# Patient Record
Sex: FEMALE | Race: WHITE | NOT HISPANIC OR LATINO | Employment: PART TIME | URBAN - METROPOLITAN AREA
[De-identification: names, ages, dates, MRNs, and addresses within clinical notes are randomized per-mention and may not be internally consistent; named-entity substitution may affect disease eponyms.]

---

## 2017-05-02 ENCOUNTER — GENERIC CONVERSION - ENCOUNTER (OUTPATIENT)
Dept: OTHER | Facility: OTHER | Age: 52
End: 2017-05-02

## 2017-05-16 ENCOUNTER — ALLSCRIPTS OFFICE VISIT (OUTPATIENT)
Dept: OTHER | Facility: OTHER | Age: 52
End: 2017-05-16

## 2017-05-16 PROCEDURE — 88305 TISSUE EXAM BY PATHOLOGIST: CPT | Performed by: OBSTETRICS & GYNECOLOGY

## 2017-05-17 ENCOUNTER — LAB REQUISITION (OUTPATIENT)
Dept: LAB | Facility: HOSPITAL | Age: 52
End: 2017-05-17
Payer: COMMERCIAL

## 2017-05-17 DIAGNOSIS — N95.0 POSTMENOPAUSAL BLEEDING: ICD-10-CM

## 2017-07-11 ENCOUNTER — GENERIC CONVERSION - ENCOUNTER (OUTPATIENT)
Dept: OTHER | Facility: OTHER | Age: 52
End: 2017-07-11

## 2017-07-27 ENCOUNTER — TRANSCRIBE ORDERS (OUTPATIENT)
Dept: ADMINISTRATIVE | Facility: HOSPITAL | Age: 52
End: 2017-07-27

## 2017-07-27 DIAGNOSIS — Z12.31 ENCOUNTER FOR SCREENING MAMMOGRAM FOR MALIGNANT NEOPLASM OF BREAST: Primary | ICD-10-CM

## 2017-07-27 DIAGNOSIS — E78.5 HYPERLIPIDEMIA: ICD-10-CM

## 2017-07-27 DIAGNOSIS — Z48.89 OTHER SPECIFIED AFTERCARE FOLLOWING SURGERY: ICD-10-CM

## 2017-07-27 DIAGNOSIS — Z78.0 ASYMPTOMATIC MENOPAUSAL STATE: ICD-10-CM

## 2017-07-27 DIAGNOSIS — Z78.0 POST-MENOPAUSAL: Primary | ICD-10-CM

## 2017-08-03 ENCOUNTER — APPOINTMENT (OUTPATIENT)
Dept: LAB | Facility: CLINIC | Age: 52
End: 2017-08-03
Payer: COMMERCIAL

## 2017-08-03 ENCOUNTER — TRANSCRIBE ORDERS (OUTPATIENT)
Dept: LAB | Facility: CLINIC | Age: 52
End: 2017-08-03

## 2017-08-03 DIAGNOSIS — Z00.8 HEALTH EXAMINATION IN POPULATION SURVEY: Primary | ICD-10-CM

## 2017-08-03 LAB
CHOLEST SERPL-MCNC: 303 MG/DL (ref 50–200)
EST. AVERAGE GLUCOSE BLD GHB EST-MCNC: 120 MG/DL
HBA1C MFR BLD: 5.8 % (ref 4.2–6.3)
HDLC SERPL-MCNC: 50 MG/DL (ref 40–60)
LDLC SERPL CALC-MCNC: 187 MG/DL (ref 0–100)
TRIGL SERPL-MCNC: 328 MG/DL

## 2017-08-03 PROCEDURE — 83036 HEMOGLOBIN GLYCOSYLATED A1C: CPT

## 2017-08-03 PROCEDURE — 36415 COLL VENOUS BLD VENIPUNCTURE: CPT

## 2017-08-03 PROCEDURE — 80061 LIPID PANEL: CPT

## 2017-08-07 ENCOUNTER — GENERIC CONVERSION - ENCOUNTER (OUTPATIENT)
Dept: OTHER | Facility: OTHER | Age: 52
End: 2017-08-07

## 2017-08-09 ENCOUNTER — GENERIC CONVERSION - ENCOUNTER (OUTPATIENT)
Dept: OTHER | Facility: OTHER | Age: 52
End: 2017-08-09

## 2017-08-14 ENCOUNTER — APPOINTMENT (OUTPATIENT)
Dept: LAB | Facility: CLINIC | Age: 52
End: 2017-08-14
Payer: COMMERCIAL

## 2017-08-14 DIAGNOSIS — E78.5 HYPERLIPIDEMIA: ICD-10-CM

## 2017-08-14 LAB
ALBUMIN SERPL BCP-MCNC: 4 G/DL (ref 3.5–5)
ALP SERPL-CCNC: 79 U/L (ref 46–116)
ALT SERPL W P-5'-P-CCNC: 21 U/L (ref 12–78)
ANION GAP SERPL CALCULATED.3IONS-SCNC: 7 MMOL/L (ref 4–13)
AST SERPL W P-5'-P-CCNC: 18 U/L (ref 5–45)
BILIRUB SERPL-MCNC: 0.41 MG/DL (ref 0.2–1)
BUN SERPL-MCNC: 19 MG/DL (ref 5–25)
CALCIUM SERPL-MCNC: 9.1 MG/DL (ref 8.3–10.1)
CHLORIDE SERPL-SCNC: 105 MMOL/L (ref 100–108)
CO2 SERPL-SCNC: 28 MMOL/L (ref 21–32)
CREAT SERPL-MCNC: 0.8 MG/DL (ref 0.6–1.3)
GFR SERPL CREATININE-BSD FRML MDRD: 86 ML/MIN/1.73SQ M
GLUCOSE P FAST SERPL-MCNC: 72 MG/DL (ref 65–99)
POTASSIUM SERPL-SCNC: 3.9 MMOL/L (ref 3.5–5.3)
PROT SERPL-MCNC: 7.5 G/DL (ref 6.4–8.2)
SODIUM SERPL-SCNC: 140 MMOL/L (ref 136–145)

## 2017-08-14 PROCEDURE — 36415 COLL VENOUS BLD VENIPUNCTURE: CPT

## 2017-08-14 PROCEDURE — 80053 COMPREHEN METABOLIC PANEL: CPT

## 2017-08-15 ENCOUNTER — GENERIC CONVERSION - ENCOUNTER (OUTPATIENT)
Dept: OTHER | Facility: OTHER | Age: 52
End: 2017-08-15

## 2017-08-22 ENCOUNTER — ALLSCRIPTS OFFICE VISIT (OUTPATIENT)
Dept: OTHER | Facility: OTHER | Age: 52
End: 2017-08-22

## 2017-08-22 ENCOUNTER — HOSPITAL ENCOUNTER (OUTPATIENT)
Dept: RADIOLOGY | Facility: HOSPITAL | Age: 52
Discharge: HOME | End: 2017-08-22
Attending: ORTHOPAEDIC SURGERY
Payer: COMMERCIAL

## 2017-08-22 DIAGNOSIS — Z48.89 OTHER SPECIFIED AFTERCARE FOLLOWING SURGERY: ICD-10-CM

## 2017-08-22 PROCEDURE — 73502 X-RAY EXAM HIP UNI 2-3 VIEWS: CPT

## 2017-09-08 ENCOUNTER — LAB REQUISITION (OUTPATIENT)
Dept: LAB | Facility: HOSPITAL | Age: 52
End: 2017-09-08
Payer: COMMERCIAL

## 2017-09-08 ENCOUNTER — ALLSCRIPTS OFFICE VISIT (OUTPATIENT)
Dept: OTHER | Facility: OTHER | Age: 52
End: 2017-09-08

## 2017-09-08 DIAGNOSIS — N84.0 POLYP OF CORPUS UTERI: ICD-10-CM

## 2017-09-08 DIAGNOSIS — N95.0 POSTMENOPAUSAL BLEEDING: ICD-10-CM

## 2017-09-08 PROCEDURE — 88305 TISSUE EXAM BY PATHOLOGIST: CPT | Performed by: OBSTETRICS & GYNECOLOGY

## 2017-09-20 ENCOUNTER — GENERIC CONVERSION - ENCOUNTER (OUTPATIENT)
Dept: OTHER | Facility: OTHER | Age: 52
End: 2017-09-20

## 2017-10-06 ENCOUNTER — HOSPITAL ENCOUNTER (OUTPATIENT)
Dept: RADIOLOGY | Age: 52
Discharge: HOME | End: 2017-10-06
Payer: COMMERCIAL

## 2017-10-06 DIAGNOSIS — Z78.0 POST-MENOPAUSAL: ICD-10-CM

## 2017-10-06 DIAGNOSIS — Z12.31 ENCOUNTER FOR SCREENING MAMMOGRAM FOR MALIGNANT NEOPLASM OF BREAST: ICD-10-CM

## 2017-10-06 PROCEDURE — G0202 SCR MAMMO BI INCL CAD: HCPCS

## 2017-10-06 PROCEDURE — 77080 DXA BONE DENSITY AXIAL: CPT

## 2017-10-06 PROCEDURE — 77063 BREAST TOMOSYNTHESIS BI: CPT

## 2017-10-09 ENCOUNTER — GENERIC CONVERSION - ENCOUNTER (OUTPATIENT)
Dept: OTHER | Facility: OTHER | Age: 52
End: 2017-10-09

## 2017-11-15 DIAGNOSIS — E78.5 HYPERLIPIDEMIA: ICD-10-CM

## 2017-12-11 ENCOUNTER — GENERIC CONVERSION - ENCOUNTER (OUTPATIENT)
Dept: OTHER | Facility: OTHER | Age: 52
End: 2017-12-11

## 2017-12-11 ENCOUNTER — TRANSCRIBE ORDERS (OUTPATIENT)
Dept: ADMINISTRATIVE | Facility: HOSPITAL | Age: 52
End: 2017-12-11

## 2017-12-11 DIAGNOSIS — I71.2 THORACIC AORTIC ANEURYSM WITHOUT RUPTURE (HCC): Primary | ICD-10-CM

## 2017-12-12 ENCOUNTER — HOSPITAL ENCOUNTER (OUTPATIENT)
Dept: RADIOLOGY | Facility: HOSPITAL | Age: 52
Discharge: HOME | End: 2017-12-12
Payer: COMMERCIAL

## 2017-12-12 ENCOUNTER — GENERIC CONVERSION - ENCOUNTER (OUTPATIENT)
Dept: OTHER | Facility: OTHER | Age: 52
End: 2017-12-12

## 2017-12-12 DIAGNOSIS — I71.2 THORACIC AORTIC ANEURYSM WITHOUT RUPTURE (HCC): ICD-10-CM

## 2017-12-12 PROCEDURE — 71260 CT THORAX DX C+: CPT

## 2017-12-12 RX ADMIN — IODIXANOL 85 ML: 320 INJECTION, SOLUTION INTRAVASCULAR at 19:28

## 2018-01-10 NOTE — RESULT NOTES
Verified Results  (1) COMPREHENSIVE METABOLIC PANEL [id number] 66:18CX Sherry BOWER Order Number: [id number]     Test Name Result Flag Reference   SODIUM 140 mmol/L  136-145   POTASSIUM 3 9 mmol/L  3 5-5 3   CHLORIDE 105 mmol/L  100-108   CARBON DIOXIDE 28 mmol/L  21-32   ANION GAP (CALC) 7 mmol/L  4-13   BLOOD UREA NITROGEN 19 mg/dL  5-25   CREATININE 0 80 mg/dL  0 60-1 30   Standardized to IDMS reference method   CALCIUM 9 1 mg/dL  8 3-10 1   BILI, TOTAL 0 41 mg/dL  0 20-1 00   ALK PHOSPHATAS 79 U/L     ALT (SGPT) 21 U/L  12-78   Specimen collection should occur prior to Sulfasalazine and/or Sulfapyridine administration due to the potential for falsely depressed results  AST(SGOT) 18 U/L  5-45   Specimen collection should occur prior to Sulfasalazine administration due to the potential for falsely depressed results  ALBUMIN 4 0 g/dL  3 5-5 0   TOTAL PROTEIN 7 5 g/dL  6 4-8 2   eGFR 86 ml/min/1 73sq m     National Kidney Disease Education Program recommendations are as follows:  GFR calculation is accurate only with a steady state creatinine  Chronic Kidney disease less than 60 ml/min/1 73 sq  meters  Kidney failure less than 15 ml/min/1 73 sq  meters  GLUCOSE FASTING 72 mg/dL  65-99   Specimen collection should occur prior to Sulfasalazine administration due to the potential for falsely depressed results  Specimen collection should occur prior to Sulfapyridine administration due to the potential for falsely elevated results

## 2018-01-12 VITALS
SYSTOLIC BLOOD PRESSURE: 124 MMHG | HEIGHT: 65 IN | BODY MASS INDEX: 24.99 KG/M2 | WEIGHT: 150 LBS | DIASTOLIC BLOOD PRESSURE: 82 MMHG

## 2018-01-12 NOTE — MISCELLANEOUS
Message  To whom it May concern:  Patient is at low risk for tuberculosis  Signatures   Electronically signed by :  LATISHA Saab ; Sep 20 2017  4:54PM EST                       (Author)

## 2018-01-13 VITALS
HEART RATE: 50 BPM | BODY MASS INDEX: 24.99 KG/M2 | WEIGHT: 150 LBS | HEIGHT: 65 IN | DIASTOLIC BLOOD PRESSURE: 62 MMHG | SYSTOLIC BLOOD PRESSURE: 118 MMHG

## 2018-01-14 NOTE — MISCELLANEOUS
Message  Pt called she had no period for 1 year then had a couple of hours of spotting then stopped Spoke to Dr Phyllis Keene he wants her to have work up appt scheduled for 5/16 TVS/SIS      Active Problems    1  Acute pharyngitis (462) (J02 9)   2  Aftercare following left hip joint replacement surgery (V54 81,V43 64) (Z47 1,Z96 642)   3  Allergic reaction to dye (995 27,E947 4) (T50 995A)   4  Anxiety (300 00) (F41 9)   5  Benign familial tremor (333 1) (G25 0)   6  Cervical radiculopathy (723 4) (M54 12)   7  Chronic hip pain, left (719 45,338 29) (M25 552,G89 29)   8  Chronic lower back pain (724 2,338 29) (M54 5,G89 29)   9  Chronic otitis media of right ear (382 9) (H66 91)   10  Cough (786 2) (R05)   11  Dense breasts (793 82) (R92 2)   12  Disturbance Of Smell (781 1)   13  DJD (degenerative joint disease), cervical (722 4) (M50 30)   14  Encounter for routine gynecological examination with Papanicolaou smear of cervix    (V72 31,V76 2) (Z01 419)   15  Encounter for screening mammogram for malignant neoplasm of breast (V76 12)    (Z12 31)   16  Endometriosis (617 9) (N80 9)   17  Episodic tension-type headache, not intractable (339 11) (G44 219)   18  Esophageal reflux (530 81) (K21 9)   19  Hemangioma of skin (228 01) (D18 01)   20  Herniated cervical disc (722 0) (M50 20)   21  Herpes simplex infection (054 9) (B00 9)   22  History of headache (V13 89) (Z87 898)   23  Hyperlipidemia (272 4) (E78 5)   24  Lateral epicondylitis of left elbow (726 32) (M77 12)   25  Long term use of drug (V58 69) (Z79 899)   26  Lumbar degenerative disc disease (722 52) (M51 36)   27  Menopause (627 2) (Z78 0)   28  Myofascial pain syndrome (729 1) (M79 1)   29  Need for prophylactic vaccination and inoculation against influenza (V04 81) (Z23)   30  BRENDA (obstructive sleep apnea) (327 23) (G47 33)   31  Pelvic and perineal pain (625 9) (R10 2)   32  Pelvic congestion syndrome (625 5) (N94 89)   33   PPD screening test (V74 1) (Z11 1)   34  Primary localized osteoarthrosis of left hip (715 15) (M16 12)   35  Primary localized osteoarthrosis of the hip, right (715 15) (M16 11)   36  Prophylactic antibiotic (V58 62) (Z79 2)   37  Sleep apnea (780 57) (G47 30)   38  Taste Disturbances (781 1)   39  Tear of right rotator cuff (840 4) (M75 101)    Current Meds   1  ALPRAZolam 0 25 MG Oral Tablet; TAKE 1 TABLET DAILY AS NEEDED; Therapy: 57EYD2603 to (Last Rx:75Xts9819) Ordered   2  Amoxicillin 500 MG Oral Capsule; TAKE 1 CAPSULE 3 TIMES DAILY UNTIL GONE;   Therapy: 85GDO0227 to (Last Rx:16Mar2017)  Requested for: 75XXT0355 Ordered   3  Caltrate 600+D TABS; Take 1 tablet daily; Therapy: (66 411 64 22) to Recorded   4  Clindamycin Phosphate 1 % External Solution; APPLY SPARINGLY TO AFFECTED   AREA(S) TWICE DAILY; Therapy: 56CRJ6663 to (Evaluate:69Bnk7387)  Requested for: 89Qgd8102; Last   Rx:35Qxs8454 Ordered   5  Coenzyme Q10 100 MG Oral Tablet; Therapy: 80FGJ2188 to Recorded   6  [employer] Vitamin E 400 UNIT Oral Capsule; Take as directed Recorded   7  Daily Value Multivitamin Oral Tablet; Take 1 tablet daily Recorded   8  Diclofenac Sodium 75 MG Oral Tablet Delayed Release; TAKE 1 TABLET Every twelve   hours; Therapy: 03VSQ4280 to (Evaluate:17Nov2014)  Requested for: 09OWV3979; Last   Rx:41Ubw2087 Ordered   9  Dicyclomine HCl - 20 MG Oral Tablet; TAKE 1 TABLET EVERY 6 HOURS AS NEEDED; Therapy: 87VWD8169 to (Evaluate:14Nov2015)  Requested for: 79OLH2787; Last   Rx:40Lij7418 Ordered   10  DULoxetine HCl - 30 MG Oral Capsule Delayed Release Particles (Cymbalta); TAKE 1    CAPSULE Daily; Therapy: 87Dgl1836 to (Evaluate:22Mar2018)  Requested for: 45YPF1600; Last    Rx:27Mar2017 Ordered   11  Gabapentin 100 MG Oral Capsule; take 1 capsule by mouth every 8 hours; Therapy: 26CAL0403 to (Evaluate:89Gnj3892)  Requested for: 61MZQ0874; Last    Rx:20Fvr0242 Ordered   12  Methocarbamol 750 MG Oral Tablet;  Take 1 tablet twice daily as needed; Therapy: 84QQU1954 to (Evaluate:39Cgy7599)  Requested for: 89FDY3083; Last    Rx:21May2014 Ordered   13  Omeprazole 20 MG Oral Capsule Delayed Release; Take 2 tablets QD  Requested for:    22QUA3290; Last Rx:31Jan2017 Ordered   14  Orphenadrine Citrate  MG Oral Tablet Extended Release 12 Hour; Take one    tablet daily at bedtime when necessary; Therapy: 55HZD5800 to (Evaluate:17Gzg7669)  Requested for: 30GED5380; Last    Rx:02Mar2017 Ordered   15  Red Yeast Rice 600 MG Oral Capsule; Therapy: 21LBY8840 to Recorded   16  TraMADol HCl - 50 MG Oral Tablet; TAKE 1 TABLET EVERY 6 HOURS AS NEEDED FOR    PAIN;    Therapy: 20RPC6321 to (Evaluate:05Mar2017); Last MI:53PXF7582 Ordered   17  ValACYclovir HCl - 1 GM Oral Tablet; TAKE 2 TABLETS TWICE DAILY FOR 1 DAY AT    FIRST SIGN OF ONSET; Therapy: 14JVF6691 to (Evaluate:21Apr2016)  Requested for: 12Apr2016; Last    Rx:12Apr2016 Ordered   18  Zinc TABS; Therapy: (Recorded:39Swx0663) to Recorded    Allergies    1  Gentamicin in Saline SOLN   2  Morphine Sulfate in Dextrose SOLN   3  Vicodin TABS    4   IVP Dye    Signatures   Electronically signed by : Angel Gage, ; May  2 2017  3:34PM EST                       (Author)

## 2018-01-15 NOTE — RESULT NOTES
Verified Results  * DXA BONE DENSITY SPINE HIP AND PELVIS 78HUH4095 10:22AM Jazzy Began     Test Name Result Flag Reference   DXA BONE DENSITY SPINE HIP AND PELVIS (Report)     CENTRAL DXA SCAN     CLINICAL HISTORY:  49year old post-menopausal  female with history of bilateral hip replacement  The patient walks and takes calcium and vitamin D supplements  TECHNIQUE: Bone densitometry was performed using a Hologic Horizon A bone densitometer  Regions of interest appear properly placed  There are no obvious fractures or other confounding variables which could limit the study  COMPARISON: February 7, 2014 and before     RESULTS:    LUMBAR SPINE: L1-L4:   BMD 0 974 gm/cm2   T-score -0 7   Z-score 0 1     LEFT FOREARM-ONE 3RD REGION:   BMD 0 781 gm/cm2   T-score 1 7   Z-score 2 5             IMPRESSION:   1  Based on the [facility] classification, this study is normal and the patient is considered at low risk for fracture  2  Since the prior study, there has been a significant decrease in BMD in the lumbar spine of 0 039 Gm/cm2 or 3 8%  This change exceeds our own least significant change and, therefore, is statistically significant within 95% confidence level  3  A daily intake of calcium of at least 1200 mg and vitamin D, 800-1000 IU, as well as weight bearing and muscle strengthening exercise, fall prevention and avoidance of tobacco and excessive alcohol intake  4  Repeat DXA in 18 - 24 months, on the same machine, as clinically indicated         WHO CLASSIFICATION:   Normal (a T-score of -1 0 or higher)   Low bone mineral density (a T-score of less than -1 0 but higher than -2 5)   Osteoporosis (a T-score of -2 5 or less)   Severe osteoporosis (a T-score of -2 5 or less with a fragility fracture)             Workstation performed: [id number]     Signed by:   Kusum Traylor MD   10/6/17

## 2018-01-15 NOTE — MISCELLANEOUS
Message  Pharmacy called brisdelle not covered but will give the generic Paxil [facility] CALLED      Active Problems    1  Acute pharyngitis (462) (J02 9)   2  Aftercare following left hip joint replacement surgery (V54 81,V43 64) (Z47 1,Z96 642)   3  Allergic reaction to dye (995 27,E947 4) (T50 995A)   4  Anxiety (300 00) (F41 9)   5  Benign familial tremor (333 1) (G25 0)   6  Cervical radiculopathy (723 4) (M54 12)   7  Chronic lower back pain (724 2,338 29) (M54 5,G89 29)   8  Chronic otitis media of right ear (382 9) (H66 91)   9  Dense breasts (793 82) (R92 2)   10  Disturbance Of Smell (781 1)   11  DJD (degenerative joint disease), cervical (722 4) (M50 30)   12  Encounter for routine gynecological examination with Papanicolaou smear of cervix    (V72 31,V76 2) (Z01 419)   13  Encounter for screening mammogram for malignant neoplasm of breast (V76 12)    (Z12 31)   14  Endometriosis (617 9) (N80 9)   15  Esophageal reflux (530 81) (K21 9)   16  Hemangioma of skin (228 01) (D18 01)   17  Herniated cervical disc (722 0) (M50 20)   18  Herpes simplex infection (054 9) (B00 9)   19  History of headache (V13 89) (Z87 898)   20  Hyperlipidemia (272 4) (E78 5)   21  Lateral epicondylitis of left elbow (726 32) (M77 12)   22  Long term use of drug (V58 69) (Z79 899)   23  Lumbar degenerative disc disease (722 52) (M51 36)   24  Menopause (627 2) (Z78 0)   25  Myofascial pain syndrome (729 1) (M79 1)   26  Need for prophylactic vaccination and inoculation against influenza (V04 81) (Z23)   27  Pelvic and perineal pain (625 9) (R10 2)   28  Pelvic congestion syndrome (625 5) (N94 89)   29  PPD screening test (V74 1) (Z11 1)   30  Primary localized osteoarthrosis of left hip (715 15) (M16 12)   31  Primary localized osteoarthrosis of the hip, right (715 15) (M16 11)   32  Prophylactic antibiotic (V58 62) (Z79 2)   33  Sleep apnea (780 57) (G47 30)   34  Taste Disturbances (781 1)   35   Tear of rotator cuff, right    Current Meds   1  ALPRAZolam 0 25 MG Oral Tablet; TAKE 1 TABLET DAILY AS NEEDED; Therapy: 33JZY6551 to (Last Rx:48Qzd6855) Ordered   2  Amoxicillin 500 MG Oral Capsule; TAKE 1 CAPSULE 3 TIMES DAILY UNTIL GONE;   Therapy: 03JKG7434 to (Last Rx:22Jun2016)  Requested for: 06BTK6408 Ordered   3  BL Vitamin C 500 MG TABS; Take 1 tablet daily Recorded   4  Caltrate 600+D TABS; Take 1 tablet daily; Therapy: (Wannetta Severs) to Recorded   5  Clindamycin Phosphate 1 % External Solution; APPLY SPARINGLY TO AFFECTED   AREA(S) TWICE DAILY; Therapy: 07KVV0042 to (Evaluate:17Mut8789)  Requested for: 37Oss6309; Last   Rx:69Izk6368 Ordered   6  [employer]S Vitamin E 400 UNIT Oral Capsule; Take as directed Recorded   7  Daily Value Multivitamin Oral Tablet; Take 1 tablet daily Recorded   8  Diclofenac Sodium 75 MG Oral Tablet Delayed Release; TAKE 1 TABLET Every twelve   hours; Therapy: 17EGW9855 to (Evaluate:17Nov2014)  Requested for: 84QSL2630; Last   Rx:21May2014 Ordered   9  Dicyclomine HCl - 20 MG Oral Tablet; TAKE 1 TABLET EVERY 6 HOURS AS NEEDED; Therapy: 34MDH9644 to (Evaluate:14Nov2015)  Requested for: 33ZSR9001; Last   Rx:87Fcy3483 Ordered   10  Gabapentin 100 MG Oral Capsule; take 1 capsule by mouth every 8 hours; Therapy: 25NDM5077 to (Evaluate:91Hfk7030)  Requested for: 61GHE2433; Last    Rx:88Dif7209 Ordered   11  Glucosamine Chondroitin Complx TABS; Take 1 tablet daily as directed Recorded   12  Methocarbamol 750 MG Oral Tablet; Take 1 tablet twice daily as needed; Therapy: 89VXQ7982 to (Evaluate:16Gmq0528)  Requested for: 18RJZ7142; Last    Rx:51Zso9995 Ordered   13  Omeprazole 20 MG Oral Capsule Delayed Release; Take 2 tablets QD  Requested for:    30Apr2016; Last Rx:19Apr2016 Ordered   14  Orphenadrine Citrate  MG Oral Tablet Extended Release 12 Hour; Take one    tablet daily at bedtime when necessary; Therapy: 10DXU6401 to (Last Rx:22Jan2014)  Requested for: 40CTN3251 Ordered   15  TiZANidine HCl - 4 MG Oral Tablet; TAKE 1 TABLET AT BEDTIME; Therapy: 47OGF9989 to (Evaluate:38Dtr4451)  Requested for: 03MAK7120; Last    Rx:21May2014 Ordered   16  ValACYclovir HCl - 1 GM Oral Tablet; TAKE 2 TABLETS TWICE DAILY FOR 1 DAY AT    FIRST SIGN OF ONSET; Therapy: 60VIG8802 to (Evaluate:21Apr2016)  Requested for: 12Apr2016; Last    Rx:12Apr2016 Ordered   17  Zinc TABS; Therapy: (Recorded:22Rsa7166) to Recorded    Allergies    1  Gentamicin in Saline SOLN   2  Morphine Sulfate in Dextrose SOLN   3  Vicodin TABS    4  IVP Dye    Plan  Menopause    · Brisdelle 7 5 MG Oral Capsule;  Take one caplet every evening before bed    Signatures   Electronically signed by : Jeffery Hale, ; Jul 18 2016 11:47AM EST                       (Author)

## 2018-01-17 NOTE — MISCELLANEOUS
Message  Patient called the office stating that she has a significant amount of headaches  She thought that they may be migraine headaches  She has had them in the past  At this point, she is not experiencing any of the aura  The headache is slow to start, and bothers her quite a bit after a while  It appears to be all over the head, not unilateral   This headache sounds more like it is tension headaches  Imitrex would not work on this  Based on that, I would recommend that she continue on the Voltaren that she currently has (diclofenac)  She is trying to avoid caffeine at [facility], so she is not using Excedrin at bedtime  I would also agree, especially as this will also increase her reflux symptoms a bit potentially  I did caution her with regard to the diclofenac and increasing reflux symptoms  Of note, with the diclofenac, she was not using it every day  Signatures   Electronically signed by :  LATISHA Sevilla ; Jul 11 2017  4:35PM EST                       (Author)

## 2018-01-17 NOTE — MISCELLANEOUS
Message  Pt called she is experiencing severe hot flashes has tried black cohash and Primrose oil with no relief Spoke to Dr Imani Sorenson he suggested Diaz Nickerson will try #20 pills and see if it works for her Rx called      Active Problems    1  Acute pharyngitis (462) (J02 9)   2  Aftercare following left hip joint replacement surgery (V54 81,V43 64) (Z47 1,Z96 642)   3  Allergic reaction to dye (995 27,E947 4) (T50 995A)   4  Anxiety (300 00) (F41 9)   5  Benign familial tremor (333 1) (G25 0)   6  Cervical radiculopathy (723 4) (M54 12)   7  Chronic lower back pain (724 2,338 29) (M54 5,G89 29)   8  Chronic otitis media of right ear (382 9) (H66 91)   9  Dense breasts (793 82) (R92 2)   10  Disturbance Of Smell (781 1)   11  DJD (degenerative joint disease), cervical (722 4) (M50 30)   12  Encounter for routine gynecological examination with Papanicolaou smear of cervix    (V72 31,V76 2) (Z01 419)   13  Encounter for screening mammogram for malignant neoplasm of breast (V76 12)    (Z12 31)   14  Endometriosis (617 9) (N80 9)   15  Esophageal reflux (530 81) (K21 9)   16  Hemangioma of skin (228 01) (D18 01)   17  Herniated cervical disc (722 0) (M50 20)   18  Herpes simplex infection (054 9) (B00 9)   19  History of headache (V13 89) (Z87 898)   20  Hyperlipidemia (272 4) (E78 5)   21  Lateral epicondylitis of left elbow (726 32) (M77 12)   22  Long term use of drug (V58 69) (Z79 899)   23  Lumbar degenerative disc disease (722 52) (M51 36)   24  Menopause (627 2) (Z78 0)   25  Myofascial pain syndrome (729 1) (M79 1)   26  Need for prophylactic vaccination and inoculation against influenza (V04 81) (Z23)   27  Pelvic and perineal pain (625 9) (R10 2)   28  Pelvic congestion syndrome (625 5) (N94 89)   29  PPD screening test (V74 1) (Z11 1)   30  Primary localized osteoarthrosis of left hip (715 15) (M16 12)   31  Primary localized osteoarthrosis of the hip, right (715 15) (M16 11)   32   Prophylactic antibiotic (V58 62) (Z79 2)   33  Sleep apnea (780 57) (G47 30)   34  Taste Disturbances (781 1)   35  Tear of rotator cuff, right    Current Meds   1  ALPRAZolam 0 25 MG Oral Tablet; TAKE 1 TABLET DAILY AS NEEDED; Therapy: 45NIM8540 to (Last Rx:74Plr7969) Ordered   2  Amoxicillin 500 MG Oral Capsule; TAKE 1 CAPSULE 3 TIMES DAILY UNTIL GONE;   Therapy: 87JAK4854 to (Last Rx:22Jun2016)  Requested for: 63FGR8840 Ordered   3  BL Vitamin C 500 MG TABS; Take 1 tablet daily Recorded   4  Caltrate 600+D TABS; Take 1 tablet daily; Therapy: (66 411 64 22) to Recorded   5  Clindamycin Phosphate 1 % External Solution; APPLY SPARINGLY TO AFFECTED   AREA(S) TWICE DAILY; Therapy: 44YVX4590 to (Evaluate:53Xir4616)  Requested for: 74Xbu6375; Last   Rx:13Azs0923 Ordered   6  [employer]S Vitamin E 400 UNIT Oral Capsule; Take as directed Recorded   7  Daily Value Multivitamin Oral Tablet; Take 1 tablet daily Recorded   8  Diclofenac Sodium 75 MG Oral Tablet Delayed Release; TAKE 1 TABLET Every twelve   hours; Therapy: 17HXJ3267 to (Evaluate:17Nov2014)  Requested for: 30BEU4113; Last   Rx:27Phc4692 Ordered   9  Dicyclomine HCl - 20 MG Oral Tablet; TAKE 1 TABLET EVERY 6 HOURS AS NEEDED; Therapy: 35VXK9599 to (Evaluate:14Nov2015)  Requested for: 22UDR9469; Last   Rx:49Jvp4039 Ordered   10  Gabapentin 100 MG Oral Capsule; take 1 capsule by mouth every 8 hours; Therapy: 93QDZ8159 to (Evaluate:26Jen9409)  Requested for: 27SNK2620; Last    Rx:53Mpl6602 Ordered   11  Glucosamine Chondroitin Complx TABS; Take 1 tablet daily as directed Recorded   12  Methocarbamol 750 MG Oral Tablet; Take 1 tablet twice daily as needed; Therapy: 24FVQ9885 to (Evaluate:36Dpw0922)  Requested for: 68MSV5242; Last    Rx:35Kjz6862 Ordered   13  Omeprazole 20 MG Oral Capsule Delayed Release; Take 2 tablets QD  Requested for:    19Jck4490; Last Rx:26Ojv2959 Ordered   14  Orphenadrine Citrate  MG Oral Tablet Extended Release 12 Hour;  Take one    tablet daily at bedtime when necessary; Therapy: 63ZYE1975 to (Last Rx:22Jan2014)  Requested for: 44EKV0169 Ordered   15  TiZANidine HCl - 4 MG Oral Tablet; TAKE 1 TABLET AT BEDTIME; Therapy: 69YAE4140 to (Evaluate:28Flz1472)  Requested for: 55KSE3977; Last    Rx:21May2014 Ordered   16  ValACYclovir HCl - 1 GM Oral Tablet; TAKE 2 TABLETS TWICE DAILY FOR 1 DAY AT    FIRST SIGN OF ONSET; Therapy: 36PNR9172 to (Evaluate:21Apr2016)  Requested for: 12Apr2016; Last    Rx:12Apr2016 Ordered   17  Zinc TABS; Therapy: (Recorded:29Xtr4600) to Recorded    Allergies    1  Gentamicin in Saline SOLN   2  Morphine Sulfate in Dextrose SOLN   3  Vicodin TABS    4  IVP Dye    Plan  Menopause    · Brisdelle 7 5 MG Oral Capsule;  Take one caplet every evening before bed    Signatures   Electronically signed by : Alley Roblero, ; Jul 14 2016 10:02AM EST                       (Author)

## 2018-01-23 NOTE — MISCELLANEOUS
Message  Patient has been having some shortness of breath, chest pain, pain radiating through the chest into the back  Has been going on for last several weeks  Seems to be somewhat worse overall  At this point, I am concerned that this might be a thoracic aneurysm, and a CT scan should be done  Plan  Aneurysm of thoracic aorta    · CT CHEST W CONTRAST; Status:Need Information - Financial Authorization; Requested  for:01Zdd1820;     Signatures   Electronically signed by :  LATISHA Mari ; Dec 11 2017  1:00PM EST                       (Author)

## 2018-02-28 DIAGNOSIS — M15.9 PRIMARY OSTEOARTHRITIS INVOLVING MULTIPLE JOINTS: Primary | ICD-10-CM

## 2018-02-28 RX ORDER — ORPHENADRINE CITRATE 100 MG/1
100 TABLET, EXTENDED RELEASE ORAL DAILY PRN
Qty: 30 TABLET | Refills: 5 | Status: SHIPPED | OUTPATIENT
Start: 2018-02-28 | End: 2018-08-29 | Stop reason: SDUPTHER

## 2018-03-05 ENCOUNTER — OFFICE VISIT (OUTPATIENT)
Dept: FAMILY MEDICINE CLINIC | Facility: CLINIC | Age: 53
End: 2018-03-05
Payer: COMMERCIAL

## 2018-03-05 VITALS
BODY MASS INDEX: 26.16 KG/M2 | TEMPERATURE: 98 F | SYSTOLIC BLOOD PRESSURE: 110 MMHG | DIASTOLIC BLOOD PRESSURE: 74 MMHG | WEIGHT: 157 LBS | HEIGHT: 65 IN | HEART RATE: 70 BPM

## 2018-03-05 DIAGNOSIS — J01.90 ACUTE NON-RECURRENT SINUSITIS, UNSPECIFIED LOCATION: Primary | ICD-10-CM

## 2018-03-05 PROBLEM — I71.2 ANEURYSM OF THORACIC AORTA (HCC): Status: ACTIVE | Noted: 2017-12-11

## 2018-03-05 PROBLEM — I71.20 ANEURYSM OF THORACIC AORTA: Status: ACTIVE | Noted: 2017-12-11

## 2018-03-05 PROBLEM — N95.0 POSTMENOPAUSAL BLEEDING: Status: ACTIVE | Noted: 2017-05-16

## 2018-03-05 PROBLEM — N84.0 ENDOMETRIAL POLYP: Status: ACTIVE | Noted: 2017-05-16

## 2018-03-05 PROCEDURE — 99213 OFFICE O/P EST LOW 20 MIN: CPT | Performed by: FAMILY MEDICINE

## 2018-03-05 RX ORDER — PRAVASTATIN SODIUM 20 MG
1 TABLET ORAL DAILY
COMMUNITY
Start: 2017-08-15 | End: 2018-08-09 | Stop reason: SDUPTHER

## 2018-03-05 RX ORDER — AZITHROMYCIN 500 MG/1
500 TABLET, FILM COATED ORAL DAILY
Qty: 3 TABLET | Refills: 0 | Status: SHIPPED | OUTPATIENT
Start: 2018-03-05 | End: 2018-03-08

## 2018-03-05 RX ORDER — METHOCARBAMOL 750 MG/1
1 TABLET, FILM COATED ORAL 2 TIMES DAILY PRN
COMMUNITY
Start: 2014-05-21 | End: 2019-12-03 | Stop reason: SDUPTHER

## 2018-03-05 NOTE — PROGRESS NOTES
Assessment/Plan:    Acute non-recurrent sinusitis  Patient was placed on Zithromax 500 mg 1 daily #3  Monia Le She is to continue Mucinex D 1 twice a day and Delsym 2 tsp twice a day for her cough  She was given Arnuity 100 mcg 1 puff daily  Diagnoses and all orders for this visit:    Acute non-recurrent sinusitis, unspecified location    Other orders  -     methocarbamol (ROBAXIN) 750 mg tablet; Take 1 tablet by mouth 2 (two) times a day as needed  -     pravastatin (PRAVACHOL) 20 mg tablet; Take 1 tablet by mouth daily          Subjective:      Patient ID: Adiel Gonzalez is a 47 y o  female  CC:  Congestion, sore throat, fatigue that started Thursday  Productive cough  Thick green nasal discharge  Currently using Benadryl, Robitussin, Mucinex D, Afrin nasal spray  - [facility]    HPI:    This is a 67-year-old female who comes in with symptoms of head congestion and now congestion in her chest   Symptoms began approximately 5 days ago and have gotten worse  She initially had a sore throat but the throat is improved now  She has fatigue and a productive cough which keeps her awake at night  She has postnasal drip but no nausea, vomiting or diarrhea  She has been using Benadryl, Robitussin, and Mucinex D with no relief  The following portions of the patient's history were reviewed and updated as appropriate: allergies, current medications, past family history, past medical history, past social history, past surgical history and problem list     Review of Systems   Constitutional: Positive for fatigue  Negative for chills and fever  HENT: Positive for congestion, postnasal drip, rhinorrhea, sinus pressure and sore throat  Eyes: Negative  Respiratory: Positive for cough  Negative for shortness of breath and wheezing  Cardiovascular: Negative  Gastrointestinal: Negative  Endocrine: Negative  Genitourinary: Negative  Musculoskeletal: Negative  Skin: Negative      Allergic/Immunologic: Negative  Neurological: Negative  Hematological: Negative  Psychiatric/Behavioral: Negative  Vitals:    03/05/18 1428   BP: 110/74   BP Location: Left arm   Patient Position: Sitting   Cuff Size: Standard   Pulse: 70   Temp: 98 °F (36 7 °C)   TempSrc: Oral   Weight: 71 2 kg (157 lb)   Height: 5' 4 5" (1 638 m)   Objective:      /74 (BP Location: Left arm, Patient Position: Sitting, Cuff Size: Standard)   Pulse 70   Temp 98 °F (36 7 °C) (Oral)   Ht 5' 4 5" (1 638 m)   Wt 71 2 kg (157 lb)   BMI 26 53 kg/m²          Physical Exam   Constitutional: She is oriented to person, place, and time  She appears well-developed and well-nourished  HENT:   Head: Normocephalic  Right Ear: External ear normal    Mouth/Throat: No oropharyngeal exudate  Left tympanic membrane dull with no injection or erythema  Positive postnasal drip, +1 erythema of posterior pharynx without exudates   Eyes: Conjunctivae and EOM are normal  Pupils are equal, round, and reactive to light  Neck: Normal range of motion  Neck supple  Cardiovascular: Normal rate, regular rhythm and normal heart sounds  Pulmonary/Chest: Effort normal and breath sounds normal  She has no wheezes  She has no rales  Abdominal: Soft  Bowel sounds are normal    Musculoskeletal: Normal range of motion  Lymphadenopathy:     She has cervical adenopathy  Neurological: She is alert and oriented to person, place, and time  Skin: Skin is warm  Psychiatric: She has a normal mood and affect  Her behavior is normal  Judgment and thought content normal    Nursing note and vitals reviewed

## 2018-03-05 NOTE — ASSESSMENT & PLAN NOTE
Patient was placed on Zithromax 500 mg 1 daily #3  [place] She is to continue Mucinex D 1 twice a day and Delsym 2 tsp twice a day for her cough  She was given Arnuity 100 mcg 1 puff daily

## 2018-03-26 DIAGNOSIS — K58.9 IRRITABLE BOWEL SYNDROME, UNSPECIFIED TYPE: Primary | ICD-10-CM

## 2018-03-26 RX ORDER — DICYCLOMINE HCL 20 MG
20 TABLET ORAL EVERY 6 HOURS
Qty: 60 TABLET | Refills: 2 | Status: SHIPPED | OUTPATIENT
Start: 2018-03-26 | End: 2019-04-17 | Stop reason: SDUPTHER

## 2018-05-10 DIAGNOSIS — G56.03 BILATERAL CARPAL TUNNEL SYNDROME: Primary | ICD-10-CM

## 2018-05-10 NOTE — PROGRESS NOTES
Problem List Items Addressed This Visit     Bilateral carpal tunnel syndrome - Primary     Tinelle, Phalen positive bilateral, but Finkelstein negative  Likely some CTS  Cock up splints at HS             Relevant Orders    Cock Up Wrist Splint

## 2018-05-10 NOTE — ASSESSMENT & PLAN NOTE
Tinelle, Phalen positive bilateral, but Finkelstein negative  Likely some CTS  Cock up splints at HS

## 2018-07-11 DIAGNOSIS — K21.9 GASTROESOPHAGEAL REFLUX DISEASE WITHOUT ESOPHAGITIS: Primary | Chronic | ICD-10-CM

## 2018-07-11 DIAGNOSIS — R53.82 CHRONIC FATIGUE: ICD-10-CM

## 2018-07-11 DIAGNOSIS — N80.9 ENDOMETRIOSIS: Primary | ICD-10-CM

## 2018-07-11 DIAGNOSIS — F41.9 ANXIETY: Primary | Chronic | ICD-10-CM

## 2018-07-11 DIAGNOSIS — G44.219 EPISODIC TENSION-TYPE HEADACHE, NOT INTRACTABLE: ICD-10-CM

## 2018-07-11 DIAGNOSIS — G47.30 SLEEP APNEA, UNSPECIFIED TYPE: ICD-10-CM

## 2018-07-11 DIAGNOSIS — Z78.0 MENOPAUSE: ICD-10-CM

## 2018-07-11 PROBLEM — I71.20 ANEURYSM OF THORACIC AORTA: Status: RESOLVED | Noted: 2017-12-11 | Resolved: 2018-07-11

## 2018-07-11 PROBLEM — I71.2 ANEURYSM OF THORACIC AORTA: Status: RESOLVED | Noted: 2017-12-11 | Resolved: 2018-07-11

## 2018-07-11 RX ORDER — ALPRAZOLAM 0.25 MG/1
0.25 TABLET ORAL
Qty: 30 TABLET | Refills: 0 | Status: SHIPPED | OUTPATIENT
Start: 2018-07-11 | End: 2019-07-23 | Stop reason: SDUPTHER

## 2018-07-11 RX ORDER — PANTOPRAZOLE SODIUM 40 MG/1
40 TABLET, DELAYED RELEASE ORAL DAILY
Qty: 30 TABLET | Refills: 11 | Status: SHIPPED | OUTPATIENT
Start: 2018-07-11 | End: 2020-01-22 | Stop reason: ALTCHOICE

## 2018-07-11 NOTE — ASSESSMENT & PLAN NOTE
Patient has increased GERD symptoms  This is despite treatment with Omeprazole 40mg  She will be seeing GI  I rec change to Aciphex in the meantime

## 2018-08-08 ENCOUNTER — LAB (OUTPATIENT)
Dept: LAB | Facility: CLINIC | Age: 53
End: 2018-08-08
Payer: COMMERCIAL

## 2018-08-08 ENCOUNTER — TRANSCRIBE ORDERS (OUTPATIENT)
Dept: LAB | Facility: CLINIC | Age: 53
End: 2018-08-08

## 2018-08-08 ENCOUNTER — CLINICAL SUPPORT (OUTPATIENT)
Dept: FAMILY MEDICINE CLINIC | Facility: CLINIC | Age: 53
End: 2018-08-08

## 2018-08-08 VITALS
DIASTOLIC BLOOD PRESSURE: 70 MMHG | WEIGHT: 159 LBS | SYSTOLIC BLOOD PRESSURE: 126 MMHG | BODY MASS INDEX: 26.49 KG/M2 | HEIGHT: 65 IN

## 2018-08-08 DIAGNOSIS — G44.219 EPISODIC TENSION-TYPE HEADACHE, NOT INTRACTABLE: ICD-10-CM

## 2018-08-08 DIAGNOSIS — Z78.0 MENOPAUSE: ICD-10-CM

## 2018-08-08 DIAGNOSIS — Z00.8 HEALTH EXAMINATION IN POPULATION SURVEY: Primary | ICD-10-CM

## 2018-08-08 DIAGNOSIS — G47.30 SLEEP APNEA, UNSPECIFIED TYPE: ICD-10-CM

## 2018-08-08 DIAGNOSIS — N80.9 ENDOMETRIOSIS: ICD-10-CM

## 2018-08-08 DIAGNOSIS — Z00.00 HEALTHCARE MAINTENANCE: Primary | ICD-10-CM

## 2018-08-08 LAB
ALBUMIN SERPL BCP-MCNC: 4 G/DL (ref 3.5–5)
ALP SERPL-CCNC: 90 U/L (ref 46–116)
ALT SERPL W P-5'-P-CCNC: 28 U/L (ref 12–78)
ANION GAP SERPL CALCULATED.3IONS-SCNC: 6 MMOL/L (ref 4–13)
AST SERPL W P-5'-P-CCNC: 24 U/L (ref 5–45)
BASOPHILS # BLD AUTO: 0.06 THOUSANDS/ΜL (ref 0–0.1)
BASOPHILS NFR BLD AUTO: 1 % (ref 0–1)
BILIRUB SERPL-MCNC: 0.65 MG/DL (ref 0.2–1)
BUN SERPL-MCNC: 12 MG/DL (ref 5–25)
CALCIUM SERPL-MCNC: 9.1 MG/DL (ref 8.3–10.1)
CHLORIDE SERPL-SCNC: 102 MMOL/L (ref 100–108)
CHOLEST SERPL-MCNC: 233 MG/DL (ref 50–200)
CO2 SERPL-SCNC: 30 MMOL/L (ref 21–32)
CREAT SERPL-MCNC: 0.82 MG/DL (ref 0.6–1.3)
EOSINOPHIL # BLD AUTO: 0.14 THOUSAND/ΜL (ref 0–0.61)
EOSINOPHIL NFR BLD AUTO: 3 % (ref 0–6)
ERYTHROCYTE [DISTWIDTH] IN BLOOD BY AUTOMATED COUNT: 12.3 % (ref 11.6–15.1)
EST. AVERAGE GLUCOSE BLD GHB EST-MCNC: 111 MG/DL
FERRITIN SERPL-MCNC: 141 NG/ML (ref 8–388)
FSH SERPL-ACNC: 152.1 MIU/ML
GFR SERPL CREATININE-BSD FRML MDRD: 83 ML/MIN/1.73SQ M
GLUCOSE P FAST SERPL-MCNC: 86 MG/DL (ref 65–99)
HBA1C MFR BLD: 5.5 % (ref 4.2–6.3)
HCT VFR BLD AUTO: 40 % (ref 34.8–46.1)
HDLC SERPL-MCNC: 51 MG/DL (ref 40–60)
HGB BLD-MCNC: 12.9 G/DL (ref 11.5–15.4)
IMM GRANULOCYTES # BLD AUTO: 0.02 THOUSAND/UL (ref 0–0.2)
IMM GRANULOCYTES NFR BLD AUTO: 0 % (ref 0–2)
IRON SERPL-MCNC: 72 UG/DL (ref 50–170)
LDLC SERPL CALC-MCNC: 139 MG/DL (ref 0–100)
LH SERPL-ACNC: 37 MIU/ML
LYMPHOCYTES # BLD AUTO: 0.78 THOUSANDS/ΜL (ref 0.6–4.47)
LYMPHOCYTES NFR BLD AUTO: 16 % (ref 14–44)
MCH RBC QN AUTO: 28.6 PG (ref 26.8–34.3)
MCHC RBC AUTO-ENTMCNC: 32.3 G/DL (ref 31.4–37.4)
MCV RBC AUTO: 89 FL (ref 82–98)
MONOCYTES # BLD AUTO: 0.49 THOUSAND/ΜL (ref 0.17–1.22)
MONOCYTES NFR BLD AUTO: 10 % (ref 4–12)
NEUTROPHILS # BLD AUTO: 3.39 THOUSANDS/ΜL (ref 1.85–7.62)
NEUTS SEG NFR BLD AUTO: 70 % (ref 43–75)
NONHDLC SERPL-MCNC: 182 MG/DL
NRBC BLD AUTO-RTO: 0 /100 WBCS
PLATELET # BLD AUTO: 233 THOUSANDS/UL (ref 149–390)
PMV BLD AUTO: 11 FL (ref 8.9–12.7)
POTASSIUM SERPL-SCNC: 4.1 MMOL/L (ref 3.5–5.3)
PROGEST SERPL-MCNC: <0.2 NG/ML
PROT SERPL-MCNC: 7.8 G/DL (ref 6.4–8.2)
RBC # BLD AUTO: 4.51 MILLION/UL (ref 3.81–5.12)
SODIUM SERPL-SCNC: 138 MMOL/L (ref 136–145)
T4 FREE SERPL-MCNC: 0.96 NG/DL (ref 0.76–1.46)
TESTOST SERPL-MCNC: 8 NG/DL
TIBC SERPL-MCNC: 319 UG/DL (ref 250–450)
TRIGL SERPL-MCNC: 214 MG/DL
TSH SERPL DL<=0.05 MIU/L-ACNC: 2.13 UIU/ML (ref 0.36–3.74)
WBC # BLD AUTO: 4.88 THOUSAND/UL (ref 4.31–10.16)

## 2018-08-08 PROCEDURE — 84144 ASSAY OF PROGESTERONE: CPT

## 2018-08-08 PROCEDURE — 84439 ASSAY OF FREE THYROXINE: CPT

## 2018-08-08 PROCEDURE — 82672 ASSAY OF ESTROGEN: CPT

## 2018-08-08 PROCEDURE — 83036 HEMOGLOBIN GLYCOSYLATED A1C: CPT

## 2018-08-08 PROCEDURE — 36415 COLL VENOUS BLD VENIPUNCTURE: CPT

## 2018-08-08 PROCEDURE — 80053 COMPREHEN METABOLIC PANEL: CPT

## 2018-08-08 PROCEDURE — 84443 ASSAY THYROID STIM HORMONE: CPT

## 2018-08-08 PROCEDURE — 83550 IRON BINDING TEST: CPT

## 2018-08-08 PROCEDURE — 83002 ASSAY OF GONADOTROPIN (LH): CPT

## 2018-08-08 PROCEDURE — 83001 ASSAY OF GONADOTROPIN (FSH): CPT

## 2018-08-08 PROCEDURE — 82728 ASSAY OF FERRITIN: CPT

## 2018-08-08 PROCEDURE — 85025 COMPLETE CBC W/AUTO DIFF WBC: CPT

## 2018-08-08 PROCEDURE — 84403 ASSAY OF TOTAL TESTOSTERONE: CPT

## 2018-08-08 PROCEDURE — 83540 ASSAY OF IRON: CPT

## 2018-08-08 PROCEDURE — 80061 LIPID PANEL: CPT

## 2018-08-09 DIAGNOSIS — E78.5 HYPERLIPIDEMIA, UNSPECIFIED HYPERLIPIDEMIA TYPE: Primary | ICD-10-CM

## 2018-08-09 RX ORDER — PRAVASTATIN SODIUM 20 MG
20 TABLET ORAL DAILY
Qty: 90 TABLET | Refills: 3 | Status: SHIPPED | OUTPATIENT
Start: 2018-08-09 | End: 2020-01-22 | Stop reason: ALTCHOICE

## 2018-08-10 LAB — ESTROGEN SERPL-MCNC: 41 PG/ML

## 2018-08-23 ENCOUNTER — DOCUMENTATION (OUTPATIENT)
Dept: FAMILY MEDICINE CLINIC | Facility: CLINIC | Age: 53
End: 2018-08-23

## 2018-08-23 DIAGNOSIS — K58.0 IRRITABLE BOWEL SYNDROME WITH DIARRHEA: Primary | ICD-10-CM

## 2018-08-23 RX ORDER — COLESEVELAM 180 1/1
1875 TABLET ORAL 2 TIMES DAILY WITH MEALS
Qty: 180 TABLET | Refills: 2 | Status: SHIPPED | OUTPATIENT
Start: 2018-08-23 | End: 2018-11-12 | Stop reason: ALTCHOICE

## 2018-08-29 DIAGNOSIS — M15.9 PRIMARY OSTEOARTHRITIS INVOLVING MULTIPLE JOINTS: ICD-10-CM

## 2018-08-29 RX ORDER — ORPHENADRINE CITRATE 100 MG/1
100 TABLET, EXTENDED RELEASE ORAL DAILY PRN
Qty: 30 TABLET | Refills: 5 | Status: SHIPPED | OUTPATIENT
Start: 2018-08-29 | End: 2019-07-24 | Stop reason: SDUPTHER

## 2018-09-07 ENCOUNTER — OFFICE VISIT (OUTPATIENT)
Dept: GASTROENTEROLOGY | Facility: CLINIC | Age: 53
End: 2018-09-07
Payer: COMMERCIAL

## 2018-09-07 VITALS
WEIGHT: 153.8 LBS | TEMPERATURE: 97.5 F | HEART RATE: 57 BPM | SYSTOLIC BLOOD PRESSURE: 135 MMHG | DIASTOLIC BLOOD PRESSURE: 84 MMHG | HEIGHT: 65 IN | BODY MASS INDEX: 25.62 KG/M2

## 2018-09-07 DIAGNOSIS — Z12.11 COLON CANCER SCREENING: ICD-10-CM

## 2018-09-07 DIAGNOSIS — K21.9 GASTROESOPHAGEAL REFLUX DISEASE WITHOUT ESOPHAGITIS: Primary | Chronic | ICD-10-CM

## 2018-09-07 PROCEDURE — 99203 OFFICE O/P NEW LOW 30 MIN: CPT | Performed by: INTERNAL MEDICINE

## 2018-09-07 NOTE — PATIENT INSTRUCTIONS
EGD scheduled with Dr Shraddha Walters 10/18/18 at [facility] given in office

## 2018-09-07 NOTE — PROGRESS NOTES
[facility] - Outpatient Consultation  Maria Alejandra Hdez 47 y o  female MRN: [id number]  Encounter: [id number]          ASSESSMENT AND PLAN:    47year old female referred by Dr Antony Pineda  She was seen by me 3 years ago and had EGD and colonoscopy by my partner  She had a fair colon prep so we will repeat her colonoscopy now that she is over 49 for screening  Pt can follow up as needed  ______________________________________________________________________    HPI:  47year old female referred by PCP  She had seen my partner 3 years ago and had an EGD which showed a stomach ulcer and an irregular Z line  Colonoscopy was also done  She was negative for H pylori or intestinal metaplasia in the esophagus  Had a fundic gland polyp biospied during her EGD  Colon biopsied for microscopic colitis were negative  She comes now as she has known IBS  The pt reports in August of this year a bout of diarrhea with abdominal pain  Started a [employer] for about a week and started a digestive enzyme  She denies any recent travel or sick contacts  In July, she reports starting to have a feeling in her throat with a spasm in her throat  It happens occasionally  Denies any dysphagia or odynophagia  She also has GERD and takes omeprazole daily  REVIEW OF SYSTEMS:    CONSTITUTIONAL: Denies any fever, chills, rigors, and weight loss  HEENT: No earache or tinnitus  Denies hearing loss or visual disturbances  CARDIOVASCULAR: No chest pain or palpitations  RESPIRATORY: Denies any cough, hemoptysis, shortness of breath or dyspnea on exertion  GASTROINTESTINAL: As noted in the History of Present Illness  GENITOURINARY: No problems with urination  Denies any hematuria or dysuria  NEUROLOGIC: No dizziness or vertigo, denies headaches  MUSCULOSKELETAL: Denies any muscle or joint pain  SKIN: Denies skin rashes or itching     ENDOCRINE: Denies excessive thirst  Denies intolerance to heat or cold  PSYCHOSOCIAL: Denies depression or anxiety  Denies any recent memory loss         Historical Information   Past Medical History:   Diagnosis Date    Acid reflux     Allergic reaction to dye     Resolved - [id number]    Anxiety     Arthritis     Atrophic vaginitis     Benign familial tremor     Last assessed - 36Niy5886    Cervical back pain with evidence of disc disease     Chronic otitis media of right ear     Last assessed - 06Ogd5080    Contact dermatitis     Last assessed - 95FSW4941    Dermatitis     Disturbance of smell     Last assessed - 46Nkm3375    DJD (degenerative joint disease)     Endometriosis     Last assessed - 77HEE7059    Esophageal reflux     Last assessed - 20LIM3795    Folliculitis     Last assessed - 75Njl2296    Headache     Herpes simplex     High cholesterol     Hyperlipidemia     Last assessed - 79PMZ1189    Lateral epicondylitis of left elbow     Last assessed - 22OTU6280    Lumbar back pain     Myofascial pain syndrome     Neoplasm of skin     Last assessed - 57Omb9349    Osteoarthritis     PONV (postoperative nausea and vomiting)     severe    Postmenopausal atrophic vaginitis     Last assessed - 77ZVC6777    Prophylactic antibiotic     Last assessed - 41YHX2608    Rotator cuff disorder     Sleep apnea     mild no cpap    Taste impairment     taste disturbances - Last assessed - 80Wet1011    Tremor, hereditary, benign     Wound, open, finger     Last assessed - 58NYX7117     Past Surgical History:   Procedure Laterality Date    COLONOSCOPY      DIAGNOSTIC LAPAROSCOPY      LAPAROSCOPIC ENDOMETRIOSIS FULGURATION      Laparoscop excis endometriotic tissue uterosacral ligaments    IA TOTAL HIP ARTHROPLASTY Left 5/16/2016    Procedure: ANTERIOR TOTAL HIP ARTHROPLASTY ;  Surgeon: Patricia Szymanski MD;  [facility];  Service: Orthopedics    REVISION TOTAL HIP ARTHROPLASTY  06/15/2011    TOTAL HIP ARTHROPLASTY Right     TOTAL HIP ARTHROPLASTY  10/20/2010    UPPER GASTROINTESTINAL ENDOSCOPY       Social History   History   Alcohol Use    Yes     Comment: social     History   Drug Use No     Comment: Denied history of drug use     History   Smoking Status    Never Smoker   Smokeless Tobacco    Never Used     Family History   Problem Relation Age of Onset    Hypertension Mother     Stroke Mother     Atrial fibrillation Mother     Diverticulitis Mother         of colon    Esophageal cancer Father     Arthritis Father     Stroke Father         Stroke syndrome    Arthritis Brother     Other Brother         Esophageal reflux    Other Family         Back pain    Breast cancer Family     Colon cancer Family     Diabetes Family        Meds/Allergies       Current Outpatient Prescriptions:     acetaminophen (TYLENOL) 325 mg tablet    ALPRAZolam (XANAX) 0 25 mg tablet    ascorbic acid (VITAMIN C) 500 mg tablet    Calcium-Vitamin D (CALTRATE 600 PLUS-VIT D PO)    diclofenac (VOLTAREN) 75 mg EC tablet    dicyclomine (BENTYL) 20 mg tablet    glucosamine-chondroitin 500-400 MG tablet    methocarbamol (ROBAXIN) 750 mg tablet    Multiple Vitamins-Minerals (MULTIVITAMIN WITH MINERALS) tablet    orphenadrine (NORFLEX) 100 mg tablet    pantoprazole (PROTONIX) 40 mg tablet    pravastatin (PRAVACHOL) 20 mg tablet    valACYclovir (VALTREX) 1,000 mg tablet    zinc gluconate 50 mg tablet    colesevelam (WELCHOL) 625 mg tablet    oxyCODONE (ROXICODONE) 5 mg immediate release tablet    TiZANidine (ZANAFLEX) 4 MG capsule    Allergies   Allergen Reactions    Iodinated Diagnostic Agents Swelling and Eye Swelling     IVP dye specifically; swollen eye lid shut    Morphine Vomiting     Reaction Date: [id number];     Morphine And Related      DENIES ALLERGY RELATES N/V TO ANESTHESIA    Vicodin [Hydrocodone-Acetaminophen]      No allergy to tylenol   Does have N/V to vicoden    Gentamicin Rash     Reaction Date: [id number]; Objective     Blood pressure 135/84, pulse 57, temperature 97 5 °F (36 4 °C), temperature source Tympanic, height 5' 5" (1 651 m), weight 69 8 kg (153 lb 12 8 oz)  Body mass index is 25 59 kg/m²  PHYSICAL EXAM:      General Appearance:   Alert, cooperative, no distress   HEENT:   Normocephalic, atraumatic, anicteric      Neck:  Supple, symmetrical, trachea midline   Lungs:   Clear to auscultation bilaterally; no rales, rhonchi or wheezing; respirations unlabored    Heart[de-identified]   Regular rate and rhythm; no murmur, rub, or gallop  Abdomen:   Soft, non-tender, non-distended; normal bowel sounds; no masses, no organomegaly    Genitalia:   Deferred    Rectal:   Deferred    Extremities:  No cyanosis, clubbing or edema    Pulses:  2+ and symmetric    Skin:  No jaundice, rashes, or lesions    Lymph nodes:  No palpable cervical lymphadenopathy        Lab Results:   No visits with results within 1 Day(s) from this visit     Latest known visit with results is:   Lab on 08/08/2018   Component Date Value    WBC 08/08/2018 4 88     RBC 08/08/2018 4 51     Hemoglobin 08/08/2018 12 9     Hematocrit 08/08/2018 40 0     MCV 08/08/2018 89     MCH 08/08/2018 28 6     MCHC 08/08/2018 32 3     RDW 08/08/2018 12 3     MPV 08/08/2018 11 0     Platelets 91/36/6852 233     nRBC 08/08/2018 0     Neutrophils Relative 08/08/2018 70     Immat GRANS % 08/08/2018 0     Lymphocytes Relative 08/08/2018 16     Monocytes Relative 08/08/2018 10     Eosinophils Relative 08/08/2018 3     Basophils Relative 08/08/2018 1     Neutrophils Absolute 08/08/2018 3 39     Immature Grans Absolute 08/08/2018 0 02     Lymphocytes Absolute 08/08/2018 0 78     Monocytes Absolute 08/08/2018 0 49     Eosinophils Absolute 08/08/2018 0 14     Basophils Absolute 08/08/2018 0 06     Sodium 08/08/2018 138     Potassium 08/08/2018 4 1     Chloride 08/08/2018 102     CO2 08/08/2018 30     ANION GAP 08/08/2018 6     BUN 08/08/2018 12     Creatinine 08/08/2018 0 82     Glucose, Fasting 08/08/2018 86     Calcium 08/08/2018 9 1     AST 08/08/2018 24     ALT 08/08/2018 28     Alkaline Phosphatase 08/08/2018 90     Total Protein 08/08/2018 7 8     Albumin 08/08/2018 4 0     Total Bilirubin 08/08/2018 0 65     eGFR 08/08/2018 83     TSH 3RD GENERATON 08/08/2018 2 130     Free T4 08/08/2018 0 96     FSH 08/08/2018 152 1     LH 08/08/2018 37 0     Estrogen 08/08/2018 41     Progesterone 08/08/2018 <0 20     Testosterone 08/08/2018 8 0     Iron 08/08/2018 72     Ferritin 08/08/2018 141     TIBC 08/08/2018 319          Radiology Results:   No results found

## 2018-09-07 NOTE — LETTER
September 7, 2018     Samanta Teixeira MD   [facility] [address]  [place] [address] [address]    Patient: Radha Carmichael   YOB: 1965   Date of Visit: 9/7/2018       Dear Dr Jonathan Lei: Thank you for referring Landon Pinon to me for evaluation  Below are my notes for this consultation  If you have questions, please do not hesitate to call me  I look forward to following your patient along with you  Sincerely,        [employer], DO        CC: No Recipients  [employer], DO  9/7/2018 11:00 AM  Sign at close encounter  [facility] - Outpatient Consultation  Maria Alejandra Eloy Ashley 47 y o  female MRN: [id number]  Encounter: [id number]          ASSESSMENT AND PLAN:    47year old female referred by Dr Jonathan Lei  She was seen by me 3 years ago and had EGD and colonoscopy by my partner  ______________________________________________________________________    HPI:  47year old female referred by PCP  She had seen my partner 3 years ago and had an EGD which showed a stomach ulcer and an irregular Z line  Colonoscopy was also done  She was negative for H pylori or intestinal metaplasia in the esophagus  Had a fundic gland polyp biospied during her EGD  Colon biopsied for microscopic colitis were negative  She comes now as she has known IBS  The pt reports in August of this year a bout of diarrhea with abdominal pain  Started a [employer] for about a week and started a digestive enzyme  She denies any recent travel or sick contacts  In July, she reports starting to have a feeling in her throat with a spasm in her throat  It happens occasionally  Denies any dysphagia or odynophagia  She also has GERD and takes omeprazole daily  REVIEW OF SYSTEMS:    CONSTITUTIONAL: Denies any fever, chills, rigors, and weight loss  HEENT: No earache or tinnitus  Denies hearing loss or visual disturbances  CARDIOVASCULAR: No chest pain or palpitations     RESPIRATORY: Denies any cough, hemoptysis, shortness of breath or dyspnea on exertion  GASTROINTESTINAL: As noted in the History of Present Illness  GENITOURINARY: No problems with urination  Denies any hematuria or dysuria  NEUROLOGIC: No dizziness or vertigo, denies headaches  MUSCULOSKELETAL: Denies any muscle or joint pain  SKIN: Denies skin rashes or itching  ENDOCRINE: Denies excessive thirst  Denies intolerance to heat or cold  PSYCHOSOCIAL: Denies depression or anxiety  Denies any recent memory loss         Historical Information   Past Medical History:   Diagnosis Date    Acid reflux     Allergic reaction to dye     Resolved - [id number]    Anxiety     Arthritis     Atrophic vaginitis     Benign familial tremor     Last assessed - 72Ysn2136    Cervical back pain with evidence of disc disease     Chronic otitis media of right ear     Last assessed - 59Ryz1073    Contact dermatitis     Last assessed - 80SXF8226    Dermatitis     Disturbance of smell     Last assessed - 29Apr2013    DJD (degenerative joint disease)     Endometriosis     Last assessed - 53YIV5619    Esophageal reflux     Last assessed - 71KTB3902    Folliculitis     Last assessed - 52Bng1202    Headache     Herpes simplex     High cholesterol     Hyperlipidemia     Last assessed - 39UOH3338    Lateral epicondylitis of left elbow     Last assessed - 31YAH3376    Lumbar back pain     Myofascial pain syndrome     Neoplasm of skin     Last assessed - 82Qqb6913    Osteoarthritis     PONV (postoperative nausea and vomiting)     severe    Postmenopausal atrophic vaginitis     Last assessed - 07NXR9062    Prophylactic antibiotic     Last assessed - 22FIP8667    Rotator cuff disorder     Sleep apnea     mild no cpap    Taste impairment     taste disturbances - Last assessed - 29Apr2013    Tremor, hereditary, benign     Wound, open, finger     Last assessed - 93JEA2104     Past Surgical History:   Procedure Laterality Date    COLONOSCOPY      DIAGNOSTIC LAPAROSCOPY      LAPAROSCOPIC ENDOMETRIOSIS FULGURATION      Laparoscop excis endometriotic tissue uterosacral ligaments    CT TOTAL HIP ARTHROPLASTY Left 5/16/2016    Procedure: ANTERIOR TOTAL HIP ARTHROPLASTY ;  Surgeon: Madison Everett MD;  [facility];  Service: Orthopedics    REVISION TOTAL HIP ARTHROPLASTY  06/15/2011    TOTAL HIP ARTHROPLASTY Right     TOTAL HIP ARTHROPLASTY  10/20/2010    UPPER GASTROINTESTINAL ENDOSCOPY       Social History   History   Alcohol Use    Yes     Comment: social     History   Drug Use No     Comment: Denied history of drug use     History   Smoking Status    Never Smoker   Smokeless Tobacco    Never Used     Family History   Problem Relation Age of Onset    Hypertension Mother     Stroke Mother     Atrial fibrillation Mother     Diverticulitis Mother         of colon    Esophageal cancer Father     Arthritis Father     Stroke Father         Stroke syndrome    Arthritis Brother     Other Brother         Esophageal reflux    Other Family         Back pain    Breast cancer Family     Colon cancer Family     Diabetes Family        Meds/Allergies       Current Outpatient Prescriptions:     acetaminophen (TYLENOL) 325 mg tablet    ALPRAZolam (XANAX) 0 25 mg tablet    ascorbic acid (VITAMIN C) 500 mg tablet    Calcium-Vitamin D (CALTRATE 600 PLUS-VIT D PO)    diclofenac (VOLTAREN) 75 mg EC tablet    dicyclomine (BENTYL) 20 mg tablet    glucosamine-chondroitin 500-400 MG tablet    methocarbamol (ROBAXIN) 750 mg tablet    Multiple Vitamins-Minerals (MULTIVITAMIN WITH MINERALS) tablet    orphenadrine (NORFLEX) 100 mg tablet    pantoprazole (PROTONIX) 40 mg tablet    pravastatin (PRAVACHOL) 20 mg tablet    valACYclovir (VALTREX) 1,000 mg tablet    zinc gluconate 50 mg tablet    colesevelam (WELCHOL) 625 mg tablet    oxyCODONE (ROXICODONE) 5 mg immediate release tablet    TiZANidine (ZANAFLEX) 4 MG capsule    Allergies   Allergen Reactions    Iodinated Diagnostic Agents Swelling and Eye Swelling     IVP dye specifically; swollen eye lid shut    Morphine Vomiting     Reaction Date: [id number];     Morphine And Related      DENIES ALLERGY RELATES N/V TO ANESTHESIA    Vicodin [Hydrocodone-Acetaminophen]      No allergy to tylenol   Does have N/V to vicoden    Gentamicin Rash     Reaction Date: [id number];        Objective     Blood pressure 135/84, pulse 57, temperature 97 5 °F (36 4 °C), temperature source Tympanic, height 5' 5" (1 651 m), weight 69 8 kg (153 lb 12 8 oz)  Body mass index is 25 59 kg/m²  PHYSICAL EXAM:      General Appearance:   Alert, cooperative, no distress   HEENT:   Normocephalic, atraumatic, anicteric      Neck:  Supple, symmetrical, trachea midline   Lungs:   Clear to auscultation bilaterally; no rales, rhonchi or wheezing; respirations unlabored    Heart[de-identified]   Regular rate and rhythm; no murmur, rub, or gallop  Abdomen:   Soft, non-tender, non-distended; normal bowel sounds; no masses, no organomegaly    Genitalia:   Deferred    Rectal:   Deferred    Extremities:  No cyanosis, clubbing or edema    Pulses:  2+ and symmetric    Skin:  No jaundice, rashes, or lesions    Lymph nodes:  No palpable cervical lymphadenopathy        Lab Results:   No visits with results within 1 Day(s) from this visit     Latest known visit with results is:   Lab on 08/08/2018   Component Date Value    WBC 08/08/2018 4 88     RBC 08/08/2018 4 51     Hemoglobin 08/08/2018 12 9     Hematocrit 08/08/2018 40 0     MCV 08/08/2018 89     MCH 08/08/2018 28 6     MCHC 08/08/2018 32 3     RDW 08/08/2018 12 3     MPV 08/08/2018 11 0     Platelets 94/39/6866 233     nRBC 08/08/2018 0     Neutrophils Relative 08/08/2018 70     Immat GRANS % 08/08/2018 0     Lymphocytes Relative 08/08/2018 16     Monocytes Relative 08/08/2018 10     Eosinophils Relative 08/08/2018 3     Basophils Relative 08/08/2018 1  Neutrophils Absolute 08/08/2018 3 39     Immature Grans Absolute 08/08/2018 0 02     Lymphocytes Absolute 08/08/2018 0 78     Monocytes Absolute 08/08/2018 0 49     Eosinophils Absolute 08/08/2018 0 14     Basophils Absolute 08/08/2018 0 06     Sodium 08/08/2018 138     Potassium 08/08/2018 4 1     Chloride 08/08/2018 102     CO2 08/08/2018 30     ANION GAP 08/08/2018 6     BUN 08/08/2018 12     Creatinine 08/08/2018 0 82     Glucose, Fasting 08/08/2018 86     Calcium 08/08/2018 9 1     AST 08/08/2018 24     ALT 08/08/2018 28     Alkaline Phosphatase 08/08/2018 90     Total Protein 08/08/2018 7 8     Albumin 08/08/2018 4 0     Total Bilirubin 08/08/2018 0 65     eGFR 08/08/2018 83     TSH 3RD GENERATON 08/08/2018 2 130     Free T4 08/08/2018 0 96     FSH 08/08/2018 152 1     LH 08/08/2018 37 0     Estrogen 08/08/2018 41     Progesterone 08/08/2018 <0 20     Testosterone 08/08/2018 8 0     Iron 08/08/2018 72     Ferritin 08/08/2018 141     TIBC 08/08/2018 319          Radiology Results:   No results found

## 2018-09-20 ENCOUNTER — TRANSCRIBE ORDERS (OUTPATIENT)
Dept: ADMINISTRATIVE | Facility: HOSPITAL | Age: 53
End: 2018-09-20

## 2018-09-20 DIAGNOSIS — Z12.39 SCREENING BREAST EXAMINATION: Primary | ICD-10-CM

## 2018-10-15 ENCOUNTER — TELEPHONE (OUTPATIENT)
Dept: GASTROENTEROLOGY | Facility: AMBULARY SURGERY CENTER | Age: 53
End: 2018-10-15

## 2018-10-15 NOTE — TELEPHONE ENCOUNTER
Pt rescheduled procedure due to transportation issues  Procedure rescheduled with Dr Motta at [facility] on 12/6/2018

## 2018-10-15 NOTE — TELEPHONE ENCOUNTER
Dr Motta's pt called requesting to resched her procedure that is sched for 10/18/18  Please call pt to resched

## 2018-11-12 ENCOUNTER — HOSPITAL ENCOUNTER (OUTPATIENT)
Dept: CT IMAGING | Facility: HOSPITAL | Age: 53
Discharge: HOME | End: 2018-11-12
Payer: COMMERCIAL

## 2018-11-12 ENCOUNTER — HOSPITAL ENCOUNTER (OUTPATIENT)
Dept: RADIOLOGY | Facility: HOSPITAL | Age: 53
Discharge: HOME | End: 2018-11-12
Payer: COMMERCIAL

## 2018-11-12 ENCOUNTER — OFFICE VISIT (OUTPATIENT)
Dept: FAMILY MEDICINE CLINIC | Facility: CLINIC | Age: 53
End: 2018-11-12
Payer: COMMERCIAL

## 2018-11-12 VITALS
HEIGHT: 65 IN | HEART RATE: 60 BPM | WEIGHT: 158 LBS | BODY MASS INDEX: 26.33 KG/M2 | SYSTOLIC BLOOD PRESSURE: 132 MMHG | DIASTOLIC BLOOD PRESSURE: 62 MMHG

## 2018-11-12 DIAGNOSIS — S13.9XXA NECK SPRAIN, INITIAL ENCOUNTER: ICD-10-CM

## 2018-11-12 DIAGNOSIS — S40.012A CONTUSION OF LEFT SHOULDER, INITIAL ENCOUNTER: ICD-10-CM

## 2018-11-12 DIAGNOSIS — S90.02XA CONTUSION OF LEFT ANKLE, INITIAL ENCOUNTER: ICD-10-CM

## 2018-11-12 DIAGNOSIS — S09.90XA TRAUMATIC INJURY OF HEAD, INITIAL ENCOUNTER: ICD-10-CM

## 2018-11-12 DIAGNOSIS — S00.01XA ABRASION OF SCALP, INITIAL ENCOUNTER: ICD-10-CM
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
## 2018-11-12 PROCEDURE — 72040 X-RAY EXAM NECK SPINE 2-3 VW: CPT

## 2018-11-12 PROCEDURE — 3008F BODY MASS INDEX DOCD: CPT | Performed by: FAMILY MEDICINE

## 2018-11-12 PROCEDURE — 70450 CT HEAD/BRAIN W/O DYE: CPT

## 2018-11-12 PROCEDURE — 99214 OFFICE O/P EST MOD 30 MIN: CPT | Performed by: FAMILY MEDICINE

## 2018-11-12 PROCEDURE — 73030 X-RAY EXAM OF SHOULDER: CPT

## 2018-11-12 PROCEDURE — 73610 X-RAY EXAM OF ANKLE: CPT

## 2018-11-12 NOTE — PATIENT INSTRUCTIONS
Complete CT scan of head, complete x-rays of cervical spine, left shoulder, and left ankle today  Patient use ice 15 minutes 3-4 times daily for 15 minutes  Use pain medication    Return in 24-48 hours if still symptoms

## 2018-11-12 NOTE — PROGRESS NOTES
Assessment/Plan:    1  Fall 2  Head trauma 3  Cervical spine contusion/strain 4  Shoulder contusion 5  Left ankle contusion  Stat CT head, cervical spine, shoulder, ankle x-rays ordered patient use ice 15 minutes 3-4 times daily  Patient has pain medication at home to use if needed  If not improving in 24-48 hours patient will call office  Tetanus immunizations up-to-date     Diagnoses and all orders for this visit:    Fall, initial encounter  -     CT head wo contrast; Future  -     XR spine cervical 2 or 3 vw injury; Future  -     XR shoulder 2+ vw left; Future  -     XR ankle 3+ vw left; Future    Traumatic injury of head, initial encounter  -     CT head wo contrast; Future  -     XR spine cervical 2 or 3 vw injury; Future  -     XR shoulder 2+ vw left; Future  -     XR ankle 3+ vw left; Future    Abrasion of scalp, initial encounter  -     CT head wo contrast; Future  -     XR spine cervical 2 or 3 vw injury; Future  -     XR shoulder 2+ vw left; Future  -     XR ankle 3+ vw left; Future    Contusion of left shoulder, initial encounter  -     CT head wo contrast; Future  -     XR spine cervical 2 or 3 vw injury; Future  -     XR shoulder 2+ vw left; Future  -     XR ankle 3+ vw left; Future    Neck sprain, initial encounter  -     CT head wo contrast; Future  -     XR spine cervical 2 or 3 vw injury; Future  -     XR shoulder 2+ vw left; Future  -     XR ankle 3+ vw left; Future    Contusion of left ankle, initial encounter  -     CT head wo contrast; Future  -     XR spine cervical 2 or 3 vw injury; Future  -     XR shoulder 2+ vw left; Future  -     XR ankle 3+ vw left; Future          Subjective: Pt c/o head laceration on head and cut on neck and ear  Also neck, head, shoulder and ankle pain from fall down the stairs last night  No LOC  She states everything on the left side hurts due to the fall yesterday  kw     Patient ID: Toby Steel is a 47 y o  female  Patient fell last night on steps  Hitting her head with a scalp laceration minor abrasion of shoulder  Twisted left ankle  Patient still has some headache neck pain shoulder and ankle pain  Patient is up-to-date with tetanus  Patient has no loss of conscious no vision change no nausea vomiting  The following portions of the patient's history were reviewed and updated as appropriate: allergies, current medications, past family history, past medical history, past social history, past surgical history and problem list     Review of Systems   Constitutional: Negative  HENT:        Left scalp abrasion and left external ear contusion   Eyes: Negative  Respiratory: Negative  Cardiovascular: Negative  Gastrointestinal:        HPI   Endocrine: Negative  Genitourinary: Negative  Musculoskeletal:        HPI   Skin:        HPI   Allergic/Immunologic: Negative  Neurological:        HPI   Hematological: Negative  Objective:      /62 (BP Location: Left arm)   Pulse 60   Ht 5' 5" (1 651 m)   Wt 71 7 kg (158 lb)   BMI 26 29 kg/m²          Physical Exam   Constitutional: She is oriented to person, place, and time  She appears well-developed and well-nourished  HENT:   Head: Normocephalic  Right Ear: External ear normal    Nose: Nose normal    Mouth/Throat: Oropharynx is clear and moist  No oropharyngeal exudate  Minor scalp laceration left parietal, left external ear tenderness negative gross deformity negative abrasion   Eyes: Pupils are equal, round, and reactive to light  Conjunctivae and EOM are normal    Neck: No thyromegaly present  Mild paravertebral muscle contraction cervical spine negative point tenderness negative gross deformity   Cardiovascular: Normal rate, regular rhythm and normal heart sounds  Pulmonary/Chest: Effort normal and breath sounds normal    Abdominal: Soft  Bowel sounds are normal  There is no tenderness     Musculoskeletal:   Left shoulder negative gross deformity mild pain greater than 90° abduction  Left ankle mild tenderness lateral malleolar negative gross deformity   Lymphadenopathy:     She has no cervical adenopathy  Neurological: She is alert and oriented to person, place, and time  She has normal reflexes  She displays normal reflexes  No cranial nerve deficit  She exhibits normal muscle tone  Coordination normal    Skin:   Minor abrasion left ankle positive ecchymosis left calf   Psychiatric: She has a normal mood and affect

## 2018-11-30 ENCOUNTER — TRANSCRIBE ORDERS (OUTPATIENT)
Dept: ADMINISTRATIVE | Facility: HOSPITAL | Age: 53
End: 2018-11-30

## 2018-11-30 ENCOUNTER — HOSPITAL ENCOUNTER (OUTPATIENT)
Dept: MAMMOGRAPHY | Facility: HOSPITAL | Age: 53
Discharge: HOME | End: 2018-11-30
Payer: COMMERCIAL

## 2018-11-30 VITALS — WEIGHT: 158 LBS | HEIGHT: 65 IN | BODY MASS INDEX: 26.33 KG/M2

## 2018-11-30 DIAGNOSIS — Z12.39 SCREENING BREAST EXAMINATION: ICD-10-CM

## 2018-11-30 PROCEDURE — 77063 BREAST TOMOSYNTHESIS BI: CPT

## 2018-11-30 PROCEDURE — 77067 SCR MAMMO BI INCL CAD: CPT

## 2018-12-04 DIAGNOSIS — B00.1 HERPES LABIALIS: Primary | ICD-10-CM

## 2018-12-04 RX ORDER — VALACYCLOVIR HYDROCHLORIDE 1 G/1
1000 TABLET, FILM COATED ORAL DAILY PRN
Qty: 4 TABLET | Refills: 5 | Status: SHIPPED | OUTPATIENT
Start: 2018-12-04 | End: 2019-12-12 | Stop reason: SDUPTHER

## 2018-12-06 ENCOUNTER — ANESTHESIA EVENT (OUTPATIENT)
Dept: GASTROENTEROLOGY | Facility: MEDICAL CENTER | Age: 53
End: 2018-12-06
Payer: COMMERCIAL

## 2018-12-06 ENCOUNTER — ANESTHESIA (OUTPATIENT)
Dept: GASTROENTEROLOGY | Facility: MEDICAL CENTER | Age: 53
End: 2018-12-06
Payer: COMMERCIAL

## 2018-12-06 ENCOUNTER — HOSPITAL ENCOUNTER (OUTPATIENT)
Facility: MEDICAL CENTER | Age: 53
Setting detail: OUTPATIENT SURGERY
Discharge: HOME | End: 2018-12-06
Attending: INTERNAL MEDICINE | Admitting: INTERNAL MEDICINE
Payer: COMMERCIAL

## 2018-12-06 VITALS
SYSTOLIC BLOOD PRESSURE: 129 MMHG | HEART RATE: 55 BPM | DIASTOLIC BLOOD PRESSURE: 74 MMHG | TEMPERATURE: 97.8 F | OXYGEN SATURATION: 96 % | RESPIRATION RATE: 16 BRPM

## 2018-12-06 DIAGNOSIS — Z12.11 COLON CANCER SCREENING: ICD-10-CM

## 2018-12-06 PROCEDURE — 45380 COLONOSCOPY AND BIOPSY: CPT | Performed by: INTERNAL MEDICINE

## 2018-12-06 PROCEDURE — 43239 EGD BIOPSY SINGLE/MULTIPLE: CPT | Performed by: INTERNAL MEDICINE

## 2018-12-06 PROCEDURE — 88305 TISSUE EXAM BY PATHOLOGIST: CPT | Performed by: PATHOLOGY

## 2018-12-06 RX ORDER — SODIUM CHLORIDE 9 MG/ML
125 INJECTION, SOLUTION INTRAVENOUS CONTINUOUS
Status: DISCONTINUED | OUTPATIENT
Start: 2018-12-06 | End: 2018-12-06 | Stop reason: HOSPADM

## 2018-12-06 RX ORDER — PROPOFOL 10 MG/ML
INJECTION, EMULSION INTRAVENOUS AS NEEDED
Status: DISCONTINUED | OUTPATIENT
Start: 2018-12-06 | End: 2018-12-06 | Stop reason: SURG

## 2018-12-06 RX ADMIN — PROPOFOL 50 MG: 10 INJECTION, EMULSION INTRAVENOUS at 14:42

## 2018-12-06 RX ADMIN — PROPOFOL 50 MG: 10 INJECTION, EMULSION INTRAVENOUS at 14:35

## 2018-12-06 RX ADMIN — PROPOFOL 50 MG: 10 INJECTION, EMULSION INTRAVENOUS at 14:49

## 2018-12-06 RX ADMIN — PROPOFOL 50 MG: 10 INJECTION, EMULSION INTRAVENOUS at 14:18

## 2018-12-06 RX ADMIN — PROPOFOL 50 MG: 10 INJECTION, EMULSION INTRAVENOUS at 14:22

## 2018-12-06 RX ADMIN — PROPOFOL 100 MG: 10 INJECTION, EMULSION INTRAVENOUS at 14:17

## 2018-12-06 RX ADMIN — PROPOFOL 50 MG: 10 INJECTION, EMULSION INTRAVENOUS at 14:27

## 2018-12-06 RX ADMIN — PROPOFOL 50 MG: 10 INJECTION, EMULSION INTRAVENOUS at 14:19

## 2018-12-06 RX ADMIN — PROPOFOL 50 MG: 10 INJECTION, EMULSION INTRAVENOUS at 14:55

## 2018-12-06 RX ADMIN — SODIUM CHLORIDE 125 ML/HR: 0.9 INJECTION, SOLUTION INTRAVENOUS at 14:02

## 2018-12-06 RX ADMIN — PROPOFOL 50 MG: 10 INJECTION, EMULSION INTRAVENOUS at 14:31

## 2018-12-06 NOTE — H&P
History and Physical -  [facility]  Maria Alejandra Cool Semen 47 y o  female MRN: [id number]      HPI: Talita Vieira is a 47y o  year old female who presents for EGD and colonoscopy  REVIEW OF SYSTEMS: Per the HPI, and otherwise unremarkable      Historical Information   Past Medical History:   Diagnosis Date    Acid reflux     Allergic reaction to dye     Resolved - [id number]    Anxiety     Arthritis     Atrophic vaginitis     Benign familial tremor     Last assessed - [id number]    Cervical back pain with evidence of disc disease     Chronic otitis media of right ear     Last assessed - 29Jan2015    Contact dermatitis     Last assessed - [id number]    Dermatitis     Disturbance of smell     Last assessed - 29Apr2013    DJD (degenerative joint disease)     Endometriosis     Last assessed - [id number]    Esophageal reflux     Last assessed - 14VEO2650    Folliculitis     Last assessed - 24Apr2013    Headache     Herpes simplex     High cholesterol     Hyperlipidemia     Last assessed - 50YDN6645    Lateral epicondylitis of left elbow     Last assessed - 31GCV2044    Lumbar back pain     Myofascial pain syndrome     Neoplasm of skin     Last assessed - 74Umy6451    Osteoarthritis     PONV (postoperative nausea and vomiting)     severe    Postmenopausal atrophic vaginitis     Last assessed - 17FDQ2786    Prophylactic antibiotic     Last assessed - 64TKW0600    Rotator cuff disorder     Sleep apnea     mild no cpap    Taste impairment     taste disturbances - Last assessed - 29Apr2013    Tremor, hereditary, benign     Wound, open, finger     Last assessed - 71EIS7016     Past Surgical History:   Procedure Laterality Date    COLONOSCOPY      DIAGNOSTIC LAPAROSCOPY      LAPAROSCOPIC ENDOMETRIOSIS FULGURATION      Laparoscop excis endometriotic tissue uterosacral ligaments    MO TOTAL HIP ARTHROPLASTY Left 5/16/2016    Procedure: ANTERIOR TOTAL HIP ARTHROPLASTY ;  Surgeon: Taft Primrose India Malhotra MD;  Location: [facility];  Service: Orthopedics    REVISION TOTAL HIP ARTHROPLASTY  06/15/2011    TOTAL HIP ARTHROPLASTY Right     TOTAL HIP ARTHROPLASTY  10/20/2010    UPPER GASTROINTESTINAL ENDOSCOPY       Social History   History   Alcohol Use    Yes     Comment: social     History   Drug Use No     Comment: Denied history of drug use     History   Smoking Status    Never Smoker   Smokeless Tobacco    Never Used     Family History   Problem Relation Age of Onset    Hypertension Mother     Stroke Mother     Atrial fibrillation Mother     Diverticulitis Mother         of colon    Esophageal cancer Father     Arthritis Father     Stroke Father         Stroke syndrome    Arthritis Brother     Other Brother         Esophageal reflux    Other Family         Back pain    Breast cancer Family     Colon cancer Family     Diabetes Family        Meds/Allergies     Prescriptions Prior to Admission   Medication    acetaminophen (TYLENOL) 325 mg tablet    ALPRAZolam (XANAX) 0 25 mg tablet    ascorbic acid (VITAMIN C) 500 mg tablet    Calcium-Vitamin D (CALTRATE 600 PLUS-VIT D PO)    diclofenac (VOLTAREN) 75 mg EC tablet    dicyclomine (BENTYL) 20 mg tablet    glucosamine-chondroitin 500-400 MG tablet    methocarbamol (ROBAXIN) 750 mg tablet    Multiple Vitamins-Minerals (MULTIVITAMIN WITH MINERALS) tablet    Na Sulfate-K Sulfate-Mg Sulf (SUPREP BOWEL PREP KIT) 17 5-3 13-1 6 GM/180ML SOLN    orphenadrine (NORFLEX) 100 mg tablet    pantoprazole (PROTONIX) 40 mg tablet    pravastatin (PRAVACHOL) 20 mg tablet    TiZANidine (ZANAFLEX) 4 MG capsule    valACYclovir (VALTREX) 1,000 mg tablet    zinc gluconate 50 mg tablet       Allergies   Allergen Reactions    Iodinated Diagnostic Agents Swelling and Eye Swelling     IVP dye specifically; swollen eye lid shut    Morphine Vomiting     Reaction Date: [id number];     Morphine And Related      DENIES ALLERGY RELATES N/V TO ANESTHESIA    Vicodin [Hydrocodone-Acetaminophen]      No allergy to tylenol   Does have N/V to vicoden    Gentamicin Rash     Reaction Date: [id number];        Objective     There were no vitals taken for this visit  PHYSICAL EXAM    Gen: NAD  CV: RRR  CHEST: Clear  ABD: soft, NT/ND  EXT: no edema      ASSESSMENT/PLAN:  This is a 47y o  year old female here for EGD and colonoscopy, and she is stable and optimized for her procedure

## 2018-12-06 NOTE — ANESTHESIA POSTPROCEDURE EVALUATION
Post-Op Assessment Note      CV Status:  Stable    Mental Status:  Alert and awake    Hydration Status:  Euvolemic    PONV Controlled:  Controlled    Airway Patency:  Patent    Post Op Vitals Reviewed: Yes          Staff: Anesthesiologist           /74 (12/06/18 1522)    Temp      Pulse 55 (12/06/18 1522)   Resp 16 (12/06/18 1522)    SpO2 96 % (12/06/18 1522)

## 2018-12-06 NOTE — DISCHARGE INSTRUCTIONS

## 2018-12-06 NOTE — DISCHARGE INSTR - AVS FIRST PAGE
OPERATIVE REPORT  PATIENT NAME: Sherlyn Mcneil    :  1965  MRN: [id number]  Pt Location: [facility] GI ROOM 01    SURGERY DATE: 2018    Surgeon(s) and Role:     * Matthew Bhatti DO - Primary    Preop Diagnosis:  Colon cancer screening [Z12 11]    Post-Op Diagnosis Codes:     * Colon cancer screening [Z12 11]    Procedure(s) (LRB):  ESOPHAGOGASTRODUODENOSCOPY (EGD) (N/A)  COLONOSCOPY (N/A)    Specimen(s):  ID Type Source Tests Collected by Time Destination   1 : normal duodenum Tissue Duodenum TISSUE EXAM Yovany DO Davy 2018  2:22 PM    2 : sigmoid mild colitis Tissue Colon TISSUE EXAM Matthew Bhatti DO 2018  2:58 PM        Estimated Blood Loss:   Minimal    Drains:       Anesthesia Type:   Choice    Operative Indications:  Colon cancer screening [Z12 11]  ESOPHAGOGASTRODUODENOSCOPY    PROCEDURE: EGD    SEDATION: Monitored anesthesia care, check anesthesia records    ASA Class: 2    INDICATIONS:  GERD    CONSENT:  Informed consent was obtained for the procedure, including sedation after explaining the risks and benefits of the procedure  Risks including but not limited to bleeding, perforation, infection, and missed lesion  PREPARATION:   Telemetry, pulse oximetry, blood pressure were monitored throughout the procedure  Patient was identified by myself both verbally and by visual inspection of ID band  DESCRIPTION:   Patient was placed in the left lateral decubitus position and was sedated with the above medication  The gastroscope was introduced in to the oropharynx and the esophagus was intubated under direct visualization  Scope was passed down the esophagus up to 2nd part of the duodenum  A careful inspection was made as the gastroscope was withdrawn, including a retroflexed view of the stomach; findings and interventions are described below  FINDINGS:    #1  Esophagus- normal esophagus    #2  Stomach- normal stomach on direct and retroflexed views    #3   Duodenum- normal 1st and 2nd part of the duodenum, biopsied with cold biopsy forceps         IMPRESSIONS:      Normal EGD  Biopsied from the duodenum  RECOMMENDATIONS:     Await biopsy results  COMPLICATIONS:  None; patient tolerated the procedure well  DISPOSITION: PACU           CONDITION: Stable    Colonoscopy Procedure Note    Procedure: Colonoscopy    Sedation: Monitored anesthesia care, check anesthesia records      ASA Class: 2    INDICATIONS:  Colon cancer screening    POST-OP DIAGNOSIS: See the impression below    Procedure Details     Prior colonoscopy: No prior colonoscopy  Informed consent was obtained for the procedure, including sedation  Risks of perforation, hemorrhage, adverse drug reaction and aspiration were discussed  The patient was placed in the left lateral decubitus position  Based on the pre-procedure assessment, including review of the patient's medical history, medications, allergies, and review of systems, she had been deemed to be an appropriate candidate for conscious sedation; she was therefore sedated with the medications listed below  The patient was monitored continuously with telemetry, pulse oximetry, blood pressure monitoring, and direct observations  A rectal examination was performed  The colonoscope was inserted into the rectum and advanced under direct vision to the cecum, which was identified by the ileocecal valve and appendiceal orifice  The quality of the colonic preparation was good  A careful inspection was made as the colonoscope was withdrawn, including a retroflexed view of the rectum; findings and interventions are described below  Findings: The colon was normal except for extremely tortuous  Multiple patient positions change in order to obtain views of the cecum  Complications: None; patient tolerated the procedure well  Impression:    The colon was normal except for extremely tortuous and mild non specific colitis in the sigmoid colon  Multiple patient positions change in order to obtain views of the cecum  Colon biopsies obtained from the nonspecific colitis  Recommendations:  Await biopsy results  Repeat colonoscopy in 10 years for screening purposes        Carmela Osborn DO  DATE: December 6, 2018  TIME: 3:08 PM

## 2018-12-06 NOTE — ANESTHESIA PREPROCEDURE EVALUATION
Review of Systems/Medical History  Patient summary reviewed    History of anesthetic complications PONV    Cardiovascular  Negative cardio ROS Hyperlipidemia,    Pulmonary  Negative pulmonary ROS Sleep apnea CPAP,        GI/Hepatic  Negative GI/hepatic ROS   GERD well controlled,        Negative  ROS        Endo/Other  Negative endo/other ROS      GYN  Negative gynecology ROS          Hematology  Negative hematology ROS      Musculoskeletal  Negative musculoskeletal ROS   Arthritis     Neurology  Negative neurology ROS   Headaches,    Psychology   Negative psychology ROS Anxiety,              Physical Exam    Airway    Mallampati score: II  TM Distance: >3 FB  Neck ROM: full     Dental   No notable dental hx     Cardiovascular  Comment: Negative ROS, Rhythm: regular, Rate: normal, Cardiovascular exam normal    Pulmonary  Pulmonary exam normal Breath sounds clear to auscultation,     Other Findings        Anesthesia Plan  ASA Score- 2     Anesthesia Type- IV sedation with anesthesia with ASA Monitors  Additional Monitors:   Airway Plan:         Plan Factors-    Induction- intravenous  Postoperative Plan-     Informed Consent- Anesthetic plan and risks discussed with patient

## 2018-12-06 NOTE — OP NOTE
OPERATIVE REPORT  PATIENT NAME: Breanne Christopher    :  1965  MRN: [id number]  Pt Location: [facility] GI ROOM 01    SURGERY DATE: 2018    Surgeon(s) and Role:     * Markos Olsen DO - Primary    Preop Diagnosis:  Colon cancer screening [Z12 11]    Post-Op Diagnosis Codes:     * Colon cancer screening [Z12 11]    Procedure(s) (LRB):  ESOPHAGOGASTRODUODENOSCOPY (EGD) (N/A)  COLONOSCOPY (N/A)    Specimen(s):  ID Type Source Tests Collected by Time Destination   1 : normal duodenum Tissue Duodenum TISSUE EXAM Markos Olsen DO 2018  2:22 PM    2 : sigmoid mild colitis Tissue Colon TISSUE EXAM Markos Olsen DO 2018  2:58 PM        Estimated Blood Loss:   Minimal    Drains:       Anesthesia Type:   Choice    Operative Indications:  Colon cancer screening [Z12 11]  ESOPHAGOGASTRODUODENOSCOPY    PROCEDURE: EGD    SEDATION: Monitored anesthesia care, check anesthesia records    ASA Class: 2    INDICATIONS:  GERD    CONSENT:  Informed consent was obtained for the procedure, including sedation after explaining the risks and benefits of the procedure  Risks including but not limited to bleeding, perforation, infection, and missed lesion  PREPARATION:   Telemetry, pulse oximetry, blood pressure were monitored throughout the procedure  Patient was identified by myself both verbally and by visual inspection of ID band  DESCRIPTION:   Patient was placed in the left lateral decubitus position and was sedated with the above medication  The gastroscope was introduced in to the oropharynx and the esophagus was intubated under direct visualization  Scope was passed down the esophagus up to 2nd part of the duodenum  A careful inspection was made as the gastroscope was withdrawn, including a retroflexed view of the stomach; findings and interventions are described below  FINDINGS:    #1  Esophagus- normal esophagus    #2  Stomach- normal stomach on direct and retroflexed views    #3   Duodenum- normal 1st and 2nd part of the duodenum, biopsied with cold biopsy forceps         IMPRESSIONS:      Normal EGD  Biopsied from the duodenum  RECOMMENDATIONS:     Await biopsy results  COMPLICATIONS:  None; patient tolerated the procedure well  DISPOSITION: PACU           CONDITION: Stable    Colonoscopy Procedure Note    Procedure: Colonoscopy    Sedation: Monitored anesthesia care, check anesthesia records      ASA Class: 2    INDICATIONS:  Colon cancer screening    POST-OP DIAGNOSIS: See the impression below    Procedure Details     Prior colonoscopy: No prior colonoscopy  Informed consent was obtained for the procedure, including sedation  Risks of perforation, hemorrhage, adverse drug reaction and aspiration were discussed  The patient was placed in the left lateral decubitus position  Based on the pre-procedure assessment, including review of the patient's medical history, medications, allergies, and review of systems, she had been deemed to be an appropriate candidate for conscious sedation; she was therefore sedated with the medications listed below  The patient was monitored continuously with telemetry, pulse oximetry, blood pressure monitoring, and direct observations  A rectal examination was performed  The colonoscope was inserted into the rectum and advanced under direct vision to the cecum, which was identified by the ileocecal valve and appendiceal orifice  The quality of the colonic preparation was good  A careful inspection was made as the colonoscope was withdrawn, including a retroflexed view of the rectum; findings and interventions are described below  Findings: The colon was normal except for extremely tortuous  Multiple patient positions change in order to obtain views of the cecum  Complications: None; patient tolerated the procedure well  Impression:    The colon was normal except for extremely tortuous and mild non specific colitis in the sigmoid colon  Multiple patient positions change in order to obtain views of the cecum  Colon biopsies obtained from the nonspecific colitis  Recommendations:  Await biopsy results  Repeat colonoscopy in 10 years for screening purposes        Ilan Schreiber DO  DATE: December 6, 2018  TIME: 3:08 PM

## 2018-12-12 ENCOUNTER — TELEPHONE (OUTPATIENT)
Dept: GASTROENTEROLOGY | Facility: CLINIC | Age: 53
End: 2018-12-12

## 2018-12-24 DIAGNOSIS — M54.6 CHRONIC MIDLINE THORACIC BACK PAIN: Primary | ICD-10-CM

## 2018-12-24 DIAGNOSIS — G89.29 CHRONIC MIDLINE THORACIC BACK PAIN: Primary | ICD-10-CM

## 2018-12-26 ENCOUNTER — HOSPITAL ENCOUNTER (OUTPATIENT)
Dept: RADIOLOGY | Facility: HOSPITAL | Age: 53
Discharge: HOME | End: 2018-12-26
Payer: COMMERCIAL

## 2018-12-26 DIAGNOSIS — M54.6 CHRONIC MIDLINE THORACIC BACK PAIN: ICD-10-CM

## 2018-12-26 DIAGNOSIS — G89.29 CHRONIC MIDLINE THORACIC BACK PAIN: ICD-10-CM

## 2018-12-26 PROCEDURE — 72072 X-RAY EXAM THORAC SPINE 3VWS: CPT

## 2019-01-16 DIAGNOSIS — J01.00 ACUTE NON-RECURRENT MAXILLARY SINUSITIS: Primary | ICD-10-CM

## 2019-01-16 RX ORDER — SULFAMETHOXAZOLE AND TRIMETHOPRIM 800; 160 MG/1; MG/1
1 TABLET ORAL EVERY 12 HOURS SCHEDULED
Qty: 20 TABLET | Refills: 0 | Status: SHIPPED | OUTPATIENT
Start: 2019-01-16 | End: 2019-01-26

## 2019-01-17 NOTE — PROGRESS NOTES
Problem List Items Addressed This Visit        Respiratory    Acute non-recurrent sinusitis - Primary    Relevant Medications    sulfamethoxazole-trimethoprim (BACTRIM DS) 800-160 mg per tablet

## 2019-03-08 ENCOUNTER — ANNUAL EXAM (OUTPATIENT)
Dept: GYNECOLOGY | Facility: CLINIC | Age: 54
End: 2019-03-08
Payer: COMMERCIAL

## 2019-03-08 VITALS
SYSTOLIC BLOOD PRESSURE: 128 MMHG | HEIGHT: 65 IN | DIASTOLIC BLOOD PRESSURE: 76 MMHG | BODY MASS INDEX: 25.99 KG/M2 | WEIGHT: 156 LBS

## 2019-03-08 DIAGNOSIS — M25.562 CHRONIC PAIN OF LEFT KNEE: Primary | ICD-10-CM

## 2019-03-08 DIAGNOSIS — N95.2 ATROPHIC VAGINITIS: Primary | ICD-10-CM

## 2019-03-08 DIAGNOSIS — R10.2 PELVIC PAIN: ICD-10-CM

## 2019-03-08 DIAGNOSIS — Z01.419 ENCOUNTER FOR GYNECOLOGICAL EXAMINATION WITH PAPANICOLAOU SMEAR OF CERVIX: ICD-10-CM

## 2019-03-08 DIAGNOSIS — G89.29 CHRONIC PAIN OF LEFT KNEE: Primary | ICD-10-CM

## 2019-03-08 DIAGNOSIS — Z13.820 SCREENING FOR OSTEOPOROSIS: ICD-10-CM

## 2019-03-08 PROBLEM — M25.569 KNEE PAIN, CHRONIC: Status: ACTIVE | Noted: 2019-03-08

## 2019-03-08 PROCEDURE — G0145 SCR C/V CYTO,THINLAYER,RESCR: HCPCS | Performed by: OBSTETRICS & GYNECOLOGY

## 2019-03-08 PROCEDURE — 99396 PREV VISIT EST AGE 40-64: CPT | Performed by: OBSTETRICS & GYNECOLOGY

## 2019-03-08 PROCEDURE — 76977 US BONE DENSITY MEASURE: CPT | Performed by: OBSTETRICS & GYNECOLOGY

## 2019-03-08 RX ORDER — ESTRADIOL 10 UG/1
INSERT VAGINAL
Qty: 24 TABLET | Refills: 3 | Status: SHIPPED | OUTPATIENT
Start: 2019-03-08 | End: 2019-06-27 | Stop reason: SDUPTHER

## 2019-03-08 NOTE — ASSESSMENT & PLAN NOTE
Patient does have some chronic knee pain on the left  Has been quite significant recently  Has been bothering her with regard to sleep, as well as other movement  Would recommend x-ray of the knee, continue on Voltaren  Patient should stop using over-the-counter Aleve or other nonsteroidals

## 2019-03-08 NOTE — PROGRESS NOTES
Assessment/Plan:         Diagnoses and all orders for this visit:    Encounter for gynecological examination with Papanicolaou smear of cervix  -     Liquid-based pap, screening    Atrophic vaginitis;  start vagifem    Pelvic pain; RTO FOR TVS  Suspect myofascial pain    Screening for osteoporosis: -0 8    Other orders  -     TURMERIC PO; Take by mouth        Subjective:      Patient ID: Robert Selby is a 47 y o  female  HPI  Annual exam  C/O vaginal dryness and dyspareunia  Also is experiencing some sharp stabbing pain in the bilateral lower quadrants  This happens approximately 1-2 times per month  It lasts for only a few seconds  Nothing in particular seems to cause this to her  It can occur while she is sitting there driving  She has had bilateral hip replacements  She has no associated nausea, vomiting, diarrhea, constipation, fever, dysuria, hematuria, increased frequency of urination or urgency  She did have a colonoscopy in December of 2018 which according to patient was normal   She has not had any further bleeding since she had a polypectomy done in September of 2017    The following portions of the patient's history were reviewed and updated as appropriate: allergies, current medications, past family history, past medical history, past social history, past surgical history and problem list     Review of Systems   Constitutional: Negative  Gastrointestinal: Negative  Genitourinary: Negative  Objective:      /76 (BP Location: Right arm)   Ht 5' 4 5" (1 638 m)   Wt 70 8 kg (156 lb)   BMI 26 36 kg/m²          Physical Exam   Constitutional: She appears well-developed and well-nourished  Neck: Normal range of motion  Neck supple  No thyromegaly present  Cardiovascular: Normal rate, regular rhythm and normal heart sounds  Pulmonary/Chest: Effort normal and breath sounds normal  No respiratory distress   Right breast exhibits no inverted nipple, no mass, no nipple discharge, no skin change and no tenderness  Left breast exhibits no inverted nipple, no mass, no nipple discharge, no skin change and no tenderness  Abdominal: Soft  Bowel sounds are normal  She exhibits no distension and no mass  There is no tenderness  There is no rebound and no guarding  No hernia  Hernia confirmed negative in the right inguinal area and confirmed negative in the left inguinal area  Genitourinary: There is no rash or lesion on the right labia  There is no rash or lesion on the left labia  Uterus is not deviated, not enlarged, not fixed and not tender  Cervix exhibits no motion tenderness, no discharge and no friability  Right adnexum displays no mass, no tenderness and no fullness  Left adnexum displays no mass, no tenderness and no fullness  No bleeding in the vagina  No vaginal discharge found  Lymphadenopathy: No inguinal adenopathy noted on the right or left side

## 2019-03-12 RX ORDER — DICLOFENAC SODIUM 75 MG/1
75 TABLET, DELAYED RELEASE ORAL 2 TIMES DAILY PRN
Qty: 60 TABLET | Refills: 2 | Status: SHIPPED | OUTPATIENT
Start: 2019-03-12 | End: 2019-11-26 | Stop reason: ALTCHOICE

## 2019-03-13 ENCOUNTER — HOSPITAL ENCOUNTER (OUTPATIENT)
Dept: RADIOLOGY | Facility: HOSPITAL | Age: 54
Discharge: HOME | End: 2019-03-13
Payer: COMMERCIAL

## 2019-03-13 DIAGNOSIS — M25.562 CHRONIC PAIN OF LEFT KNEE: ICD-10-CM

## 2019-03-13 DIAGNOSIS — G89.29 CHRONIC PAIN OF LEFT KNEE: ICD-10-CM

## 2019-03-13 LAB
LAB AP GYN PRIMARY INTERPRETATION: NORMAL
Lab: NORMAL

## 2019-03-13 PROCEDURE — 73562 X-RAY EXAM OF KNEE 3: CPT

## 2019-03-20 ENCOUNTER — OFFICE VISIT (OUTPATIENT)
Dept: GYNECOLOGY | Facility: CLINIC | Age: 54
End: 2019-03-20
Payer: COMMERCIAL

## 2019-03-20 ENCOUNTER — ULTRASOUND (OUTPATIENT)
Dept: GYNECOLOGY | Facility: CLINIC | Age: 54
End: 2019-03-20
Payer: COMMERCIAL

## 2019-03-20 DIAGNOSIS — R10.2 PELVIC PAIN: Primary | ICD-10-CM

## 2019-03-20 PROCEDURE — 76830 TRANSVAGINAL US NON-OB: CPT | Performed by: OBSTETRICS & GYNECOLOGY

## 2019-03-20 PROCEDURE — 99212 OFFICE O/P EST SF 10 MIN: CPT | Performed by: OBSTETRICS & GYNECOLOGY

## 2019-03-20 NOTE — PROGRESS NOTES
See previous note  Patient presents to the office for transvaginal scan secondary to pelvic pain  AMB US Pelvic Non OB  Date/Time: 3/20/2019 3:26 PM  Performed by: Paresh Wallace  Authorized by: Tiny Chauhan DO      Procedure details:     Indications: non-obstetric abdominal pain      Technique:  Transvaginal US, Non-OB    Position: lithotomy exam    Uterine findings:     Diameter (mm):  27    Length (mm):  45    Width (mm):  35    Endometrial stripe: identified      Endometrium thickness (mm):  1 8  Left ovary findings:     Left ovary:  Visualized    Diameter (mm):  10 8    Length (mm):  17    Width (mm):  11 1  Right ovary findings:     Right ovary:  Visualized    Diameter (mm):  8 6    Length (mm):  21 4    Width (mm):  15 1  Other findings:     Free pelvic fluid: not identified      Free peritoneal fluid: not identified    Post-Procedure Details:     Impression:  Retroverted uterus and bilateral ovaries appear within normal limits  No free fluid  Tolerance: Tolerated well, no immediate complications    Complications: no complications      Results reviewed with patient  I suspect this is all myofascial pain related to her bilateral hip replacement    She will follow up with her orthopedic surgeon

## 2019-03-20 NOTE — PROGRESS NOTES
AMB US Pelvic Non OB  Date/Time: 3/20/2019 3:26 PM  Performed by: Alena Lai  Authorized by: Edison Fitzpatrick DO     Procedure details:     Indications: non-obstetric abdominal pain      Technique:  Transvaginal US, Non-OB    Position: lithotomy exam    Uterine findings:     Diameter (mm):  27    Length (mm):  45    Width (mm):  35    Endometrial stripe: identified      Endometrium thickness (mm):  1 8  Left ovary findings:     Left ovary:  Visualized    Diameter (mm):  10 8    Length (mm):  17    Width (mm):  11 1  Right ovary findings:     Right ovary:  Visualized    Diameter (mm):  8 6    Length (mm):  21 4    Width (mm):  15 1  Other findings:     Free pelvic fluid: not identified      Free peritoneal fluid: not identified    Post-Procedure Details:     Impression:  Retroverted uterus and bilateral ovaries appear within normal limits  No free fluid  Tolerance: Tolerated well, no immediate complications    Complications: no complications    Additional Procedure Comments:       GE Logiq P5 transvaginal transducer E8C with Serial Number [id number] was used during procedure and subsequently cleaned with high level disinfection utilizing the [email or web address]

## 2019-04-17 DIAGNOSIS — K58.9 IRRITABLE BOWEL SYNDROME, UNSPECIFIED TYPE: ICD-10-CM

## 2019-04-17 RX ORDER — DICYCLOMINE HCL 20 MG
20 TABLET ORAL EVERY 6 HOURS
Qty: 60 TABLET | Refills: 2 | Status: SHIPPED | OUTPATIENT
Start: 2019-04-17 | End: 2021-02-12

## 2019-05-13 DIAGNOSIS — M54.6 CHRONIC MIDLINE THORACIC BACK PAIN: Primary | ICD-10-CM

## 2019-05-13 DIAGNOSIS — G89.29 CHRONIC MIDLINE THORACIC BACK PAIN: Primary | ICD-10-CM

## 2019-05-14 RX ORDER — TRAMADOL HYDROCHLORIDE 50 MG/1
50 TABLET ORAL EVERY 6 HOURS PRN
Qty: 30 TABLET | Refills: 0 | OUTPATIENT
Start: 2019-05-14

## 2019-05-14 RX ORDER — TRAMADOL HYDROCHLORIDE 50 MG/1
TABLET ORAL
Qty: 60 TABLET | Refills: 1 | Status: SHIPPED | OUTPATIENT
Start: 2019-05-14 | End: 2019-12-10 | Stop reason: SDUPTHER

## 2019-05-23 ENCOUNTER — OFFICE VISIT (OUTPATIENT)
Dept: FAMILY MEDICINE CLINIC | Facility: CLINIC | Age: 54
End: 2019-05-23
Payer: COMMERCIAL

## 2019-05-23 VITALS
DIASTOLIC BLOOD PRESSURE: 64 MMHG | SYSTOLIC BLOOD PRESSURE: 102 MMHG | WEIGHT: 144 LBS | BODY MASS INDEX: 23.99 KG/M2 | HEART RATE: 56 BPM | HEIGHT: 65 IN

## 2019-05-23 DIAGNOSIS — F51.01 PRIMARY INSOMNIA: Primary | ICD-10-CM

## 2019-05-23 DIAGNOSIS — M76.32 ILIOTIBIAL BAND SYNDROME OF LEFT SIDE: ICD-10-CM

## 2019-05-23 DIAGNOSIS — M75.22 BICEPS TENDINITIS OF LEFT UPPER EXTREMITY: ICD-10-CM

## 2019-05-23 DIAGNOSIS — M19.012 ARTHRITIS OF LEFT ACROMIOCLAVICULAR JOINT: ICD-10-CM

## 2019-05-23 PROBLEM — M76.30 IT BAND SYNDROME: Status: ACTIVE | Noted: 2019-05-23

## 2019-05-23 PROBLEM — G47.00 INSOMNIA: Status: ACTIVE | Noted: 2019-05-23

## 2019-05-23 PROBLEM — M75.20 BICEPS TENDINITIS: Status: ACTIVE | Noted: 2019-05-23

## 2019-05-23 PROBLEM — M19.019 ACROMIOCLAVICULAR JOINT ARTHRITIS: Status: ACTIVE | Noted: 2019-05-23

## 2019-05-23 PROCEDURE — 3008F BODY MASS INDEX DOCD: CPT | Performed by: FAMILY MEDICINE

## 2019-05-23 PROCEDURE — 99213 OFFICE O/P EST LOW 20 MIN: CPT | Performed by: FAMILY MEDICINE

## 2019-05-24 ENCOUNTER — TELEPHONE (OUTPATIENT)
Dept: FAMILY MEDICINE CLINIC | Facility: CLINIC | Age: 54
End: 2019-05-24

## 2019-05-29 DIAGNOSIS — F51.01 PRIMARY INSOMNIA: ICD-10-CM

## 2019-06-07 ENCOUNTER — EVALUATION (OUTPATIENT)
Dept: PHYSICAL THERAPY | Facility: CLINIC | Age: 54
End: 2019-06-07
Payer: COMMERCIAL

## 2019-06-07 DIAGNOSIS — G89.29 CHRONIC PAIN OF LEFT KNEE: ICD-10-CM

## 2019-06-07 DIAGNOSIS — M75.22 BICEPS TENDINITIS OF LEFT UPPER EXTREMITY: Primary | ICD-10-CM

## 2019-06-07 DIAGNOSIS — M19.012 ARTHRITIS OF LEFT ACROMIOCLAVICULAR JOINT: ICD-10-CM

## 2019-06-07 DIAGNOSIS — M25.562 CHRONIC PAIN OF LEFT KNEE: ICD-10-CM

## 2019-06-07 DIAGNOSIS — M76.32 ILIOTIBIAL BAND SYNDROME OF LEFT SIDE: ICD-10-CM

## 2019-06-07 PROCEDURE — 97162 PT EVAL MOD COMPLEX 30 MIN: CPT | Performed by: PHYSICAL THERAPIST

## 2019-06-07 PROCEDURE — 97110 THERAPEUTIC EXERCISES: CPT | Performed by: PHYSICAL THERAPIST

## 2019-06-11 ENCOUNTER — OFFICE VISIT (OUTPATIENT)
Dept: PHYSICAL THERAPY | Facility: CLINIC | Age: 54
End: 2019-06-11
Payer: COMMERCIAL

## 2019-06-11 DIAGNOSIS — M75.22 BICEPS TENDINITIS OF LEFT UPPER EXTREMITY: Primary | ICD-10-CM

## 2019-06-11 DIAGNOSIS — G89.29 CHRONIC PAIN OF LEFT KNEE: ICD-10-CM

## 2019-06-11 DIAGNOSIS — M76.32 ILIOTIBIAL BAND SYNDROME OF LEFT SIDE: ICD-10-CM

## 2019-06-11 DIAGNOSIS — M19.012 ARTHRITIS OF LEFT ACROMIOCLAVICULAR JOINT: ICD-10-CM

## 2019-06-11 DIAGNOSIS — M25.562 CHRONIC PAIN OF LEFT KNEE: ICD-10-CM

## 2019-06-11 PROCEDURE — 97110 THERAPEUTIC EXERCISES: CPT | Performed by: PHYSICAL THERAPIST

## 2019-06-11 PROCEDURE — 97140 MANUAL THERAPY 1/> REGIONS: CPT | Performed by: PHYSICAL THERAPIST

## 2019-06-14 ENCOUNTER — OFFICE VISIT (OUTPATIENT)
Dept: PHYSICAL THERAPY | Facility: CLINIC | Age: 54
End: 2019-06-14
Payer: COMMERCIAL

## 2019-06-14 DIAGNOSIS — M25.562 CHRONIC PAIN OF LEFT KNEE: ICD-10-CM

## 2019-06-14 DIAGNOSIS — G89.29 CHRONIC PAIN OF LEFT KNEE: ICD-10-CM

## 2019-06-14 DIAGNOSIS — M19.012 ARTHRITIS OF LEFT ACROMIOCLAVICULAR JOINT: ICD-10-CM

## 2019-06-14 DIAGNOSIS — M76.32 ILIOTIBIAL BAND SYNDROME OF LEFT SIDE: ICD-10-CM

## 2019-06-14 DIAGNOSIS — M75.22 BICEPS TENDINITIS OF LEFT UPPER EXTREMITY: Primary | ICD-10-CM

## 2019-06-14 PROCEDURE — 97112 NEUROMUSCULAR REEDUCATION: CPT

## 2019-06-14 PROCEDURE — 97110 THERAPEUTIC EXERCISES: CPT

## 2019-06-14 PROCEDURE — 97140 MANUAL THERAPY 1/> REGIONS: CPT

## 2019-06-18 ENCOUNTER — APPOINTMENT (OUTPATIENT)
Dept: PHYSICAL THERAPY | Facility: CLINIC | Age: 54
End: 2019-06-18
Payer: COMMERCIAL

## 2019-06-19 ENCOUNTER — OFFICE VISIT (OUTPATIENT)
Dept: PHYSICAL THERAPY | Facility: CLINIC | Age: 54
End: 2019-06-19
Payer: COMMERCIAL

## 2019-06-19 DIAGNOSIS — G89.29 CHRONIC PAIN OF LEFT KNEE: ICD-10-CM

## 2019-06-19 DIAGNOSIS — M25.562 CHRONIC PAIN OF LEFT KNEE: ICD-10-CM

## 2019-06-19 DIAGNOSIS — M19.012 ARTHRITIS OF LEFT ACROMIOCLAVICULAR JOINT: ICD-10-CM

## 2019-06-19 DIAGNOSIS — M75.22 BICEPS TENDINITIS OF LEFT UPPER EXTREMITY: Primary | ICD-10-CM

## 2019-06-19 DIAGNOSIS — M76.32 ILIOTIBIAL BAND SYNDROME OF LEFT SIDE: ICD-10-CM

## 2019-06-19 PROCEDURE — 97112 NEUROMUSCULAR REEDUCATION: CPT

## 2019-06-19 PROCEDURE — 97110 THERAPEUTIC EXERCISES: CPT

## 2019-06-19 PROCEDURE — 97140 MANUAL THERAPY 1/> REGIONS: CPT

## 2019-06-20 ENCOUNTER — TELEPHONE (OUTPATIENT)
Dept: FAMILY MEDICINE CLINIC | Facility: CLINIC | Age: 54
End: 2019-06-20

## 2019-06-20 DIAGNOSIS — F51.01 PRIMARY INSOMNIA: Primary | ICD-10-CM

## 2019-06-21 ENCOUNTER — APPOINTMENT (OUTPATIENT)
Dept: PHYSICAL THERAPY | Facility: CLINIC | Age: 54
End: 2019-06-21
Payer: COMMERCIAL

## 2019-06-25 ENCOUNTER — OFFICE VISIT (OUTPATIENT)
Dept: PHYSICAL THERAPY | Facility: CLINIC | Age: 54
End: 2019-06-25
Payer: COMMERCIAL

## 2019-06-25 DIAGNOSIS — G89.29 CHRONIC PAIN OF LEFT KNEE: ICD-10-CM

## 2019-06-25 DIAGNOSIS — M19.012 ARTHRITIS OF LEFT ACROMIOCLAVICULAR JOINT: ICD-10-CM

## 2019-06-25 DIAGNOSIS — M75.22 BICEPS TENDINITIS OF LEFT UPPER EXTREMITY: Primary | ICD-10-CM

## 2019-06-25 DIAGNOSIS — M76.32 ILIOTIBIAL BAND SYNDROME OF LEFT SIDE: ICD-10-CM

## 2019-06-25 DIAGNOSIS — M25.562 CHRONIC PAIN OF LEFT KNEE: ICD-10-CM

## 2019-06-25 PROCEDURE — 97110 THERAPEUTIC EXERCISES: CPT | Performed by: PHYSICAL THERAPIST

## 2019-06-25 PROCEDURE — 97140 MANUAL THERAPY 1/> REGIONS: CPT | Performed by: PHYSICAL THERAPIST

## 2019-06-27 DIAGNOSIS — N95.2 ATROPHIC VAGINITIS: ICD-10-CM

## 2019-06-27 RX ORDER — ESTRADIOL 10 UG/1
INSERT VAGINAL
Qty: 40 TABLET | Refills: 3 | Status: SHIPPED | OUTPATIENT
Start: 2019-06-27 | End: 2021-04-27 | Stop reason: SDUPTHER

## 2019-06-28 ENCOUNTER — APPOINTMENT (OUTPATIENT)
Dept: PHYSICAL THERAPY | Facility: CLINIC | Age: 54
End: 2019-06-28
Payer: COMMERCIAL

## 2019-07-02 ENCOUNTER — APPOINTMENT (OUTPATIENT)
Dept: PHYSICAL THERAPY | Facility: CLINIC | Age: 54
End: 2019-07-02
Payer: COMMERCIAL

## 2019-07-03 ENCOUNTER — OFFICE VISIT (OUTPATIENT)
Dept: PHYSICAL THERAPY | Facility: CLINIC | Age: 54
End: 2019-07-03
Payer: COMMERCIAL

## 2019-07-03 DIAGNOSIS — M75.22 BICEPS TENDINITIS OF LEFT UPPER EXTREMITY: Primary | ICD-10-CM

## 2019-07-03 DIAGNOSIS — M25.562 CHRONIC PAIN OF LEFT KNEE: ICD-10-CM

## 2019-07-03 DIAGNOSIS — G89.29 CHRONIC PAIN OF LEFT KNEE: ICD-10-CM

## 2019-07-03 DIAGNOSIS — M19.012 ARTHRITIS OF LEFT ACROMIOCLAVICULAR JOINT: ICD-10-CM

## 2019-07-03 DIAGNOSIS — M76.32 ILIOTIBIAL BAND SYNDROME OF LEFT SIDE: ICD-10-CM

## 2019-07-03 PROCEDURE — 97112 NEUROMUSCULAR REEDUCATION: CPT | Performed by: PHYSICAL THERAPIST

## 2019-07-03 PROCEDURE — 97140 MANUAL THERAPY 1/> REGIONS: CPT | Performed by: PHYSICAL THERAPIST

## 2019-07-03 PROCEDURE — 97110 THERAPEUTIC EXERCISES: CPT | Performed by: PHYSICAL THERAPIST

## 2019-07-03 NOTE — PROGRESS NOTES
Daily Note     Today's date: 7/3/2019  Patient name: Radha Carmichael  : 1965  MRN: [id number]  Referring provider: Esa Lou MD  Dx:   Encounter Diagnosis     ICD-10-CM    1  Biceps tendinitis of left upper extremity M75 22    2  Chronic pain of left knee M25 562     G89 29    3  Iliotibial band syndrome of left side M76 32    4  Arthritis of left acromioclavicular joint M19 012                   Subjective:  Reports knee is feeling well, continuing to improve as a whole, feels even better with tape, shoulder is mediocre  Objective: See treatment diary below      Assessment: Reviewed taping technique, minor cues, RE n v      Plan: Continue per PT POC, consider teaching pt how to tape her L knee         Precautions: Hx of CS and LS pain, L  GEOVANY, and  R GEOVANY, falls hx  Daily Treatment Diary       Manual 6/7 6/11 6/14 6/19 6/25 7/3       Inferior AP glide grade 2-4  10 TS TS 8 10 min       Kenoneermias Taping medial trial   nv performed LK LK performed performed                                              Exercise Diary             Bike  6 min 6'  6' 6 min 6 min       Scaption 2 x 10 2 x 10 2x10  2x10 1# 2 x 10 2# 2 x 10       Isometric Flex / Abd 6" x 10 6" x 10   HEP        TA draw  10" x10 10" x 10 10"x10 10"x10 HEP        TA draw Bridges 2 x 10 2 x 10 2x10  2x10 3 x 10 3 x 10       Tband row + ext  RTB nv RTB  2x10 RTB  2x10 RTB 3 x 10 RTB 3 x10       Tband ER + IR  RTB nv RTB  2x10 RTB  2x10 RTB 3 x 10 RTB 3 x 10       SLS B  20" x 4 B foam 10" x4  foam 10"x4 foam 10" x 4  foams 10" x 4       3 way Hip B  2 x 10 3x10 YTB  2x10 RTB 2 x 10 RTB 2 x 10       Tband Side step     nv RTB 4 laps       Squat review*  10, 10s/p tape 10x 2x10 3 x 10 3 x 10                                                                                                                                         Modalities             CP to Shoulder/Knee prn

## 2019-07-05 ENCOUNTER — APPOINTMENT (OUTPATIENT)
Dept: PHYSICAL THERAPY | Facility: CLINIC | Age: 54
End: 2019-07-05
Payer: COMMERCIAL

## 2019-07-10 ENCOUNTER — EVALUATION (OUTPATIENT)
Dept: PHYSICAL THERAPY | Facility: CLINIC | Age: 54
End: 2019-07-10
Payer: COMMERCIAL

## 2019-07-10 DIAGNOSIS — M19.012 ARTHRITIS OF LEFT ACROMIOCLAVICULAR JOINT: ICD-10-CM

## 2019-07-10 DIAGNOSIS — G89.29 CHRONIC PAIN OF LEFT KNEE: ICD-10-CM

## 2019-07-10 DIAGNOSIS — M75.22 BICEPS TENDINITIS OF LEFT UPPER EXTREMITY: Primary | ICD-10-CM

## 2019-07-10 DIAGNOSIS — M76.32 ILIOTIBIAL BAND SYNDROME OF LEFT SIDE: ICD-10-CM

## 2019-07-10 DIAGNOSIS — M25.562 CHRONIC PAIN OF LEFT KNEE: ICD-10-CM

## 2019-07-10 PROCEDURE — 97110 THERAPEUTIC EXERCISES: CPT | Performed by: PHYSICAL THERAPIST

## 2019-07-10 PROCEDURE — 97140 MANUAL THERAPY 1/> REGIONS: CPT | Performed by: PHYSICAL THERAPIST

## 2019-07-10 NOTE — PROGRESS NOTES
PT Evaluation     Today's date: 7/10/2019  Patient name: Freya Brown  : 1965  MRN: [id number]  Referring provider: João Cordoba MD  Dx:   Encounter Diagnosis     ICD-10-CM    1  Biceps tendinitis of left upper extremity M75 22    2  Chronic pain of left knee M25 562     G89 29    3  Iliotibial band syndrome of left side M76 32    4  Arthritis of left acromioclavicular joint M19 012                   Assessment  Assessment details: Pt is progressing quite well at this time  They have demonstrated improvement improvement in range strength range and flexibility  Pain levels are still at current however overall ability to perform tasks symptom have improved  They have achieved all STGs sought out for them as well as by them  They will benefit continued Skilled PT intervention in order to achieve all LTGs sought out for them as well as by them in order to perform all desired activities with minimal to nil symptom exacerbation  However given her current schedule she would like to place PT on hold and primary therapist is in accord  Thank you very much for this kind and motivated referral         Impairments: abnormal or restricted ROM, activity intolerance, impaired physical strength, pain with function and poor posture   Understanding of Dx/Px/POC: good   Prognosis: good    Goals  STG 4 Weeks:  Decrease pain at worst to 5 -not met  Improve range to by 5 from IE shoulder -met  Improve strength to gross 4- hip and shoulder -met  Independent with HEP -met  LTG 8 Weeks:  Decrease pain at worst to 3 -not met  Improve range to equal that of contra -met  Improve strength to 4/5 or greater -partial  Able to perform all desired activities with minimal to nil symptom exacerbation      Plan  Plan details: Plan is for DC to HEP, if there is a decline welcome to return    Patient would benefit from: skilled physical therapy  Planned modality interventions: cryotherapy, TENS and thermotherapy: hydrocollator packs  Planned therapy interventions: manual therapy, joint mobilization, patient education, postural training, strengthening, stretching, therapeutic activities, therapeutic exercise, therapeutic training, home exercise program, graded motor, graded exercise, graded activity, gait training, functional ROM exercises, flexibility and abdominal trunk stabilization  Duration in weeks: 6  Treatment plan discussed with: patient        Subjective Evaluation    History of Present Illness  Date of onset: 2019  Mechanism of injury: Pt presents today stating that the knee has been better with tape application  Pt reports knee pain at worst has been a 6/10  Pt reports that sometimes she feels the knee is improving and then some times it is not  Pt reports that she has considered going to orthopedic in regards of this  Shoulder she reports that at worst has been pretty tough during yard work and sleeping when she rises, but is managing  She is also considering going to ortho for this as well  Pt reports that she would like to be DC to HEP as there is a lot going on as of now in regards of work and family life and attending PT is challenging      Quality of life: good    Pain  Current pain ratin  At best pain ratin  At worst pain ratin  Quality: dull ache and sharp  Relieving factors: ice and medications  Aggravating factors: overhead activity and lifting (Knee change of position)  Progression: worsening      Diagnostic Tests  No diagnostic tests performed  Treatments  Previous treatment: medication  Patient Goals  Patient goals for therapy: decreased pain, increased motion, increased strength and return to sport/leisure activities          Objective     Active Range of Motion   Left Shoulder   Flexion: 160 (PROM 155) degrees with pain  Abduction: 155 (PROM 150) degrees with pain  External rotation BTH: C7 (PROM 80) with pain  Internal rotation BTB: T12 (PROM 70)     Right Shoulder   Flexion: 160 degrees Abduction: 155 degrees   External rotation BTH: C7   Internal rotation BTB: T8   Left Knee   Hyperextension  Flexion: 140 degrees   Extension: 5 degrees     Right Knee   Hyperextension   Flexion: 140 degrees   Extension: 5 degrees     Additional Active Range of Motion Details  Forward head, rounded shoulders  B/L Sensation intact to light touch C3,4,5,6,7,8,T1,T2  UE Screen R 5/5 strong shoulder all planes and painless L screen strong and painless, shoulder 4/5  Postural correction shoulder better   L 50# R 60#  Repeated motion   Flex neck pain no shoulder (sustained),   Retraction pain in shoulder, pain in shoulder   Ext single hold L shoulder pain   SB R neck (sustained)  SB L neck and shoulder (sustained)  Palpation pain along L AC as well as LHBT  STs Distraction + just in neck, Sharp Chad (-), Alar Lig intact,  L shoulder + full, (-) empty, + Hornblower ab Abd, not at add, (-) Anterior slide, (-) Crank, + ZOI, (-) Bicep Load, (+) Speeds  DNF  20"    Sensation intact to light touch L3,4,5,S1,S2  Mild Genu Valgum   Hip Strength  L Flex 4- Ext 5 Abd 4 Add 4  R Flex 4- Ext 5 Abd 4 Add 4  LE screen strong and painless   Palpation: pain along lateral aspect of patella  Joint Mobility L knee 3 gross, pat 4, R knee 4, pat 5  STs: (-) Stephanie, (-) Meghan, Ant/Post Draw (-), Luz Maria (-), Scour (-), + Patellar Scour      TA draw 20"           Precautions: Hx of CS and LS pain, L 2016 GEOVANY, and 2011 R GEOVANY, falls hx  Daily Treatment Diary       Manual 6/7 6/11 6/14 6/19 6/25 7/3 7/10      Inferior AP glide grade 2-4  10 TS TS 8 10 min       [facility]   nv performed LK LK performed performed review             15                                Exercise Diary             Bike  6 min 6'  6' 6 min 6 min       Scaption 2 x 10 2 x 10 2x10  2x10 1# 2 x 10 2# 2 x 10       Isometric Flex / Abd 6" x 10 6" x 10   HEP        TA draw  10" x10 10" x 10 10"x10 10"x10 HEP        TA draw Bridges 2 x 10 2 x 10 2x10  2x10 3 x 10 3 x 10       Tband row + ext  RTB nv RTB  2x10 RTB  2x10 RTB 3 x 10 RTB 3 x10 review      Tband ER + IR  RTB nv RTB  2x10 RTB  2x10 RTB 3 x 10 RTB 3 x 10 review      SLS B  20" x 4 B foam 10" x4  foam 10"x4 foam 10" x 4  foams 10" x 4       3 way Hip B  2 x 10 3x10 YTB  2x10 RTB 2 x 10 RTB 2 x 10       Tband Side step     nv RTB 4 laps       Squat review*  10, 10s/p tape 10x 2x10 3 x 10 3 x 10                                                                                                                                         Modalities             CP to Shoulder/Knee prn

## 2019-07-15 DIAGNOSIS — E78.2 MIXED HYPERLIPIDEMIA: Primary | ICD-10-CM

## 2019-07-15 DIAGNOSIS — N95.0 POSTMENOPAUSAL BLEEDING: ICD-10-CM

## 2019-07-15 DIAGNOSIS — Z78.0 MENOPAUSE: ICD-10-CM

## 2019-07-16 ENCOUNTER — OFFICE VISIT (OUTPATIENT)
Dept: FAMILY MEDICINE CLINIC | Facility: CLINIC | Age: 54
End: 2019-07-16
Payer: COMMERCIAL

## 2019-07-16 ENCOUNTER — APPOINTMENT (OUTPATIENT)
Dept: PHYSICAL THERAPY | Facility: CLINIC | Age: 54
End: 2019-07-16
Payer: COMMERCIAL

## 2019-07-16 VITALS
WEIGHT: 140 LBS | HEIGHT: 65 IN | BODY MASS INDEX: 23.32 KG/M2 | SYSTOLIC BLOOD PRESSURE: 104 MMHG | DIASTOLIC BLOOD PRESSURE: 56 MMHG | HEART RATE: 50 BPM

## 2019-07-16 DIAGNOSIS — T63.441A BEE STING REACTION, ACCIDENTAL OR UNINTENTIONAL, INITIAL ENCOUNTER: Primary | ICD-10-CM

## 2019-07-16 DIAGNOSIS — L03.113 CELLULITIS OF RIGHT UPPER EXTREMITY: ICD-10-CM

## 2019-07-16 PROBLEM — L03.90 CELLULITIS: Status: ACTIVE | Noted: 2019-07-16

## 2019-07-16 PROCEDURE — 99213 OFFICE O/P EST LOW 20 MIN: CPT | Performed by: FAMILY MEDICINE

## 2019-07-16 RX ORDER — CEFADROXIL 1000 MG/1
1 TABLET ORAL DAILY
Qty: 10 TABLET | Refills: 0 | Status: SHIPPED | OUTPATIENT
Start: 2019-07-16 | End: 2019-07-16 | Stop reason: SDUPTHER

## 2019-07-16 RX ORDER — PREDNISONE 10 MG/1
TABLET ORAL
Qty: 50 TABLET | Refills: 0 | Status: SHIPPED | OUTPATIENT
Start: 2019-07-16 | End: 2019-07-25

## 2019-07-16 RX ORDER — CEFADROXIL 1000 MG/1
1 TABLET ORAL DAILY
Qty: 10 TABLET | Refills: 0 | Status: SHIPPED | OUTPATIENT
Start: 2019-07-16 | End: 2019-07-25

## 2019-07-16 RX ORDER — PREDNISONE 10 MG/1
TABLET ORAL
Qty: 50 TABLET | Refills: 0 | Status: SHIPPED | OUTPATIENT
Start: 2019-07-16 | End: 2019-07-16 | Stop reason: SDUPTHER

## 2019-07-16 NOTE — PROGRESS NOTES
Assessment and Plan:    Problem List Items Addressed This Visit     Bee sting reaction - Primary     Significant increase in swelling and irritation  Given that she is having now swelling into her fingers, will start prednisone taper  Unfortunately, she has been using over-the-counter hydrocortisone, it is not really made a difference along with antihistamines  Because of this, steroids today  We did review problems with steroids, but there is really no other option for her  Relevant Medications    predniSONE 10 mg tablet    Cellulitis     Patient appears to have some increased redness and swelling along with warmth of the right hand  This could just be the allergic reaction, but it does appear to be getting worse  There is some increased swelling in the wrist as well as up into the forearm  Given this, recommend Duricef antibiotic  She will get that today from pharmacy  Relevant Medications    cefadroxil (DURICEF) 1 g tablet                 Diagnoses and all orders for this visit:    Bee sting reaction, accidental or unintentional, initial encounter  -     Discontinue: predniSONE 10 mg tablet; Take 3 BID for 3 days then 2 BID for 3 days then 1 BID for 3 days then 1 QD for 3 days then 1/2 QD for 3 days then stop  -     predniSONE 10 mg tablet; Take 3 BID for 3 days then 2 BID for 3 days then 1 BID for 3 days then 1 QD for 3 days then 1/2 QD for 3 days then stop    Cellulitis of right upper extremity  -     Discontinue: cefadroxil (DURICEF) 1 g tablet; Take 1 tablet (1 g total) by mouth daily for 10 days  -     cefadroxil (DURICEF) 1 g tablet; Take 1 tablet (1 g total) by mouth daily for 10 days              Subjective:      Patient ID: Gemma Cornell is a 47 y o  female  CC:    Chief Complaint   Patient presents with    Insect Bite       HPI:    Patient was recently starting on her hand by a bee  She was pulling IV out of her yd, the bee stung her hand    Since then, she was using Benadryl, both orally and topically  Claritin, ice, anything to try and decrease the irritation  The swelling seems to have gotten worse over the last 24 hours  This is despite the above mentioned interventions  She wondered whether not this would get worse  She does wear rings, and 1 on her right hand 4th finger, but she cannot remove that ring currently  She is feeling that the swelling is going up into her hand and wrist       The following portions of the patient's history were reviewed and updated as appropriate: allergies, current medications, past family history, past medical history, past social history, past surgical history and problem list       Review of Systems   Constitutional: Negative  HENT: Negative  Respiratory: Negative  Skin: Positive for rash  Data to review:       Objective:    Vitals:    07/16/19 1119   BP: 104/56   BP Location: Left arm   Patient Position: Sitting   Cuff Size: Standard   Pulse: (!) 50   Weight: 63 5 kg (140 lb)   Height: 5' 4 5" (1 638 m)        Physical Exam   Constitutional: She appears well-developed and well-nourished  Cardiovascular: Regular rhythm and normal heart sounds  Pulmonary/Chest: Effort normal and breath sounds normal  No stridor  No respiratory distress  She has no wheezes  She has no rales  Skin:        Nursing note and vitals reviewed

## 2019-07-16 NOTE — PATIENT INSTRUCTIONS
Problem List Items Addressed This Visit     Bee sting reaction - Primary     Significant increase in swelling and irritation  Given that she is having now swelling into her fingers, will start prednisone taper  Unfortunately, she has been using over-the-counter hydrocortisone, it is not really made a difference along with antihistamines  Because of this, steroids today  We did review problems with steroids, but there is really no other option for her  Relevant Medications    predniSONE 10 mg tablet    Cellulitis     Patient appears to have some increased redness and swelling along with warmth of the right hand  This could just be the allergic reaction, but it does appear to be getting worse  There is some increased swelling in the wrist as well as up into the forearm  Given this, recommend Duricef antibiotic  She will get that today from pharmacy  Relevant Medications    cefadroxil (DURICEF) 1 g tablet        Insect Bite or Sting   AMBULATORY CARE:   Most insect bites and stings  are not dangerous and go away without treatment  Common examples of insects that bite or sting are bees, ticks, mosquitoes, spiders, and ants  Insect bites or stings can lead to diseases such as malaria, West Nile virus, Lyme disease, or [place] Spotted Fever  Common signs and symptoms:   · Mild symptoms  include a red bump, pain, swelling, and itching  · Anaphylaxis symptoms  include throat tightness, trouble breathing, tingling, dizziness, and wheezing  Anaphylaxis is a sudden, life-threatening reaction that needs immediate treatment  Call 911 for signs or symptoms of anaphylaxis,  such as trouble breathing, swelling in your mouth or throat, or wheezing  You may also have itching, a rash, hives, or feel like you are going to faint  Seek care immediately if:   · You are stung on your tongue or in your throat  · A white area forms around the bite      · You are sweating badly or have body pain     · You think you were bitten or stung by a poisonous insect  Contact your healthcare provider if:   · You have a fever  · The area becomes red, warm, tender, and swollen beyond the area of the bite or sting  · You have questions or concerns about your condition or care  Steps to take for signs or symptoms of anaphylaxis:   · Immediately  give 1 shot of epinephrine only into the outer thigh muscle  · Leave the shot in place  as directed  Your healthcare provider may recommend you leave it in place for up to 10 seconds before you remove it  This helps make sure all of the epinephrine is delivered  · Call 911 and go to the emergency department,  even if the shot improved symptoms  Do not drive yourself  Bring the used epinephrine shot with you  Treatment  depends on how severe your symptoms are and if you had anaphylaxis before  You may need any of the following:  · Antihistamines  decrease mild symptoms such as itching and rash  · Epinephrine  is medicine used to treat severe allergic reactions such as anaphylaxis  If an insect bites or stings you:   · Remove the stinger  Scrape the stinger out with your fingernail, edge of a credit card, or a knife blade  Do not squeeze the wound  Gently wash the area with soap and water  · Remove the tick  Ticks must be removed as soon as possible so you do not get diseases passed through tick bites  Ask your healthcare provider for more information on tick bites and how to remove ticks  Care for your bite or sting wound:   · Elevate the affected area  Prop the wound above the level of your heart, if possible  Elevate the area for 10 to 20 minutes each hour or as directed by your healthcare provider  · Apply compresses  Soak a clean washcloth in cold water, wring it out, and put it on the bite or sting  Use the compress for 10 to 20 minutes each hour or as directed by your healthcare provider  After 24 to 48 hours, change to warm compresses  · Apply a baking soda paste  Add water to baking soda to make a thick paste  Put the paste on the area for 5 minutes  Rinse gently to remove the paste  Safety precautions to take if you are at risk for anaphylaxis:   · Keep 2 shots of epinephrine with you at all times  You may need a second shot, because epinephrine only works for about 20 minutes and symptoms may return  Your healthcare provider can show you and family members how to give the shot  Check the expiration date every month and replace it before it expires  · Create an action plan  Your healthcare provider can help you create a written plan that explains the allergy and an emergency plan to treat a reaction  The plan explains when to give a second epinephrine shot if symptoms return or do not improve after the first  Give copies of the action plan and emergency instructions to family members, work and school staff, and  providers  Show them how to give a shot of epinephrine  · Carry medical alert identification  Wear medical alert jewelry or carry a card that says you have an insect allergy  Ask your healthcare provider where to get these items  Prevent an insect bite or sting:   · Do not wear bright-colored or flower-print clothing when you plan to spend time outdoors  Do not use hairspray, perfumes, or aftershave  · Do not leave food out  · Empty any standing water  Wash containers with soap and water every 2 days  · Put screens on all open windows and doors  · Put insect repellent that contains DEET on skin that is showing when you go outside  Put insect repellent at the top of your boots, bottom of pant legs, and sleeve cuffs  Wear long sleeves, pants, and shoes  · Use citronella candles outdoors to help keep mosquitoes away  Put a tick and flea collar on pets  Follow up with your healthcare provider as directed:  Write down your questions so you remember to ask them during your visits    © 2017 [employer] [address] [place] [address] is for End User's use only and may not be sold, redistributed or otherwise used for commercial purposes  All illustrations and images included in CareNotes® are the copyrighted property of A D A M [employer] Inc  or Cornell Larson  The above information is an  only  It is not intended as medical advice for individual conditions or treatments  Talk to your doctor, nurse or pharmacist before following any medical regimen to see if it is safe and effective for you  Cellulitis   AMBULATORY CARE:   Cellulitis  is a skin infection caused by bacteria  Cellulitis usually appears on the legs and feet, arms and hands, or face  Common signs and symptoms include the following:   · A red, warm, swollen area on your skin    · Pain when the area is touched    · Bumps or blisters (abscess) that may drain pus    · Bumpy, raised skin that feels like an orange peel  Call 911 if:   · You have sudden trouble breathing or chest pain  Seek care immediately if:   · Your wound gets larger and more painful  · You feel a crackling under your skin when you touch it  · You have purple dots or bumps on your skin, or you see bleeding under your skin  · You have new swelling and pain in your legs  · The red, warm, swollen area gets larger  · You see red streaks coming from the infected area  Contact your healthcare provider if:   · You have a fever  · Your fever or pain does not go away or gets worse  · The area does not get smaller after 2 days of antibiotics  · Your skin is flaking or peeling off  · You have questions or concerns about your condition or care  Treatment for cellulitis  may decrease symptoms, stop the infection from spreading, and cure the infection  Treatment depends on how severe your cellulitis is  Cellulitis may go away on its own  You may  instead need antibiotics to help treat the bacterial infection and medicines for pain   Your healthcare provider may draw a [place] around the edges of your cellulitis  If your cellulitis spreads, your healthcare provider will see it outside of the [place]  Manage your symptoms:   · Elevate the area above the level of your heart  as often as you can  This will help decrease swelling and pain  Prop the area on pillows or blankets to keep it elevated comfortably  · Clean the area daily until the wound scabs over  Gently wash the area with soap and water  Pat dry  Use dressings as directed  · Place cool or warm, wet cloths on the area as directed  Use clean cloths and clean water  Leave it on the area until the cloth is room temperature  Pat the area dry with a clean, dry cloth  The cloths may help decrease pain  Prevent cellulitis:   · Do not scratch bug bites or areas of injury  You increase your risk for cellulitis by scratching these areas  · Do not share personal items, such as towels, clothing, and razors  · Clean exercise equipment  with germ-killing  before and after you use it  · Wash your hands often  Use soap and water  Wash your hands after you use the bathroom, change a child's diapers, or sneeze  Wash your hands before you prepare or eat food  Use lotion to prevent dry, cracked skin  · Wear pressure stockings as directed  You may be told to wear the stockings if you have peripheral edema  The stockings improve blood flow and decrease swelling  · Treat athlete's foot  This can help prevent the spread of a bacterial skin infection  Follow up with your healthcare provider within 3 days, or as directed: Your healthcare provider will check if your cellulitis is getting better  You may need different medicine  Write down your questions so you remember to ask them during your visits  © 2017 [address]  Information is for End User's use only and may not be sold, redistributed or otherwise used for commercial purposes   All illustrations and images included in [employer] are the copyrighted property of [employer] STEFAN [employer] , [employer]  or Cornell Larson  The above information is an  only  It is not intended as medical advice for individual conditions or treatments  Talk to your doctor, nurse or pharmacist before following any medical regimen to see if it is safe and effective for you  ÇáÊåÇÈ ÇáäÓíÌ ÇáÎáæí   ÇáÑÚÇíÉ ÇáÎÇÑÌíÉ ááãÑÖì:   ÇáÊåÇÈ ÇáäÓíÌ ÇáÎáæí  åæ ÚÏæì ÌáÏíÉ ÊÓÈÈåÇ ÇáÈßÊÑíÇ  ÛÇáÈðÇ ãÇ íÙåÑ ÇáÊåÇÈ ÇáäÓíÌ ÇáÎáæí Úáì ÇáÓíÞÇä¡ æÇáÞÏãíä¡ æÇáÐÑÇÚíä¡ æÇáíÏíä¡ Ãæ ÇáæÌå  ÊÊÖãä [id number] æÇáÃÚÑÇÖ ÇáÔÇÆÚÉ ãÇ íáí:   · ÊÚÑøÖ ãäØÞÉ ãä ÇáÌáÏ ááÇÍãÑÇÑ æÇáÓÎæäÉ æÇáÊæÑøã    · Ãáã ÚäÏ áãÓ ÇáãäØÞÉ    · äÊæÁÇÊ æÈËæÑ (ÎÑøÇÌ) ÞÏ íÄÏí Åáì ÅÎÑÇÌ ÕÏíÏ  · äÊæÁ IUISK æÇÑÊÝÇÚå áíÈÏæ ßÞÔÑ BWAJALIC  ÇÊÕá ÈÑÞã 911 ÅÐÇ:   · ßäÊ ÊÚÇäí ãä ÕÚæÈÉ ãÝÇÌÆÉ Ýí ÇáÊäÝÓ Ãæ Ãáã Ýí ÇáÕÏÑ  ÇÍÕá Úáì ÑÚÇíÉ ÝæÑðÇ ÅÐÇ:   · Mayi Raddle SNVUL ÃßÈÑ ÍÌãðÇ IHEUKY ÈÕæÑÉ ÃßÈÑ  · ÔÚÑÊ ÈÝÑÞÚÉ ÊÍÊ ÌáÏß ÚäÏ áãÓå  · ßÇä áÏíß äÞØ Ãæ ßÊá ÃÑÌæÇäíÉ Úáì ÌáÏß¡ Ãæ ÔÇåÏÊ äÒíÝÇ ÊÍÊ ÌáÏß  · ßäÊ ÊÚÇäí ãä ÊæÑã æÃáã ÌÏíÏ Ýí ÓÇÞíß  · ßÇäÊ ÇáãäØÞÉ ÇáÍãÑÇÁ Ãæ ÇáÏÇÝÆÉ Ãæ ÇáãÊæÑãÉ íßÈÑ ÍÌãåÇ  · ÞÏ ÊÑì ÎØæØðÇ ÍãÑÇÁ ÊÎÑÌ ãä ÇáãäØÞÉ ÇáãÕÇÈÉ  íõÑÌì UXKWFOZ ÈãÞÏã ÇáÑÚÇíÉ ÇáÕÍíÉ áÏíß Ýí OJCZBDC ÇáÊÇáíÉ:   · Ýí ÍÇáÉ ÇáÅÕÇÈÉ ZQFRQ ãä ÇáÍãì (ÇÑÊÝÇÚ ÇáÍÑÇÑÉ)  · áã íÒá ÇáÃáã Ãæ ÇáÍãì Ãæ ÅÐÇ ßÇä íÒÏÇÏ ÓæÁðÇ  · áÇ ÊÕÛÑ ÇáãäØÞÉ ÈÚÏ ãÑæÑ íæãíä ãä ÊäÇæá ÇáãÖÇÏÇÊ ÇáÍíæíÉ  · ÈÏÃ ÌáÏß Ýí ÇáÊÞÔÑ Ãæ Êßæíä ÇáÞÔæÑ  · Beaulah Hole [id number] Ãæ ãÎÇæÝ EKEGCT ÈÍÇáÊß Ãæ AVESQJGT ÇáÊí ÊÊáÞÇåÇ  ÚáÇÌ ÇáÊåÇÈ ÇáäÓíÌ ÇáÎáæí  íÓÇåã Ýí ÊÎÝíÝ ÇáÃÚÑÇÖ¡ æãäÚ ÇáÚÏæì ãä LTJEZNDN¡ æÚáÇÌ ÇáÚÏæì  íÚÊãÏ ÇáÚáÇÌ Úáì ãÏì ÔÏÉ ÇáÊåÇÈ ÇáäÓíÌ ÇáÎáæí  ÞÏ ÊõÔÝì ÍÇáÉ ÇáäÓíÌ ÇáÎáæí ãä ÊáÞÇÁ äÝÓåÇ  ÞÏ  ÊÍÊÇÌ ÈÏáÇð ãä Ðáß Åáì ÊäÇæá ÇáãÖÇÏÇÊ ÇáÍíæíÉ ááãÓÇÚÏÉ Ýí ÚáÇÌ ÇáÚÏæì ÇáÈßÊíÑíÉ æÊäÇæá ÇáÃÏæíÉ áÊÎÝíÝ ÇáÃáã  ÞÏ íÑÓã ãÞÏã ÇáÑÚÇíÉ ÇáÕÍíÉ ÇáÐí DIKJNQ ãÚå ÏÇÆÑÉ Íæá ÇáÍæÇÝ ÇáãÍíØÉ ÈÇáÊåÇÈ ÇáäÓíÌ ÇáÎáæí áÏíß   ÅÐÇ ÇäÊÔÑ ÇáÊåÇÈ ÇáäÓíÌ ÇáÎáæí¡ ÝÓæÝ íáÇÍÙ ãÞÏã ÇáÑÚÇíÉ ÇáÕÍíÉ ÇäÊÔÇÑ BVTFIV ÎÇÑÌ ÇáÏÇÆÑÉ  [facility] [place] RTXKALT:   · ÇÑÝÚ WRIDJFX áÊßæä ÝæÞ ãÓÊæì SGELF  ÈÞÏÑ ãÇ ÊÓÊØíÚ  ÓíÓÇÚÏ Ðáß Úáì ÊÎÝíÝ ÇáÃáã æÇáÊæÑã  ÇÓäÏ ÇáãäØÞÉ Úáì æÓÇÆÏ Ãæ ÈØÇäíÇÊ ááÍÝÇÙ Úáì ÈÞÇÆåÇ ãÑÝæÚÉ ÈÔßá ãÑíÍ  · Þã ÈÊäÙíÝ ÇáãäØÞÉ íæãíðÇ ÍÊì ÊÎÊÝí ÞÔæÑ ÇáÌÑÍ  ÇÛÓá ÇáãäØÞÉ ÈÇáãÇÁ æÇáÕÇÈæä ÈÑÝÞ  ÌÝÝ ÇáãäØÞÉ ÌíÏðÇ  Yuan Gaudy HLFYMEIL Christoph Bouquet [id number]  · Þã ÈæÖÚ ÞØÚÉ ÞãÇÔ ÑØÈÉ¡ Ãæ ÈÇÑÏÉ¡ Ãæ ÏÇÝÆÉ Úáì ÇáãäØÞÉ ÍÓÈ [id number]  ÇÓÊÎÏã ÞØÚÉ ÞãÇÔ äÙíÝÉ æãÇÁ äÙíÝ  ÇÊÑßåÇ Úáì ÇáãäØÞÉ ÍÊì íßæä ááÞãÇÔÉ äÝÓ ÏÑÌÉ ÍÑÇÑÉ ÇáÛÑÝÉ  ÌÝÝ ÇáãäØÞÉ ÌíÏðÇ JMZTDAYK ÞØÚÉ ÞãÇÔ äÙíÝÉ æÌÇÝÉ  ÞÏ ÊÓÇÚÏ ÞØÚÉ ÇáÞãÇÔ Úáì ÊÎÝíÝ ÇáÃáã  ÇáæÞÇíÉ ãä ÇáÊåÇÈ ÇáäÓíÌ ÇáÎáæí:   · áÇ ÊÞã ÈÍß áÏÛÇÊ ÇáÍÔÑÇÊ Ãæ ãäÇØÞ ÇáÅÕÇÈÉ  ÝåÐÇ ÇáÝÚá íÄÏí Åáì ÒíÇÏÉ ÎØÑ ÇáÊåÇÈ ÇáäÓíÌ ÇáÎáæí ãä ÎáÇá Íß Êáß ÇáãäÇØÞ  · áÇ ÊÊÔÇÑß ÇáÃÛÑÇÖ ÇáÔÎÕíÉ¡ ãËá ÇáãäÇÔÝ æÇáãáÇÈÓ æÃãæÇÓ ÇáÍáÇÞÉ  · äÙÝ ÇáÃÏæÇÊ ÇáÑíÇÖíÉ  ÈãäÙÝ ÞÇÊá ááÌÑÇËíã ÞÈá æÈÚÏ ÇÓÊÎÏÇãå  · ÇÛÓá íÏíß ÏÇÆãðÇ  ÇÓÊÎÏã ÇáãÇÁ æÇáÕÇÈæä  ÇÛÓá íÏíß ÈÚÏ ÇÓÊÎÏÇã ÇáÍãÇã¡ Ãæ ÊÛííÑ ÍÝÇÖÇÊ ÇáØÝá Ãæ ÇáÚØÓ  ÇÛÓá íÏíß ÞÈá ÅÚÏÇÏ ÇáØÚÇã Ãæ ÊäÇæáå  ÇÓÊÎÏã NIJHAZG WUGRA áãäÚ ÌÝÇÝ ÇáÌáÏ æÊÔÞÞå  · ÇÑÊÏ ÌæÇÑÈ ÇáÖÛØ ÍÓÈ ULEUPOBAT  ÞÏ íõØáÈ ãäß ÇÑÊÏÇÁ ÌæÇÑÈ Ýí ÍÇá ßäÊ ÊÚäí ãä æÐãÉ ãÍíØíÉ  ÝÇáÌæÇÈ ÊÚãá Úáì ÊÍÓíä ÊÏÝÞ ÇáÏã æÊÎÝíÝ ÇáÊæÑã  · ÚáÇÌ ÊíäíÇ ÇáÞÏã ÇáÑíÇÖíÉ  íãßä Ãä íÓåã Ðáß Ýí ÇáæÞÇíÉ ãä ÇäÊÔÇÑ ÇáÚÏæì ÇáÈßÊíÑíÉ Ýí ÇáÌáÏ  íÌÈ ÇáãÊÇÈÚÉ ãÚ ãÞÏã ÇáÑÚÇíÉ ÇáÕÍíÉ áÏíß ÎáÇá 3 ÃíÇã Ãæ æÝÞðÇ ááÅÑÔÇÏÇÊ ÇáÊÇáíÉ:  ÓæÝ íÞæã ãÞÏã ÇáÑÚÇíÉ ÇáÕÍíÉ ÈÝÍÕß ááÊÃßÏ ãä ÊÍÓä ÍÇáÉ ÇáÊåÇÈ ÇáäÓíÌ ÇáÎáæí  ÞÏ ÊÍÊÇÌ áÊäÇæá ÏæÇÁ ãÎÊáÝ  íõÝÖá ÊÏæíä ÇáÃÓÆáÉ ÍÊì áÇ íÊã äÓíÇäåÇ ÃËäÇÁ ÒíÇÑÉ ÇáØÈíÈ  © 2017 [address] Information is for End User's use only and may not be sold, redistributed or otherwise used for commercial purposes  All illustrations and images included in CareNotes® are the copyrighted property of [employer]  or [employer]  The above information is an  only   It is not intended as medical advice for individual conditions or treatments  Talk to your doctor, nurse or pharmacist before following any medical regimen to see if it is safe and effective for you

## 2019-07-16 NOTE — ASSESSMENT & PLAN NOTE
Significant increase in swelling and irritation  Given that she is having now swelling into her fingers, will start prednisone taper  Unfortunately, she has been using over-the-counter hydrocortisone, it is not really made a difference along with antihistamines  Because of this, steroids today  We did review problems with steroids, but there is really no other option for her

## 2019-07-16 NOTE — ASSESSMENT & PLAN NOTE
Patient appears to have some increased redness and swelling along with warmth of the right hand  This could just be the allergic reaction, but it does appear to be getting worse  There is some increased swelling in the wrist as well as up into the forearm  Given this, recommend Duricef antibiotic  She will get that today from pharmacy

## 2019-07-19 ENCOUNTER — APPOINTMENT (OUTPATIENT)
Dept: PHYSICAL THERAPY | Facility: CLINIC | Age: 54
End: 2019-07-19
Payer: COMMERCIAL

## 2019-07-23 DIAGNOSIS — F41.9 ANXIETY: Chronic | ICD-10-CM

## 2019-07-23 RX ORDER — ALPRAZOLAM 0.25 MG/1
0.25 TABLET ORAL
Qty: 30 TABLET | Refills: 0 | Status: SHIPPED | OUTPATIENT
Start: 2019-07-23 | End: 2020-06-02 | Stop reason: SDUPTHER

## 2019-07-24 ENCOUNTER — APPOINTMENT (OUTPATIENT)
Dept: PHYSICAL THERAPY | Facility: CLINIC | Age: 54
End: 2019-07-24
Payer: COMMERCIAL

## 2019-07-24 DIAGNOSIS — M15.9 PRIMARY OSTEOARTHRITIS INVOLVING MULTIPLE JOINTS: ICD-10-CM

## 2019-07-24 RX ORDER — ORPHENADRINE CITRATE 100 MG/1
100 TABLET, EXTENDED RELEASE ORAL DAILY PRN
Qty: 30 TABLET | Refills: 5 | Status: SHIPPED | OUTPATIENT
Start: 2019-07-24 | End: 2019-12-10 | Stop reason: SDUPTHER

## 2019-07-25 ENCOUNTER — OFFICE VISIT (OUTPATIENT)
Dept: OBGYN CLINIC | Facility: OTHER | Age: 54
End: 2019-07-25
Payer: COMMERCIAL

## 2019-07-25 VITALS
DIASTOLIC BLOOD PRESSURE: 68 MMHG | HEIGHT: 65 IN | HEART RATE: 49 BPM | SYSTOLIC BLOOD PRESSURE: 104 MMHG | BODY MASS INDEX: 23.59 KG/M2 | WEIGHT: 141.6 LBS

## 2019-07-25 DIAGNOSIS — M24.812 INTERNAL DERANGEMENT OF LEFT SHOULDER: Primary | ICD-10-CM

## 2019-07-25 PROCEDURE — 99203 OFFICE O/P NEW LOW 30 MIN: CPT | Performed by: ORTHOPAEDIC SURGERY

## 2019-07-25 NOTE — PROGRESS NOTES
Assessment  Diagnoses and all orders for this visit:    Internal derangement of left shoulder        Discussion and Plan:    · Patient has tried and failed formal physical therapy for the past 6 weeks for her left shoulder  She continues to experience symptoms and has weakness with rotator cuff testing on physical exam today  The next step in the treatment process would be to order an MRI of her left shoulder to evaluate for a possible rotator cuff tear and/or other internal derangement  · She will follow up after MRI for review of results and further treatment options based on these results  Subjective:   Patient ID: Freya Brown is a 47 y o  female      The patient presents with a chief complaint of left shoulder pain  The pain began several month(s) ago and is not associated with an acute injury  The patient describes the pain as aching and dull in intensity,  intermittent, occurring with increasing frequency in timing, and localizes the pain to the  left subacromial joint, biceps tendon  The pain is worse with movement, overhead work and overuse and relieved by rest, ice, avoiding the painful activities  The pain is not associated with numbness and tingling  The pain is not associated with constitutional symptoms  The patient is not awoken at night by the pain  The patient has had prior treatment in the form of formal physical therapy for the past 6 weeks without benefit  The following portions of the patient's history were reviewed and updated as appropriate: allergies, current medications, past family history, past medical history, past social history, past surgical history and problem list     Review of Systems   Constitutional: Negative for chills, fever and unexpected weight change  HENT: Negative for hearing loss, nosebleeds and sore throat  Eyes: Negative for pain, redness and visual disturbance  Respiratory: Negative for cough, shortness of breath and wheezing  Cardiovascular: Negative for chest pain, palpitations and leg swelling  Gastrointestinal: Negative for abdominal distention, nausea and vomiting  Endocrine: Negative for polydipsia and polyuria  Genitourinary: Negative for dysuria and hematuria  Skin: Negative for rash and wound  Neurological: Negative for dizziness, numbness and headaches  Psychiatric/Behavioral: Negative for decreased concentration and suicidal ideas  Objective:  Left Shoulder Exam     Tenderness   The patient is experiencing tenderness in the biceps tendon  Range of Motion   The patient has normal left shoulder ROM  Muscle Strength   Abduction: 4/5   External rotation: 4/5     Tests   Drop arm: positive    Other   Erythema: absent  Sensation: normal  Pulse: present             Physical Exam   Constitutional: She is oriented to person, place, and time  She appears well-developed and well-nourished  Eyes: Pupils are equal, round, and reactive to light  Pulmonary/Chest: Effort normal and breath sounds normal    Neurological: She is alert and oriented to person, place, and time  Skin: Skin is warm and dry  Psychiatric: She has a normal mood and affect  Her behavior is normal  Judgment and thought content normal          I have personally reviewed pertinent films in PACS and my interpretation is as follows      X Ray Left Shoulder performed on 11/12/2018: No acute osseous abnormality    Scribe Attestation    I,:   Grayson Denise am acting as a scribe while in the presence of the attending physician :        I,:   Kalpesh Barksdale MD personally performed the services described in this documentation    as scribed in my presence :

## 2019-07-30 ENCOUNTER — APPOINTMENT (OUTPATIENT)
Dept: PHYSICAL THERAPY | Facility: CLINIC | Age: 54
End: 2019-07-30
Payer: COMMERCIAL

## 2019-08-06 NOTE — PROGRESS NOTES
PT Discharge    Today's date: 2019  Patient name: Renae Kohli  : 1965  MRN: [id number]  Referring provider: Howard Curran MD  Dx:   Encounter Diagnosis     ICD-10-CM    1  Biceps tendinitis of left upper extremity M75 22 PT plan of care cert/re-cert   2  Chronic pain of left knee M25 562 PT plan of care cert/re-cert    A10 13    3  Iliotibial band syndrome of left side M76 32 PT plan of care cert/re-cert   4  Arthritis of left acromioclavicular joint M19 012 PT plan of care cert/re-cert       Start Time: 1800  Stop Time: 1840  Total time in clinic (min): 40 minutes    Assessment/Plan  Pt has not been present since 7/10/19  Pt's chart will be DC in compliance of facility policy as all Charts are DC within 30 days of last scheduled visit          Subjective    Objective

## 2019-08-15 ENCOUNTER — HOSPITAL ENCOUNTER (OUTPATIENT)
Dept: MRI IMAGING | Facility: HOSPITAL | Age: 54
Discharge: HOME | End: 2019-08-15
Attending: ORTHOPAEDIC SURGERY
Payer: COMMERCIAL

## 2019-08-15 ENCOUNTER — APPOINTMENT (OUTPATIENT)
Dept: LAB | Facility: CLINIC | Age: 54
End: 2019-08-15
Payer: COMMERCIAL

## 2019-08-15 DIAGNOSIS — E78.2 MIXED HYPERLIPIDEMIA: ICD-10-CM

## 2019-08-15 DIAGNOSIS — M24.812 INTERNAL DERANGEMENT OF LEFT SHOULDER: ICD-10-CM

## 2019-08-15 DIAGNOSIS — N95.0 POSTMENOPAUSAL BLEEDING: ICD-10-CM

## 2019-08-15 DIAGNOSIS — Z78.0 MENOPAUSE: ICD-10-CM

## 2019-08-15 LAB
ALBUMIN SERPL BCP-MCNC: 3.8 G/DL (ref 3.5–5)
ALP SERPL-CCNC: 89 U/L (ref 46–116)
ALT SERPL W P-5'-P-CCNC: 20 U/L (ref 12–78)
ANION GAP SERPL CALCULATED.3IONS-SCNC: 3 MMOL/L (ref 4–13)
AST SERPL W P-5'-P-CCNC: 17 U/L (ref 5–45)
BASOPHILS # BLD AUTO: 0.04 THOUSANDS/ΜL (ref 0–0.1)
BASOPHILS NFR BLD AUTO: 1 % (ref 0–1)
BILIRUB SERPL-MCNC: 0.47 MG/DL (ref 0.2–1)
BUN SERPL-MCNC: 18 MG/DL (ref 5–25)
CALCIUM SERPL-MCNC: 8.8 MG/DL (ref 8.3–10.1)
CHLORIDE SERPL-SCNC: 103 MMOL/L (ref 100–108)
CHOLEST SERPL-MCNC: 249 MG/DL (ref 50–200)
CO2 SERPL-SCNC: 30 MMOL/L (ref 21–32)
CREAT SERPL-MCNC: 0.82 MG/DL (ref 0.6–1.3)
EOSINOPHIL # BLD AUTO: 0.15 THOUSAND/ΜL (ref 0–0.61)
EOSINOPHIL NFR BLD AUTO: 3 % (ref 0–6)
ERYTHROCYTE [DISTWIDTH] IN BLOOD BY AUTOMATED COUNT: 12 % (ref 11.6–15.1)
GFR SERPL CREATININE-BSD FRML MDRD: 83 ML/MIN/1.73SQ M
GLUCOSE P FAST SERPL-MCNC: 77 MG/DL (ref 65–99)
HCT VFR BLD AUTO: 41.3 % (ref 34.8–46.1)
HDLC SERPL-MCNC: 55 MG/DL (ref 40–60)
HGB BLD-MCNC: 13.2 G/DL (ref 11.5–15.4)
IMM GRANULOCYTES # BLD AUTO: 0.02 THOUSAND/UL (ref 0–0.2)
IMM GRANULOCYTES NFR BLD AUTO: 0 % (ref 0–2)
LDLC SERPL CALC-MCNC: 166 MG/DL (ref 0–100)
LYMPHOCYTES # BLD AUTO: 1.69 THOUSANDS/ΜL (ref 0.6–4.47)
LYMPHOCYTES NFR BLD AUTO: 36 % (ref 14–44)
MCH RBC QN AUTO: 28.9 PG (ref 26.8–34.3)
MCHC RBC AUTO-ENTMCNC: 32 G/DL (ref 31.4–37.4)
MCV RBC AUTO: 90 FL (ref 82–98)
MONOCYTES # BLD AUTO: 0.49 THOUSAND/ΜL (ref 0.17–1.22)
MONOCYTES NFR BLD AUTO: 10 % (ref 4–12)
NEUTROPHILS # BLD AUTO: 2.32 THOUSANDS/ΜL (ref 1.85–7.62)
NEUTS SEG NFR BLD AUTO: 50 % (ref 43–75)
NRBC BLD AUTO-RTO: 0 /100 WBCS
PLATELET # BLD AUTO: 218 THOUSANDS/UL (ref 149–390)
PMV BLD AUTO: 10.4 FL (ref 8.9–12.7)
POTASSIUM SERPL-SCNC: 3.9 MMOL/L (ref 3.5–5.3)
PROT SERPL-MCNC: 7.4 G/DL (ref 6.4–8.2)
RBC # BLD AUTO: 4.57 MILLION/UL (ref 3.81–5.12)
SODIUM SERPL-SCNC: 136 MMOL/L (ref 136–145)
TRIGL SERPL-MCNC: 140 MG/DL
WBC # BLD AUTO: 4.71 THOUSAND/UL (ref 4.31–10.16)

## 2019-08-15 PROCEDURE — 73221 MRI JOINT UPR EXTREM W/O DYE: CPT

## 2019-08-15 PROCEDURE — 85025 COMPLETE CBC W/AUTO DIFF WBC: CPT

## 2019-08-15 PROCEDURE — 36415 COLL VENOUS BLD VENIPUNCTURE: CPT

## 2019-08-15 PROCEDURE — 80061 LIPID PANEL: CPT

## 2019-08-15 PROCEDURE — 80053 COMPREHEN METABOLIC PANEL: CPT

## 2019-08-22 ENCOUNTER — OFFICE VISIT (OUTPATIENT)
Dept: OBGYN CLINIC | Facility: OTHER | Age: 54
End: 2019-08-22
Payer: COMMERCIAL

## 2019-08-22 VITALS
DIASTOLIC BLOOD PRESSURE: 64 MMHG | HEART RATE: 50 BPM | HEIGHT: 65 IN | SYSTOLIC BLOOD PRESSURE: 104 MMHG | BODY MASS INDEX: 23.79 KG/M2 | WEIGHT: 142.8 LBS

## 2019-08-22 DIAGNOSIS — M75.51 SUBACROMIAL BURSITIS OF RIGHT SHOULDER JOINT: ICD-10-CM

## 2019-08-22 DIAGNOSIS — M75.81 TENDINITIS OF RIGHT ROTATOR CUFF: Primary | ICD-10-CM

## 2019-08-22 PROCEDURE — 20610 DRAIN/INJ JOINT/BURSA W/O US: CPT | Performed by: ORTHOPAEDIC SURGERY

## 2019-08-22 PROCEDURE — 99214 OFFICE O/P EST MOD 30 MIN: CPT | Performed by: ORTHOPAEDIC SURGERY

## 2019-08-22 RX ORDER — BETAMETHASONE SODIUM PHOSPHATE AND BETAMETHASONE ACETATE 3; 3 MG/ML; MG/ML
6 INJECTION, SUSPENSION INTRA-ARTICULAR; INTRALESIONAL; INTRAMUSCULAR; SOFT TISSUE
Status: COMPLETED | OUTPATIENT
Start: 2019-08-22 | End: 2019-08-22

## 2019-08-22 RX ORDER — BUPIVACAINE HYDROCHLORIDE 2.5 MG/ML
2 INJECTION, SOLUTION INFILTRATION; PERINEURAL
Status: COMPLETED | OUTPATIENT
Start: 2019-08-22 | End: 2019-08-22

## 2019-08-22 RX ADMIN — BUPIVACAINE HYDROCHLORIDE 2 ML: 2.5 INJECTION, SOLUTION INFILTRATION; PERINEURAL at 10:30

## 2019-08-22 RX ADMIN — BETAMETHASONE SODIUM PHOSPHATE AND BETAMETHASONE ACETATE 6 MG: 3; 3 INJECTION, SUSPENSION INTRA-ARTICULAR; INTRALESIONAL; INTRAMUSCULAR; SOFT TISSUE at 10:30

## 2019-08-22 NOTE — PROGRESS NOTES
Assessment  Diagnoses and all orders for this visit:    Tendinitis of right rotator cuff    Subacromial bursitis of right shoulder joint        Discussion and Plan:    The patient has an MRI and examination consistent with left shoulder impingement syndrome  There is acromioclavicular joint osteoarthritis on the MRI as well but I reviewed with the patient that this is clinically insignificant as she does not have symptoms from the acromioclavicular joint  I have discussed with the patient the pathophysiology of this diagnosis (subacromial impingement)  and reviewed how the examination correlates with this diagnosis  Treatment options were discussed at length and after discussing these treatment options, the patient elected for left shoulder SA injection  She will continue a course of physical therapy and follow up as needed  Patient will call if symptoms persist and we could consider either repeat injection in the future for diagnostic arthroscopy, I think that is unlikely to be the case  Subjective:   Patient ID: Reena Ignacio is a 47 y o  female      HPI  Patient presents today to discuss the findings of her left shoulder MRI  The pain began several month(s) ago and is not associated with an acute injury  The patient describes the pain as aching and dull in intensity,  intermittent, occurring with increasing frequency in timing, and localizes the pain to the  left subacromial joint, biceps tendon  The pain is worse with movement, overhead work and overuse and relieved by rest, ice, avoiding the painful activities  The pain is not associated with numbness and tingling  The pain is not associated with constitutional symptoms  The patient is not awoken at night by the pain          The following portions of the patient's history were reviewed and updated as appropriate: allergies, current medications, past family history, past medical history, past social history, past surgical history and problem list     Review of Systems   Constitutional: Negative for chills and fever  HENT: Negative for drooling and sneezing  Eyes: Negative for redness  Respiratory: Negative for cough and wheezing  Gastrointestinal: Negative for nausea and vomiting  Musculoskeletal:        Please see ortho exam   Psychiatric/Behavioral: Negative for behavioral problems  The patient is not nervous/anxious  Objective:  Left Shoulder Exam     Tenderness   The patient is experiencing no tenderness  Range of Motion   The patient has normal left shoulder ROM  Muscle Strength   Abduction: 4/5   External rotation: 4/5     Tests   Caro test: positive  Impingement: positive    Other   Erythema: absent  Sensation: normal  Pulse: present             Physical Exam   Constitutional: She is oriented to person, place, and time  She appears well-developed and well-nourished  Eyes: Pupils are equal, round, and reactive to light  Pulmonary/Chest: Effort normal and breath sounds normal    Neurological: She is alert and oriented to person, place, and time  Skin: Skin is warm and dry  Psychiatric: She has a normal mood and affect  Her behavior is normal  Judgment and thought content normal    Vitals reviewed        Large joint arthrocentesis: L subacromial bursa  Date/Time: 8/22/2019 10:30 AM  Consent given by: parent  Site marked: site marked  Timeout: Immediately prior to procedure a time out was called to verify the correct patient, procedure, equipment, support staff and site/side marked as required   Supporting Documentation  Indications: pain   Procedure Details  Location: shoulder - L subacromial bursa  Preparation: Patient was prepped and draped in the usual sterile fashion  Needle size: 22 G  Ultrasound guidance: no  Approach: lateral  Medications administered: 2 mL bupivacaine 0 25 %; 6 mg betamethasone acetate-betamethasone sodium phosphate 6 (3-3) mg/mL    Patient tolerance: patient tolerated the procedure well with no immediate complications  Dressing:  Sterile dressing applied          I have personally reviewed pertinent films in PACS and my interpretation is as follows  Left shoulder MRI shows intact rotator cuff with mild supraspinatus tendinosis and moderate AC OA        Scribe Attestation    I,:   Roxana Walker am acting as a scribe while in the presence of the attending physician :        I,:   Piedad Dhillon MD personally performed the services described in this documentation    as scribed in my presence :

## 2019-08-22 NOTE — PATIENT INSTRUCTIONS

## 2019-11-23 ENCOUNTER — OFFICE VISIT (OUTPATIENT)
Dept: URGENT CARE | Facility: CLINIC | Age: 54
End: 2019-11-23
Payer: COMMERCIAL

## 2019-11-23 VITALS
HEART RATE: 74 BPM | RESPIRATION RATE: 14 BRPM | HEIGHT: 64 IN | TEMPERATURE: 97.4 F | DIASTOLIC BLOOD PRESSURE: 69 MMHG | SYSTOLIC BLOOD PRESSURE: 113 MMHG | OXYGEN SATURATION: 95 % | BODY MASS INDEX: 25.2 KG/M2 | WEIGHT: 147.6 LBS

## 2019-11-23 DIAGNOSIS — M54.50 ACUTE BILATERAL LOW BACK PAIN WITHOUT SCIATICA: Primary | ICD-10-CM

## 2019-11-23 PROCEDURE — S9088 SERVICES PROVIDED IN URGENT: HCPCS | Performed by: PHYSICIAN ASSISTANT

## 2019-11-23 PROCEDURE — 99203 OFFICE O/P NEW LOW 30 MIN: CPT | Performed by: PHYSICIAN ASSISTANT

## 2019-11-23 RX ORDER — CLINDAMYCIN PHOSPHATE 11.9 MG/ML
SOLUTION TOPICAL
Refills: 0 | COMMUNITY
Start: 2019-10-12

## 2019-11-23 RX ORDER — VALACYCLOVIR HYDROCHLORIDE 1 G/1
TABLET, FILM COATED ORAL
Refills: 0 | COMMUNITY
Start: 2019-11-19 | End: 2019-12-12

## 2019-11-23 RX ORDER — PREDNISONE 10 MG/1
TABLET ORAL
Qty: 21 TABLET | Refills: 0 | Status: SHIPPED | OUTPATIENT
Start: 2019-11-23 | End: 2020-01-22 | Stop reason: ALTCHOICE

## 2019-11-23 RX ORDER — KETOROLAC TROMETHAMINE 30 MG/ML
30 INJECTION, SOLUTION INTRAMUSCULAR; INTRAVENOUS ONCE
Status: COMPLETED | OUTPATIENT
Start: 2019-11-23 | End: 2019-11-23

## 2019-11-23 RX ADMIN — KETOROLAC TROMETHAMINE 30 MG: 30 INJECTION, SOLUTION INTRAMUSCULAR; INTRAVENOUS at 12:08

## 2019-11-23 NOTE — PROGRESS NOTES
[facility]        NAME: Vishal Gil is a 49 y o  female  : 1965    MRN: [id number]  DATE: 2019  TIME: 9:48 AM    Assessment and Plan   Acute bilateral low back pain without sciatica [M54 5]  1  Acute bilateral low back pain without sciatica  ketorolac (TORADOL) injection 30 mg    predniSONE 10 mg tablet         Patient Instructions       Follow up with PCP in 3-5 days  Proceed to  ER if symptoms worsen  Chief Complaint     Chief Complaint   Patient presents with    Back Pain     Severe low back pain that started yesterday- she was going to throw something in garbage and felt something in her back  History of Present Illness       Back Pain   This is a new problem  The current episode started yesterday  The problem occurs constantly  The problem is unchanged  The pain is present in the lumbar spine  The quality of the pain is described as aching, burning and cramping  The pain does not radiate  The pain is at a severity of 8/10  The pain is moderate  The symptoms are aggravated by bending, lying down, sitting and twisting  Associated symptoms include weakness  Pertinent negatives include no abdominal pain, bladder incontinence, bowel incontinence, chest pain, dysuria, fever, headaches, numbness, paresis, paresthesias, perianal numbness or tingling  Treatments tried: Muscle relaxers  Review of Systems   Review of Systems   Constitutional: Negative for chills, fatigue and fever  HENT: Negative for congestion, ear pain, hearing loss, postnasal drip, sinus pressure, sinus pain and sore throat  Eyes: Negative for pain and discharge  Respiratory: Negative for chest tightness and shortness of breath  Cardiovascular: Negative for chest pain  Gastrointestinal: Negative for abdominal pain, bowel incontinence, constipation, nausea and vomiting  Genitourinary: Negative for bladder incontinence, difficulty urinating and dysuria     Musculoskeletal: Positive for back pain  Negative for arthralgias and myalgias  Skin: Negative for rash  Neurological: Positive for weakness  Negative for dizziness, tingling, numbness, headaches and paresthesias  Psychiatric/Behavioral: Negative for behavioral problems  Current Medications       Current Outpatient Medications:     ALPRAZolam (XANAX) 0 25 mg tablet, Take 1 tablet (0 25 mg total) by mouth daily at bedtime as needed for anxiety, Disp: 30 tablet, Rfl: 0    ascorbic acid (VITAMIN C) 500 mg tablet, Take 500 mg by mouth 2 (two) times a day , Disp: , Rfl:     Calcium-Vitamin D (CALTRATE 600 PLUS-VIT D PO), Take by mouth , Disp: , Rfl:     clindamycin (CLEOCIN T) 1 % external solution, apply topically to affected area AS NEEDED FOR FLARES OF THE SCALP, Disp: , Rfl: 0    dicyclomine (BENTYL) 20 mg tablet, Take 1 tablet (20 mg total) by mouth every 6 (six) hours, Disp: 60 tablet, Rfl: 2    estradiol (VAGIFEM, YUVAFEM) 10 MCG TABS vaginal tablet, 1 tablet vaginally q hs x 14 days then twice weekly, Disp: 40 tablet, Rfl: 3    glucosamine-chondroitin 500-400 MG tablet, Take 1 tablet by mouth 3 (three) times a day , Disp: , Rfl:     methocarbamol (ROBAXIN) 750 mg tablet, Take 1 tablet by mouth 2 (two) times a day as needed, Disp: , Rfl:     Multiple Vitamins-Minerals (MULTIVITAMIN WITH MINERALS) tablet, Take 1 tablet by mouth daily  , Disp: , Rfl:     orphenadrine (NORFLEX) 100 mg tablet, Take 1 tablet (100 mg total) by mouth daily as needed for muscle spasms, Disp: 30 tablet, Rfl: 5    pravastatin (PRAVACHOL) 20 mg tablet, Take 1 tablet (20 mg total) by mouth daily, Disp: 90 tablet, Rfl: 3    Suvorexant (BELSOMRA) 10 MG TABS, Take 1 tablet (10 mg total) by mouth daily at bedtime as needed (Insomnia) for up to 180 days, Disp: 90 tablet, Rfl: 1    TiZANidine (ZANAFLEX) 4 MG capsule, Take 4 mg by mouth daily as needed for muscle spasms  , Disp: , Rfl:     traMADol (ULTRAM) 50 mg tablet, take 1 tablet by mouth every 6 hours if needed for pain, Disp: 60 tablet, Rfl: 1    TURMERIC PO, Take by mouth, Disp: , Rfl:     valACYclovir (VALTREX) 1,000 mg tablet, , Disp: , Rfl: 0    zinc gluconate 50 mg tablet, Take 50 mg by mouth daily  , Disp: , Rfl:     diclofenac (VOLTAREN) 75 mg EC tablet, Take 1 tablet (75 mg total) by mouth 2 (two) times a day as needed (Osteoarthritis) for up to 90 days, Disp: 60 tablet, Rfl: 2    pantoprazole (PROTONIX) 40 mg tablet, Take 1 tablet (40 mg total) by mouth daily (Patient not taking: Reported on 5/23/2019), Disp: 30 tablet, Rfl: 11    predniSONE 10 mg tablet, Day 1 Take 6 tablets by mouth then decrease by one daily until gone , Disp: 21 tablet, Rfl: 0    valACYclovir (VALTREX) 1,000 mg tablet, Take 1 tablet (1,000 mg total) by mouth daily as needed (oral lesion) for up to 4 days, Disp: 4 tablet, Rfl: 5    Current Facility-Administered Medications:     ketorolac (TORADOL) injection 30 mg, 30 mg, Intramuscular, Once, Randee Marley PA-C    Current Allergies     Allergies as of 11/23/2019 - Reviewed 11/23/2019   Allergen Reaction Noted    Iodinated diagnostic agents Swelling and Eye Swelling 08/29/2012    Morphine Vomiting 04/21/2012    Morphine and related  02/16/2016    Vicodin [hydrocodone-acetaminophen]  02/16/2016    Gentamicin Rash 04/21/2012            The following portions of the patient's history were reviewed and updated as appropriate: allergies, current medications, past family history, past medical history, past social history, past surgical history and problem list      Past Medical History:   Diagnosis Date    Acid reflux     Allergic reaction to dye     Resolved - [id number]    Anxiety     Arthritis     Atrophic vaginitis     Benign familial tremor     Last assessed - [id number]    Cervical back pain with evidence of disc disease     Chronic otitis media of right ear     Last assessed - [id number]    Contact dermatitis     Last assessed - [id number]    Dermatitis  Disturbance of smell     Last assessed - 29Apr2013    DJD (degenerative joint disease)     Endometriosis     Last assessed - [id number]    Esophageal reflux     Last assessed - [id number]    Folliculitis     Last assessed - [id number]    Headache     Herpes simplex     High cholesterol     Hyperlipidemia     Last assessed - [id number]    Lateral epicondylitis of left elbow     Last assessed - [id number]    Lumbar back pain     Myofascial pain syndrome     Neoplasm of skin     Last assessed - [id number]    Osteoarthritis     PONV (postoperative nausea and vomiting)     severe    Postmenopausal atrophic vaginitis     Last assessed - [id number]    Prophylactic antibiotic     Last assessed - [id number]    Rotator cuff disorder     Sleep apnea     mild no cpap    Taste impairment     taste disturbances - Last assessed - 29Apr2013    Tremor, hereditary, benign     Wound, open, finger     Last assessed - [id number]       Past Surgical History:   Procedure Laterality Date    COLONOSCOPY      DIAGNOSTIC LAPAROSCOPY      LAPAROSCOPIC ENDOMETRIOSIS FULGURATION      Laparoscop excis endometriotic tissue uterosacral ligaments    MN COLONOSCOPY FLX DX W/COLLJ SPEC WHEN PFRMD N/A 12/6/2018    Procedure: COLONOSCOPY;  Surgeon: Tirso Juan DO;  Location: [facility]; Service: Gastroenterology    MN ESOPHAGOGASTRODUODENOSCOPY TRANSORAL DIAGNOSTIC N/A 12/6/2018    Procedure: ESOPHAGOGASTRODUODENOSCOPY (EGD); Surgeon: Tirso Juan DO;  Location: [facility];   Service: Gastroenterology    MN TOTAL HIP ARTHROPLASTY Left 5/16/2016    Procedure: ANTERIOR TOTAL HIP ARTHROPLASTY ;  Surgeon: Piotr Cuevas MD;  Location: [facility];  Service: Orthopedics    REVISION TOTAL HIP ARTHROPLASTY  06/15/2011    TOTAL HIP ARTHROPLASTY Right     TOTAL HIP ARTHROPLASTY  10/20/2010    UPPER GASTROINTESTINAL ENDOSCOPY         Family History   Problem Relation Age of Onset    Hypertension Mother     Stroke Mother     Atrial fibrillation Mother     Diverticulitis Mother         of colon    Esophageal cancer Father     Arthritis Father     Stroke Father         Stroke syndrome    Arthritis Brother     Other Brother         Esophageal reflux    Other Family         Back pain    Breast cancer Family     Colon cancer Family     Diabetes Family          Medications have been verified  Objective   /69   Pulse 74   Temp (!) 97 4 °F (36 3 °C)   Resp 14   Ht 5' 4" (1 626 m)   Wt 67 kg (147 lb 9 6 oz)   SpO2 95%   BMI 25 34 kg/m²        Physical Exam     Physical Exam   Constitutional: She is oriented to person, place, and time  She appears well-developed and well-nourished  HENT:   Right Ear: Tympanic membrane and external ear normal    Left Ear: Tympanic membrane and external ear normal    Neck: Normal range of motion  No edema present  Cardiovascular: Normal rate, regular rhythm, S1 normal, S2 normal and normal heart sounds  No murmur heard  Pulmonary/Chest: Effort normal and breath sounds normal  No respiratory distress  She has no wheezes  She has no rales  She exhibits no tenderness  Musculoskeletal:        Lumbar back: She exhibits decreased range of motion, tenderness (paraspinal muscles  ), pain and spasm  She exhibits no bony tenderness, no swelling and no edema  +2 DTR b/l  Negative straight leg raise  Lymphadenopathy:     She has no cervical adenopathy  Neurological: She is alert and oriented to person, place, and time  Skin: Skin is warm, dry and intact  No rash noted  Psychiatric: She has a normal mood and affect  Her speech is normal and behavior is normal    Nursing note and vitals reviewed

## 2019-11-23 NOTE — PATIENT INSTRUCTIONS
IM Toradol given in office  Continue taking muscle relaxer as prescribed  Take steroid taper as directed  Follow up with pain management  Follow up with PCP in 1-2 days  Go to the ER for worsening symptoms  Erick Gallagher

## 2019-11-26 DIAGNOSIS — M54.50 ACUTE BILATERAL LOW BACK PAIN WITHOUT SCIATICA: ICD-10-CM

## 2019-11-26 RX ORDER — CELECOXIB 200 MG/1
200 CAPSULE ORAL DAILY
Qty: 30 CAPSULE | Refills: 0 | Status: SHIPPED | OUTPATIENT
Start: 2019-11-26 | End: 2020-01-22 | Stop reason: ALTCHOICE

## 2019-11-26 NOTE — PROGRESS NOTES
Problem List Items Addressed This Visit     None      Visit Diagnoses     Acute bilateral low back pain without sciatica        Patient with continued pain  She can not tolerate NSAIDs due to Reflux  Reviewed other treatments  Celebrex 200mg daily is only other option   Sent to pharmacy    Relevant Medications    celecoxib (CeleBREX) 200 mg capsule

## 2019-12-03 DIAGNOSIS — G89.29 CHRONIC LOW BACK PAIN WITHOUT SCIATICA, UNSPECIFIED BACK PAIN LATERALITY: Primary | ICD-10-CM

## 2019-12-03 DIAGNOSIS — M54.50 CHRONIC LOW BACK PAIN WITHOUT SCIATICA, UNSPECIFIED BACK PAIN LATERALITY: Primary | ICD-10-CM

## 2019-12-03 RX ORDER — METHOCARBAMOL 750 MG/1
750 TABLET, FILM COATED ORAL 2 TIMES DAILY PRN
Qty: 180 TABLET | Refills: 1 | Status: SHIPPED | OUTPATIENT
Start: 2019-12-03 | End: 2021-07-06

## 2019-12-06 ENCOUNTER — TRANSCRIBE ORDERS (OUTPATIENT)
Dept: ADMINISTRATIVE | Facility: HOSPITAL | Age: 54
End: 2019-12-06

## 2019-12-06 DIAGNOSIS — Z12.31 SCREENING MAMMOGRAM FOR HIGH-RISK PATIENT: Primary | ICD-10-CM

## 2019-12-10 DIAGNOSIS — M54.6 CHRONIC MIDLINE THORACIC BACK PAIN: ICD-10-CM

## 2019-12-10 DIAGNOSIS — G89.29 CHRONIC MIDLINE THORACIC BACK PAIN: ICD-10-CM

## 2019-12-10 DIAGNOSIS — M15.9 PRIMARY OSTEOARTHRITIS INVOLVING MULTIPLE JOINTS: ICD-10-CM

## 2019-12-10 RX ORDER — ORPHENADRINE CITRATE 100 MG/1
100 TABLET, EXTENDED RELEASE ORAL DAILY PRN
Qty: 30 TABLET | Refills: 5 | Status: SHIPPED | OUTPATIENT
Start: 2019-12-10 | End: 2020-05-19 | Stop reason: SDUPTHER

## 2019-12-10 RX ORDER — TRAMADOL HYDROCHLORIDE 50 MG/1
50 TABLET ORAL EVERY 6 HOURS PRN
Qty: 60 TABLET | Refills: 1 | Status: SHIPPED | OUTPATIENT
Start: 2019-12-10 | End: 2020-01-22 | Stop reason: SDUPTHER

## 2019-12-12 DIAGNOSIS — B00.1 HERPES LABIALIS: ICD-10-CM

## 2019-12-12 RX ORDER — VALACYCLOVIR HYDROCHLORIDE 1 G/1
TABLET, FILM COATED ORAL
Qty: 4 TABLET | Refills: 5 | Status: SHIPPED | OUTPATIENT
Start: 2019-12-12 | End: 2020-06-02

## 2019-12-23 DIAGNOSIS — F51.01 PRIMARY INSOMNIA: ICD-10-CM

## 2019-12-23 NOTE — TELEPHONE ENCOUNTER
10/25/2019  1  06/20/2019  BELSOMRA 10 MG TABLET  30 0  30  TO HOL  [id number]  [facility] (5361)  3   [place]    09/23/2019  1  06/20/2019  BELSOMRA 10 MG TABLET  30 0  30  TO HOL  [id number]  [facility] (5361)  2   [place]    08/02/2019  1  06/20/2019  BELSOMRA 10 MG TABLET  30 0  30  TO HOL  [id number]  [facility] (5361)  1   [place]                     [place] reviewed

## 2019-12-23 NOTE — TELEPHONE ENCOUNTER
Controlled Substance Review    [place] PDMP or [place]  reviewed: No red flags were identified; safe to proceed with prescription  Based on [facility] new policy, patient will need another PCP for this in the future  Jany Ayoub

## 2020-01-22 ENCOUNTER — OFFICE VISIT (OUTPATIENT)
Dept: FAMILY MEDICINE CLINIC | Facility: CLINIC | Age: 55
End: 2020-01-22
Payer: COMMERCIAL

## 2020-01-22 VITALS
RESPIRATION RATE: 12 BRPM | BODY MASS INDEX: 24.92 KG/M2 | HEART RATE: 54 BPM | OXYGEN SATURATION: 97 % | HEIGHT: 64 IN | DIASTOLIC BLOOD PRESSURE: 62 MMHG | SYSTOLIC BLOOD PRESSURE: 110 MMHG | WEIGHT: 146 LBS | TEMPERATURE: 97.5 F

## 2020-01-22 DIAGNOSIS — F51.01 PRIMARY INSOMNIA: ICD-10-CM

## 2020-01-22 DIAGNOSIS — M47.812 OSTEOARTHRITIS OF CERVICAL SPINE, UNSPECIFIED SPINAL OSTEOARTHRITIS COMPLICATION STATUS: ICD-10-CM

## 2020-01-22 DIAGNOSIS — G89.29 CHRONIC MIDLINE THORACIC BACK PAIN: ICD-10-CM

## 2020-01-22 DIAGNOSIS — R23.2 HOT FLASHES: ICD-10-CM

## 2020-01-22 DIAGNOSIS — K21.9 GASTROESOPHAGEAL REFLUX DISEASE, ESOPHAGITIS PRESENCE NOT SPECIFIED: Primary | ICD-10-CM

## 2020-01-22 DIAGNOSIS — M54.6 CHRONIC MIDLINE THORACIC BACK PAIN: ICD-10-CM

## 2020-01-22 PROCEDURE — 99214 OFFICE O/P EST MOD 30 MIN: CPT | Performed by: FAMILY MEDICINE

## 2020-01-22 RX ORDER — OMEPRAZOLE 20 MG/1
20 CAPSULE, DELAYED RELEASE ORAL 2 TIMES DAILY
Qty: 60 CAPSULE | Refills: 5 | Status: SHIPPED | OUTPATIENT
Start: 2020-01-22 | End: 2020-01-22 | Stop reason: SDUPTHER

## 2020-01-22 RX ORDER — TRAMADOL HYDROCHLORIDE 50 MG/1
50 TABLET ORAL EVERY 6 HOURS PRN
Qty: 20 TABLET | Refills: 0 | Status: SHIPPED | OUTPATIENT
Start: 2020-01-22 | End: 2020-04-24 | Stop reason: SDUPTHER

## 2020-01-22 RX ORDER — TRAZODONE HYDROCHLORIDE 50 MG/1
50 TABLET ORAL 2 TIMES DAILY PRN
Qty: 20 TABLET | Refills: 0 | Status: SHIPPED | OUTPATIENT
Start: 2020-01-22 | End: 2020-01-22

## 2020-01-22 RX ORDER — OMEPRAZOLE 20 MG/1
20 CAPSULE, DELAYED RELEASE ORAL 2 TIMES DAILY
Qty: 60 CAPSULE | Refills: 5 | Status: SHIPPED | OUTPATIENT
Start: 2020-01-22 | End: 2022-01-21

## 2020-01-22 RX ORDER — VENLAFAXINE HYDROCHLORIDE 37.5 MG/1
37.5 TABLET, EXTENDED RELEASE ORAL
Qty: 30 TABLET | Refills: 3 | Status: SHIPPED | OUTPATIENT
Start: 2020-01-22 | End: 2020-04-22 | Stop reason: SDUPTHER

## 2020-01-22 NOTE — PROGRESS NOTES
Assessment/Plan:    No problem-specific Assessment & Plan notes found for this encounter  Diagnoses and all orders for this visit:    Gastroesophageal reflux disease, esophagitis presence not specified  Comments:  refill omeprazole 20mg BID prn  Orders:  -     Discontinue: omeprazole (PriLOSEC) 20 mg delayed release capsule; Take 1 capsule (20 mg total) by mouth 2 (two) times a day  -     omeprazole (PriLOSEC) 20 mg delayed release capsule; Take 1 capsule (20 mg total) by mouth 2 (two) times a day    Osteoarthritis of cervical spine, unspecified spinal osteoarthritis complication status  Comments:  tramadol prn  discussed intermittent use and potential interaction with effexor  pt uses very sparingly  Orders:  -     Discontinue: traZODone (DESYREL) 50 mg tablet; Take 1 tablet (50 mg total) by mouth 2 (two) times a day as needed for sleep    Hot flashes  Comments:  discussed options  will trial effexor 37 5mg daily  recheck 6 weeks  Orders:  -     venlafaxine 37 5 mg 24 hr tablet; Take 1 tablet (37 5 mg total) by mouth daily with breakfast    Primary insomnia  Comments:  continue belsomra for now, but I wonder whether the addition of the effexor will help this issue  re-eval in 6 weeks  Orders:  -     Suvorexant (BELSOMRA) 10 MG TABS; Take 1 tablet (10 mg total) by mouth daily at bedtime as needed (Insomnia)    Chronic midline thoracic back pain  Comments:  tramadol prn  Orders:  -     traMADol (ULTRAM) 50 mg tablet; Take 1 tablet (50 mg total) by mouth every 6 (six) hours as needed for severe pain        Subjective:      Patient ID: Jody Tamayo is a 49 y o  female  HPI  Pt presents as new pt with multiple issues  Pt has ongoing neck pain  She has done PT in the past   cspine and t/l spine djd  States she has undergone PT  Generally, pain is controlled  It does, however, flare at times  She uses tramadol only if pain is severe  Would like refill  Pt has troublesome hot flashes at night    These keep her from maintaining sleep  She is currently on vaginal estrogen which doesn't help with the hot flashes  Sees jossekrystle  She is also on belsomra for this issue  Abi Samuel has helped somewhat but not entirely for her sleep  Sleep problem present for years and she wakes for many reasons  Pt has hx of GERD  Lost 15 lbs last year and it made the GERD symptoms much more intermittent  She had been taking omeprazole 20mg BID, and now she takes it intermittently  Requires refill    Last labs reviewed and appropriate  The following portions of the patient's history were reviewed and updated as appropriate: allergies, current medications, past family history, past medical history, past social history, past surgical history and problem list     Review of Systems   Constitutional: Negative for chills, fatigue, fever and unexpected weight change  HENT: Negative for congestion, ear pain, hearing loss, postnasal drip, rhinorrhea, sinus pressure, sinus pain, sore throat, trouble swallowing and voice change  Eyes: Negative for pain, redness and visual disturbance  Respiratory: Negative for cough and shortness of breath  Cardiovascular: Negative for chest pain, palpitations and leg swelling  Gastrointestinal: Negative for abdominal pain, constipation, diarrhea and nausea  Endocrine: Negative for cold intolerance, heat intolerance, polydipsia and polyuria  Genitourinary: Negative for dysuria, frequency and urgency  Musculoskeletal: Positive for neck pain  Negative for arthralgias, joint swelling and myalgias  Skin: Negative for rash  No suspicious lesions   Neurological: Negative for weakness, numbness and headaches  Hematological: Negative for adenopathy           Objective:      /62   Pulse (!) 54   Temp 97 5 °F (36 4 °C)   Resp 12   Ht 5' 4 37" (1 635 m)   Wt 66 2 kg (146 lb)   SpO2 97%   BMI 24 77 kg/m²          Physical Exam   Constitutional: She is oriented to person, place, and time  She appears well-developed and well-nourished  No distress  HENT:   Head: Normocephalic and atraumatic  Right Ear: Tympanic membrane, external ear and ear canal normal    Left Ear: Tympanic membrane, external ear and ear canal normal    Nose: Nose normal    Mouth/Throat: Oropharynx is clear and moist and mucous membranes are normal  No oropharyngeal exudate  Eyes: Pupils are equal, round, and reactive to light  Conjunctivae and EOM are normal    Neck: No JVD present  Carotid bruit is not present  No thyromegaly present  Cardiovascular: Regular rhythm, S1 normal and S2 normal  Exam reveals no gallop, no S3, no S4 and no friction rub  No murmur heard  Pulmonary/Chest: Effort normal and breath sounds normal  She has no wheezes  She has no rhonchi  She has no rales  Abdominal: Soft  Bowel sounds are normal  She exhibits no distension  There is no tenderness  Lymphadenopathy:     She has no cervical adenopathy  Neurological: She is alert and oriented to person, place, and time  She has normal strength and normal reflexes  No cranial nerve deficit or sensory deficit

## 2020-02-05 ENCOUNTER — HOSPITAL ENCOUNTER (OUTPATIENT)
Dept: MAMMOGRAPHY | Facility: MEDICAL CENTER | Age: 55
Discharge: HOME | End: 2020-02-05
Payer: COMMERCIAL

## 2020-02-05 VITALS — WEIGHT: 146 LBS | HEIGHT: 64 IN | BODY MASS INDEX: 24.92 KG/M2

## 2020-02-05 DIAGNOSIS — Z12.31 SCREENING MAMMOGRAM FOR HIGH-RISK PATIENT: ICD-10-CM

## 2020-02-05 PROCEDURE — 77067 SCR MAMMO BI INCL CAD: CPT

## 2020-02-05 PROCEDURE — 77063 BREAST TOMOSYNTHESIS BI: CPT

## 2020-02-21 ENCOUNTER — CONSULT (OUTPATIENT)
Dept: PAIN MEDICINE | Facility: CLINIC | Age: 55
End: 2020-02-21
Payer: COMMERCIAL

## 2020-02-21 VITALS
DIASTOLIC BLOOD PRESSURE: 70 MMHG | SYSTOLIC BLOOD PRESSURE: 117 MMHG | BODY MASS INDEX: 24.87 KG/M2 | WEIGHT: 146 LBS | HEART RATE: 63 BPM

## 2020-02-21 DIAGNOSIS — M79.18 MYOFASCIAL PAIN SYNDROME: Primary | ICD-10-CM

## 2020-02-21 PROCEDURE — 1036F TOBACCO NON-USER: CPT | Performed by: ANESTHESIOLOGY

## 2020-02-21 PROCEDURE — 99213 OFFICE O/P EST LOW 20 MIN: CPT | Performed by: ANESTHESIOLOGY

## 2020-02-21 NOTE — PROGRESS NOTES
Assessment:  1  Myofascial pain syndrome        Plan:  The patient was experiencing an exacerbation of her lower back pain related to her lumbar degenerative disc disease that led to muscle spasms  This has now resolved  At this time, the patient was advised to contact our office should her symptoms worsen in the future  In regards to the tramadol, she only uses sparingly on an intermittent basis so will follow back up with the family doctor for further refills if necessary  My impressions and treatment recommendations were discussed in detail with the patient who verbalized understanding and had no further questions  Discharge instructions were provided  I personally saw and examined the patient and I agree with the above discussed plan of care  No orders of the defined types were placed in this encounter  No orders of the defined types were placed in this encounter  History of Present Illness:  Choco Avilez is a 49 y o  female who presents for a follow up office visit in regards to Back Pain  The patient has a history of lumbar disc disorder with radiculopathy returns for follow-up  She reports that she had aggravation of her back pain couple weeks ago after she bent down  She felt severe pain in the lower back which has subsided with a combination of tramadol and methocarbamol  She no longer has pain symptoms  I have personally reviewed and/or updated the patient's past medical history, past surgical history, family history, social history, current medications, allergies, and vital signs today  Review of Systems   Respiratory: Negative for shortness of breath  Cardiovascular: Negative for chest pain  Gastrointestinal: Negative for constipation, diarrhea, nausea and vomiting  Musculoskeletal: Positive for gait problem and joint swelling  Negative for arthralgias and myalgias  Skin: Negative for rash  Neurological: Negative for dizziness, seizures and weakness     All other systems reviewed and are negative        Patient Active Problem List   Diagnosis    DJD (degenerative joint disease)    Status post total replacement of left hip    Esophageal reflux    Anxiety    Benign familial tremor    Cervical radiculopathy    Chronic lower back pain    Dense breasts    DJD (degenerative joint disease), cervical    Endometrial polyp    Endometriosis    Episodic tension-type headache, not intractable    Hemangioma of skin    Herniated cervical disc    Herpes simplex infection    Hyperlipidemia    Lumbar degenerative disc disease    Menopause    BRENDA (obstructive sleep apnea)    Pelvic and perineal pain    Pelvic congestion syndrome    Postmenopausal bleeding    Sleep apnea    Acute non-recurrent sinusitis    Bilateral carpal tunnel syndrome    Chronic fatigue    Colon cancer screening    Chronic midline thoracic back pain    Knee pain, chronic    Insomnia    IT band syndrome    Biceps tendinitis    Acromioclavicular joint arthritis    Bee sting reaction    Cellulitis    Tendinitis of right rotator cuff    Subacromial bursitis of right shoulder joint       Past Medical History:   Diagnosis Date    Acid reflux     Allergic reaction to dye     Resolved - [id number]    Aneurysm of thoracic aorta (HCC)     last assess 2017    Anxiety     Arthritis     Atrophic vaginitis     Benign familial tremor     Last assessed - [id number]    Cervical back pain with evidence of disc disease     Chronic otitis media of right ear     Last assessed - 29Jan2015    Contact dermatitis     Last assessed - [id number]    Dermatitis     Disturbance of smell     Last assessed - 29Apr2013    DJD (degenerative joint disease)     Endometriosis     Last assessed - [id number]    Esophageal reflux     Last assessed - [id number]    Folliculitis     Last assessed - [id number]    Headache     Herpes simplex     High cholesterol     Hyperlipidemia     Last assessed - [id number]    Lateral epicondylitis of left elbow     Last assessed - [id number]    Lumbar back pain     Myofascial pain syndrome     Neoplasm of skin     Last assessed - [id number]    Osteoarthritis     PONV (postoperative nausea and vomiting)     severe    Postmenopausal atrophic vaginitis     Last assessed - [id number]    Prophylactic antibiotic     Last assessed - [id number]    Rotator cuff disorder     Sleep apnea     mild no cpap    Taste impairment     taste disturbances - Last assessed - [id number]    Tremor, hereditary, benign     Wound, open, finger     Last assessed - [id number]       Past Surgical History:   Procedure Laterality Date    COLONOSCOPY      DIAGNOSTIC LAPAROSCOPY      LAPAROSCOPIC ENDOMETRIOSIS FULGURATION      Laparoscop excis endometriotic tissue uterosacral ligaments    KS COLONOSCOPY FLX DX W/COLLJ SPEC WHEN PFRMD N/A 12/6/2018    Procedure: COLONOSCOPY;  Surgeon: Aida Terrazas DO;  Location: [facility]; Service: Gastroenterology    KS ESOPHAGOGASTRODUODENOSCOPY TRANSORAL DIAGNOSTIC N/A 12/6/2018    Procedure: ESOPHAGOGASTRODUODENOSCOPY (EGD); Surgeon: Aida Terrazas DO;  Location: [facility];   Service: Gastroenterology    KS TOTAL HIP ARTHROPLASTY Left 5/16/2016    Procedure: ANTERIOR TOTAL HIP ARTHROPLASTY ;  Surgeon: Promise Dover MD;  Location: [facility];  Service: Orthopedics    REVISION TOTAL HIP ARTHROPLASTY  06/15/2011    TOTAL HIP ARTHROPLASTY Right     TOTAL HIP ARTHROPLASTY  10/20/2010    UPPER GASTROINTESTINAL ENDOSCOPY         Family History   Problem Relation Age of Onset    Hypertension Mother     Stroke Mother     Atrial fibrillation Mother     Diverticulitis Mother         of colon    Esophageal cancer Father 80    Arthritis Father     Stroke Father         Stroke syndrome    Arthritis Brother     Other Brother         Esophageal reflux    Other Family         Back pain    Breast cancer Family     Colon cancer Family     Diabetes Family  No Known Problems Sister     No Known Problems Maternal Grandmother     No Known Problems Maternal Grandfather     Colon cancer Paternal Grandmother 66    No Known Problems Paternal Grandfather     No Known Problems Sister     No Known Problems Maternal Aunt     Breast cancer Maternal Aunt 48    Breast cancer Other         age unknown    Breast cancer Other         unknown age   Leonardo Pinedo Prostate cancer Paternal Uncle [de-identified]    Breast cancer Maternal Uncle 72       Social History     Occupational History    Occupation: Medical Professional   Tobacco Use    Smoking status: Never Smoker    Smokeless tobacco: Never Used   Substance and Sexual Activity    Alcohol use: Yes     Frequency: Monthly or less     Drinks per session: 1 or 2     Binge frequency: Never     Comment: rare    Drug use: No     Comment: Denied history of drug use    Sexual activity: Yes       Current Outpatient Medications on File Prior to Visit   Medication Sig    ALPRAZolam (XANAX) 0 25 mg tablet Take 1 tablet (0 25 mg total) by mouth daily at bedtime as needed for anxiety    ascorbic acid (VITAMIN C) 500 mg tablet Take 500 mg by mouth 2 (two) times a day   Calcium-Vitamin D (CALTRATE 600 PLUS-VIT D PO) Take by mouth   clindamycin (CLEOCIN T) 1 % external solution apply topically to affected area AS NEEDED FOR FLARES OF THE SCALP    dicyclomine (BENTYL) 20 mg tablet Take 1 tablet (20 mg total) by mouth every 6 (six) hours    estradiol (VAGIFEM, YUVAFEM) 10 MCG TABS vaginal tablet 1 tablet vaginally q hs x 14 days then twice weekly    glucosamine-chondroitin 500-400 MG tablet Take 1 tablet by mouth 3 (three) times a day   methocarbamol (ROBAXIN) 750 mg tablet Take 1 tablet (750 mg total) by mouth 2 (two) times a day as needed for muscle spasms    Multiple Vitamins-Minerals (MULTIVITAMIN WITH MINERALS) tablet Take 1 tablet by mouth daily      omeprazole (PriLOSEC) 20 mg delayed release capsule Take 1 capsule (20 mg total) by mouth 2 (two) times a day    orphenadrine (NORFLEX) 100 mg tablet Take 1 tablet (100 mg total) by mouth daily as needed for muscle spasms    Suvorexant (BELSOMRA) 10 MG TABS Take 1 tablet (10 mg total) by mouth daily at bedtime as needed (Insomnia)    traMADol (ULTRAM) 50 mg tablet Take 1 tablet (50 mg total) by mouth every 6 (six) hours as needed for severe pain    TURMERIC PO Take by mouth    venlafaxine 37 5 mg 24 hr tablet Take 1 tablet (37 5 mg total) by mouth daily with breakfast    zinc gluconate 50 mg tablet Take 50 mg by mouth daily   valACYclovir (VALTREX) 1,000 mg tablet take 1 tablet daily AS NEEDED FOR ORAL LESION UP TO 4 DAYS     No current facility-administered medications on file prior to visit  Allergies   Allergen Reactions    Iodinated Diagnostic Agents Swelling and Eye Swelling     IVP dye specifically; swollen eye lid shut    Morphine Vomiting     Reaction Date: [id number];     Morphine And Related      DENIES ALLERGY RELATES N/V TO ANESTHESIA    Vicodin [Hydrocodone-Acetaminophen]      No allergy to tylenol   Does have N/V to vicoden    Gentamicin Rash     Reaction Date: [id number];        Physical Exam:    /70   Pulse 63   Wt 66 2 kg (146 lb)   BMI 24 87 kg/m²     Constitutional:normal, well developed, well nourished, alert, in no distress and non-toxic and no overt pain behavior    Eyes:anicteric  HEENT:grossly intact  Neck:supple, symmetric, trachea midline and no masses   Pulmonary:even and unlabored  Cardiovascular:No edema or pitting edema present  Skin:Normal without rashes or lesions and well hydrated  Psychiatric:Mood and affect appropriate  Neurologic:Cranial Nerves II-XII grossly intact  Musculoskeletal:normal     Lumbar Spine Exam  Appearance:  Normal lordosis  Palpation/Tenderness:  no tenderness or spasm  Range of Motion:  Full range of motion with no pain or limitations in flexion, extension, lateral flexion and rotation  Motor Strength:  Left hip flexion:  5/5  Left hip extension:  5/5  Right hip flexion:  5/5  Right hip extension:  5/5  Left knee flexion:  5/5  Left knee extension:  5/5  Right knee flexion:  5/5  Right knee extension:  5/5  Left foot dorsiflexion:  5/5  Left foot plantar flexion:  5/5  Right foot dorsiflexion:  5/5  Right foot plantar flexion:  5/5    Imaging    MRI LUMBAR SPINE WITHOUT CONTRAST (5/16/2014)  INDICATION-  Low back pain radiating to the right leg  COMPARISON-  4/22/09   TECHNIQUE-   Sagittal T1, sagittal T2, sagittal inversion recovery, axial T1 and   axial T2, coronal T2      IMAGE QUALITY-    Diagnostic   FINDINGS-   ALIGNMENT-  Normal alignment of the lumbar spine  No compression   fracture  No spondylolysis or spondylolisthesis  No scoliosis  MARROW SIGNAL-  Endplate marrow degenerative change at L5-S1, Modic   type II is unchanged from the previous examination  DISTAL CORD AND CONUS-  Normal size and signal within the distal cord   and conus  The conus ends at the L1-L2 level  PARASPINAL SOFT TISSUES-  Left kidney is inferiorly displaced in an   ectopic location and malrotated  SACRUM-  Normal signal within the sacrum  No evidence of insufficiency   or stress fracture  LOWER THORACIC DISC SPACES-  Normal disc height and signal   No disc   herniation, canal stenosis or foraminal narrowing  LUMBAR DISC SPACES-        L1-L2-  Normal    L2-L3-  Normal    L3-L4-  Normal    L4-L5-  Disc desiccation  Annular bulging  Central annular fissure   without focal disc herniation  Minimal facet degenerative change  No   significant canal stenosis or foraminal narrowing  L5-S1-  Disc desiccation and loss of disc height  Annular bulging  Mild endplate and facet degenerative change  No canal stenosis  No   foraminal narrowing

## 2020-03-04 ENCOUNTER — OFFICE VISIT (OUTPATIENT)
Dept: FAMILY MEDICINE CLINIC | Facility: CLINIC | Age: 55
End: 2020-03-04
Payer: COMMERCIAL

## 2020-03-04 VITALS
OXYGEN SATURATION: 97 % | RESPIRATION RATE: 12 BRPM | BODY MASS INDEX: 25.1 KG/M2 | TEMPERATURE: 98.2 F | HEART RATE: 50 BPM | WEIGHT: 147 LBS | SYSTOLIC BLOOD PRESSURE: 122 MMHG | DIASTOLIC BLOOD PRESSURE: 82 MMHG | HEIGHT: 64 IN

## 2020-03-04 DIAGNOSIS — R23.2 HOT FLASHES: Primary | ICD-10-CM

## 2020-03-04 DIAGNOSIS — G47.00 INSOMNIA, UNSPECIFIED TYPE: ICD-10-CM

## 2020-03-04 PROCEDURE — 99214 OFFICE O/P EST MOD 30 MIN: CPT | Performed by: FAMILY MEDICINE

## 2020-03-04 PROCEDURE — 3008F BODY MASS INDEX DOCD: CPT | Performed by: FAMILY MEDICINE

## 2020-03-04 PROCEDURE — 1036F TOBACCO NON-USER: CPT | Performed by: FAMILY MEDICINE

## 2020-03-05 NOTE — PROGRESS NOTES
Assessment/Plan:    No problem-specific Assessment & Plan notes found for this encounter  Diagnoses and all orders for this visit:    Hot flashes  Comments:  effexor has helped  pt doesn't want to increase at present  continue at current dosing    Insomnia, unspecified type  Comments:  increase belsomra to 15mg and send portal message in a month with update  Orders:  -     Suvorexant (Belsomra) 15 MG TABS; Take 1 tablet (15 mg total) by mouth daily at bedtime        Subjective:      Patient ID: Reynaldo Cueto is a 49 y o  female  HPI  Pt presents in routine f/u for her hot flashes and sleep  At last visit, we started effexor daily  She reports her hot flashes have decreased by about 40%  Less intense  Pt's sleep remains fractured  Maintaining sleep has been difficult despite belsomra 10mg nightly    The following portions of the patient's history were reviewed and updated as appropriate: allergies, current medications, past family history, past medical history, past social history, past surgical history and problem list     Review of Systems    See hpi    Objective:      /82   Pulse (!) 50   Temp 98 2 °F (36 8 °C)   Resp 12   Ht 5' 4 25" (1 632 m)   Wt 66 7 kg (147 lb)   SpO2 97%   BMI 25 04 kg/m²          Physical Exam   Constitutional: She appears well-developed and well-nourished  Cardiovascular: Normal rate and regular rhythm     Pulmonary/Chest: Effort normal and breath sounds normal

## 2020-04-20 DIAGNOSIS — R23.2 HOT FLASHES: ICD-10-CM

## 2020-04-20 RX ORDER — VENLAFAXINE HYDROCHLORIDE 37.5 MG/1
TABLET, EXTENDED RELEASE ORAL
Qty: 90 TABLET | Refills: 0 | OUTPATIENT
Start: 2020-04-20

## 2020-04-22 DIAGNOSIS — R23.2 HOT FLASHES: ICD-10-CM

## 2020-04-22 RX ORDER — VENLAFAXINE HYDROCHLORIDE 37.5 MG/1
37.5 TABLET, EXTENDED RELEASE ORAL
Qty: 90 TABLET | Refills: 0 | Status: SHIPPED | OUTPATIENT
Start: 2020-04-22 | End: 2020-04-28

## 2020-04-24 DIAGNOSIS — G89.29 CHRONIC MIDLINE THORACIC BACK PAIN: ICD-10-CM

## 2020-04-24 DIAGNOSIS — M54.6 CHRONIC MIDLINE THORACIC BACK PAIN: ICD-10-CM

## 2020-04-27 ENCOUNTER — TELEPHONE (OUTPATIENT)
Dept: FAMILY MEDICINE CLINIC | Facility: CLINIC | Age: 55
End: 2020-04-27

## 2020-04-27 RX ORDER — TRAMADOL HYDROCHLORIDE 50 MG/1
50 TABLET ORAL EVERY 6 HOURS PRN
Qty: 20 TABLET | Refills: 0 | Status: SHIPPED | OUTPATIENT
Start: 2020-04-27

## 2020-05-05 DIAGNOSIS — B00.1 HERPES LABIALIS: ICD-10-CM

## 2020-05-05 RX ORDER — VALACYCLOVIR HYDROCHLORIDE 1 G/1
TABLET, FILM COATED ORAL
Qty: 4 TABLET | Refills: 5 | Status: CANCELLED | OUTPATIENT
Start: 2020-05-05

## 2020-05-06 ENCOUNTER — TELEPHONE (OUTPATIENT)
Dept: FAMILY MEDICINE CLINIC | Facility: CLINIC | Age: 55
End: 2020-05-06

## 2020-05-07 RX ORDER — VALACYCLOVIR HYDROCHLORIDE 1 G/1
TABLET, FILM COATED ORAL
Qty: 8 TABLET | Refills: 5 | Status: SHIPPED | OUTPATIENT
Start: 2020-05-07 | End: 2020-06-02 | Stop reason: SDUPTHER

## 2020-05-19 DIAGNOSIS — M15.9 PRIMARY OSTEOARTHRITIS INVOLVING MULTIPLE JOINTS: ICD-10-CM

## 2020-05-19 RX ORDER — ORPHENADRINE CITRATE 100 MG/1
100 TABLET, EXTENDED RELEASE ORAL DAILY PRN
Qty: 30 TABLET | Refills: 1 | Status: SHIPPED | OUTPATIENT
Start: 2020-05-19 | End: 2021-07-06 | Stop reason: SDUPTHER

## 2020-06-02 DIAGNOSIS — F41.9 ANXIETY: Chronic | ICD-10-CM

## 2020-06-02 DIAGNOSIS — B00.1 HERPES LABIALIS: ICD-10-CM

## 2020-06-02 RX ORDER — VALACYCLOVIR HYDROCHLORIDE 1 G/1
TABLET, FILM COATED ORAL
Qty: 4 TABLET | Refills: 5 | Status: SHIPPED | OUTPATIENT
Start: 2020-06-02 | End: 2021-01-07

## 2020-06-02 RX ORDER — ALPRAZOLAM 0.25 MG/1
0.25 TABLET ORAL
Qty: 30 TABLET | Refills: 0 | Status: SHIPPED | OUTPATIENT
Start: 2020-06-02 | End: 2020-12-04 | Stop reason: SDUPTHER

## 2020-06-03 DIAGNOSIS — G47.00 INSOMNIA, UNSPECIFIED TYPE: ICD-10-CM

## 2020-06-23 ENCOUNTER — TELEPHONE (OUTPATIENT)
Dept: FAMILY MEDICINE CLINIC | Facility: CLINIC | Age: 55
End: 2020-06-23

## 2020-06-24 ENCOUNTER — OFFICE VISIT (OUTPATIENT)
Dept: FAMILY MEDICINE CLINIC | Facility: CLINIC | Age: 55
End: 2020-06-24
Payer: COMMERCIAL

## 2020-06-24 VITALS
DIASTOLIC BLOOD PRESSURE: 58 MMHG | SYSTOLIC BLOOD PRESSURE: 98 MMHG | OXYGEN SATURATION: 98 % | WEIGHT: 151 LBS | BODY MASS INDEX: 25.78 KG/M2 | HEART RATE: 68 BPM | TEMPERATURE: 97.9 F | RESPIRATION RATE: 12 BRPM | HEIGHT: 64 IN

## 2020-06-24 DIAGNOSIS — R23.2 HOT FLASHES: Primary | ICD-10-CM

## 2020-06-24 DIAGNOSIS — E78.2 MIXED HYPERLIPIDEMIA: ICD-10-CM

## 2020-06-24 DIAGNOSIS — Z11.4 SCREENING FOR HIV (HUMAN IMMUNODEFICIENCY VIRUS): ICD-10-CM

## 2020-06-24 PROCEDURE — 1036F TOBACCO NON-USER: CPT | Performed by: FAMILY MEDICINE

## 2020-06-24 PROCEDURE — 99214 OFFICE O/P EST MOD 30 MIN: CPT | Performed by: FAMILY MEDICINE

## 2020-06-24 PROCEDURE — 3008F BODY MASS INDEX DOCD: CPT | Performed by: FAMILY MEDICINE

## 2020-06-24 RX ORDER — PAROXETINE 10 MG/1
10 TABLET, FILM COATED ORAL DAILY
Qty: 90 TABLET | Refills: 0 | Status: SHIPPED | OUTPATIENT
Start: 2020-06-24 | End: 2020-08-28

## 2020-07-03 DIAGNOSIS — G47.00 INSOMNIA, UNSPECIFIED TYPE: ICD-10-CM

## 2020-07-07 RX ORDER — SUVOREXANT 15 MG/1
TABLET, FILM COATED ORAL
Qty: 30 TABLET | Refills: 0 | Status: SHIPPED | OUTPATIENT
Start: 2020-07-07 | End: 2020-08-28 | Stop reason: SDUPTHER

## 2020-07-22 ENCOUNTER — OFFICE VISIT (OUTPATIENT)
Dept: FAMILY MEDICINE CLINIC | Facility: CLINIC | Age: 55
End: 2020-07-22
Payer: COMMERCIAL

## 2020-07-22 VITALS
RESPIRATION RATE: 18 BRPM | SYSTOLIC BLOOD PRESSURE: 114 MMHG | HEART RATE: 60 BPM | TEMPERATURE: 98.5 F | WEIGHT: 151 LBS | BODY MASS INDEX: 25.78 KG/M2 | DIASTOLIC BLOOD PRESSURE: 66 MMHG | HEIGHT: 64 IN

## 2020-07-22 DIAGNOSIS — Z91.030 BEE STING ALLERGY: Primary | ICD-10-CM

## 2020-07-22 PROCEDURE — 1036F TOBACCO NON-USER: CPT | Performed by: NURSE PRACTITIONER

## 2020-07-22 PROCEDURE — 3008F BODY MASS INDEX DOCD: CPT | Performed by: NURSE PRACTITIONER

## 2020-07-22 PROCEDURE — 99213 OFFICE O/P EST LOW 20 MIN: CPT | Performed by: NURSE PRACTITIONER

## 2020-07-22 RX ORDER — PREDNISONE 10 MG/1
TABLET ORAL
Qty: 15 TABLET | Refills: 0 | Status: SHIPPED | OUTPATIENT
Start: 2020-07-22 | End: 2020-07-27

## 2020-07-22 NOTE — PROGRESS NOTES
Assessment and Plan:    Problem List Items Addressed This Visit     None      Visit Diagnoses     Bee sting allergy    -  Primary    Relevant Medications    predniSONE 10 mg tablet                 Diagnoses and all orders for this visit:    Bee sting allergy  -     predniSONE 10 mg tablet; Take 5 tablets (50 mg total) by mouth daily for 1 day, THEN 4 tablets (40 mg total) daily for 1 day, THEN 3 tablets (30 mg total) daily for 1 day, THEN 2 tablets (20 mg total) daily for 1 day, THEN 1 tablet (10 mg total) daily for 1 day  Subjective:      Patient ID: Kayleen Guzmán is a 49 y o  female  CC:    Chief Complaint   Patient presents with    Insect Bite     wasp bite       HPI:    Patient reports she was stung by a bee on Monday  Since then she has been taking Benadryl and using topical cream without much relief  She states that the area is swelling more and more each day and there is some pain to the area as well  Patient states she typically does have reaction to bees but not an anaphylactic reaction  The following portions of the patient's history were reviewed and updated as appropriate: allergies, current medications, past family history, past medical history, past social history, past surgical history and problem list       Review of Systems   Constitutional: Negative for fever  Respiratory: Negative for cough, chest tightness and shortness of breath  Cardiovascular: Negative for chest pain  Skin:        As noted in the HPI   Allergic/Immunologic: Positive for environmental allergies  Data to review:       Objective:    Vitals:    07/22/20 1304   BP: 114/66   BP Location: Left arm   Patient Position: Sitting   Cuff Size: Standard   Pulse: 60   Resp: 18   Temp: 98 5 °F (36 9 °C)   Weight: 68 5 kg (151 lb)   Height: 5' 4 25" (1 632 m)        Physical Exam   Constitutional: She is oriented to person, place, and time  She appears well-developed and well-nourished     HENT:   Head: Normocephalic and atraumatic  Cardiovascular: Normal rate, regular rhythm and normal heart sounds  Pulmonary/Chest: Effort normal and breath sounds normal    Neurological: She is alert and oriented to person, place, and time  Skin: There is erythema  Patient has localized reaction to the left pinky finger  There is edema and redness area is also warm to touch  Nursing note and vitals reviewed

## 2020-07-27 ENCOUNTER — APPOINTMENT (OUTPATIENT)
Dept: RADIOLOGY | Facility: MEDICAL CENTER | Age: 55
End: 2020-07-27
Payer: COMMERCIAL

## 2020-07-27 ENCOUNTER — OFFICE VISIT (OUTPATIENT)
Dept: OBGYN CLINIC | Facility: MEDICAL CENTER | Age: 55
End: 2020-07-27
Payer: COMMERCIAL

## 2020-07-27 VITALS
BODY MASS INDEX: 25.72 KG/M2 | TEMPERATURE: 98.3 F | DIASTOLIC BLOOD PRESSURE: 80 MMHG | WEIGHT: 151 LBS | SYSTOLIC BLOOD PRESSURE: 128 MMHG | HEART RATE: 64 BPM

## 2020-07-27 DIAGNOSIS — R26.9 GAIT DISTURBANCE: ICD-10-CM

## 2020-07-27 DIAGNOSIS — Z96.643 HISTORY OF BILATERAL HIP REPLACEMENTS: ICD-10-CM

## 2020-07-27 DIAGNOSIS — Z96.643 HISTORY OF BILATERAL HIP REPLACEMENTS: Primary | ICD-10-CM

## 2020-07-27 DIAGNOSIS — R26.89 BALANCE PROBLEMS: ICD-10-CM

## 2020-07-27 PROCEDURE — 99214 OFFICE O/P EST MOD 30 MIN: CPT | Performed by: ORTHOPAEDIC SURGERY

## 2020-07-27 PROCEDURE — 73521 X-RAY EXAM HIPS BI 2 VIEWS: CPT

## 2020-07-27 PROCEDURE — 1036F TOBACCO NON-USER: CPT | Performed by: ORTHOPAEDIC SURGERY

## 2020-07-27 NOTE — PROGRESS NOTES
Assessment/Plan     1  History of bilateral hip replacements    2  Gait disturbance    3  Balance problems      Orders Placed This Encounter   Procedures    XR hips bilateral 2 vw w pelvis if performed    Ambulatory referral to Physical Therapy     · X-rays taken and reviewed, physical exam performed  · Both total hip arthroplasties are well aligned without any appreciable wear  · Recommendation is to undergo a short course of physical therapy for stair training  · Advised to follow-up with her PCP if she continues to have her balance or gait complaints  Return for re-check with x-rays of both hips in 2-3 years  I answered all of the patient's questions during the visit and provided education of the patient's condition during the visit  The patient verbalized understanding of the information given and agrees with the plan  This note was dictated using Revolver software  It may contain errors including improperly dictated words  Please contact physician directly for any questions  History of Present Illness   Chief complaint:   Chief Complaint   Patient presents with    Left Hip - Pain    Right Hip - Pain       HPI: Nikki Tolentino is a 49 y o  female that presents to [facility] care with a history of right and left total hip arthroplasties  She had both hips replaced by Dr Zafar Poe long  Her right GEOVANY performed in 2010 with revision 8 months later due to her hip "locking" and her left GEOVANY was performed in 2016  She denies any pain  She is able to do her normal daily activities without any hip complaints  To take any medications  She finds that she tends to trip over her feet at times, especially when hiking, over the past 4 years  She has to be very aware when she hikes or goes to take a step-up  She complains of balance issues  She denies any groin pain  She fell down stairs last year and struck her head on a door and her primary care physician is well aware of this incident     She does have intermittent chronic low back and neck pain for years  Denies any numbness or tingling down either lower extremity  No neck or low back surgery  Denies any bowel or bladder issues  ROS:    See HPI for musculoskeletal review     All other systems reviewed are negative     Historical Information   Past Medical History:   Diagnosis Date    Acid reflux     Allergic reaction to dye     Resolved - [id number]    Aneurysm of thoracic aorta (HCC)     last assess 2017    Anxiety     Arthritis     Atrophic vaginitis     Benign familial tremor     Last assessed - [id number]    Cervical back pain with evidence of disc disease     Chronic otitis media of right ear     Last assessed - [id number]    Contact dermatitis     Last assessed - [id number]    Dermatitis     Disturbance of smell     Last assessed - 89Pmo7405    DJD (degenerative joint disease)     Endometriosis     Last assessed - [id number]    Esophageal reflux     Last assessed - [id number]    Folliculitis     Last assessed - 65Jyj3897    Headache     Herpes simplex     High cholesterol     Hyperlipidemia     Last assessed - [id number]    Lateral epicondylitis of left elbow     Last assessed - 17NNU0176    Lumbar back pain     Myofascial pain syndrome     Neoplasm of skin     Last assessed - 05Pcy9916    Osteoarthritis     PONV (postoperative nausea and vomiting)     severe    Postmenopausal atrophic vaginitis     Last assessed - 99EDY8443    Prophylactic antibiotic     Last assessed - 42FSZ2873    Rotator cuff disorder     Sleep apnea     mild no cpap    Taste impairment     taste disturbances - Last assessed - 24Mgw2867    Tremor, hereditary, benign     Wound, open, finger     Last assessed - 55BLW0035     Past Surgical History:   Procedure Laterality Date    COLONOSCOPY      DIAGNOSTIC LAPAROSCOPY      LAPAROSCOPIC ENDOMETRIOSIS FULGURATION      Laparoscop excis endometriotic tissue uterosacral ligaments    RI COLONOSCOPY FLX DX W/COLLJ SPEC WHEN PFRMD N/A 12/6/2018    Procedure: COLONOSCOPY;  Surgeon: Nahomy Chirinos DO;  Location: [facility] GI [facility]; Service: Gastroenterology    NE ESOPHAGOGASTRODUODENOSCOPY TRANSORAL DIAGNOSTIC N/A 12/6/2018    Procedure: ESOPHAGOGASTRODUODENOSCOPY (EGD); Surgeon: Nahomy Chirinos DO;  Location: [facility];   Service: Gastroenterology    NE TOTAL HIP ARTHROPLASTY Left 5/16/2016    Procedure: ANTERIOR TOTAL HIP ARTHROPLASTY ;  Surgeon: Anup Omalley MD;  [facility];  Service: Orthopedics    REVISION TOTAL HIP ARTHROPLASTY  06/15/2011    TOTAL HIP ARTHROPLASTY Right     TOTAL HIP ARTHROPLASTY  10/20/2010    UPPER GASTROINTESTINAL ENDOSCOPY       Social History   Social History     Substance and Sexual Activity   Alcohol Use Yes    Frequency: Monthly or less    Drinks per session: 1 or 2    Binge frequency: Never    Comment: rare     Social History     Substance and Sexual Activity   Drug Use No    Comment: Denied history of drug use     Social History     Tobacco Use   Smoking Status Never Smoker   Smokeless Tobacco Never Used     Family History:   Family History   Problem Relation Age of Onset    Hypertension Mother     Stroke Mother     Atrial fibrillation Mother     Diverticulitis Mother         of colon    Esophageal cancer Father 80    Arthritis Father     Stroke Father         Stroke syndrome    Arthritis Brother     Other Brother         Esophageal reflux    Other Family         Back pain    Breast cancer Family     Colon cancer Family     Diabetes Family     No Known Problems Sister     No Known Problems Maternal Grandmother     No Known Problems Maternal Grandfather     Colon cancer Paternal Grandmother 66    No Known Problems Paternal Grandfather     No Known Problems Sister     No Known Problems Maternal Aunt     Breast cancer Maternal Aunt 48    Breast cancer Other         age unknown    Breast cancer Other         unknown age   [facility] Prostate cancer Paternal Uncle [de-identified]    Breast cancer Maternal Uncle 72       Current Outpatient Medications on File Prior to Visit   Medication Sig Dispense Refill    ALPRAZolam (XANAX) 0 25 mg tablet Take 1 tablet (0 25 mg total) by mouth daily at bedtime as needed for anxiety 30 tablet 0    ascorbic acid (VITAMIN C) 500 mg tablet Take 500 mg by mouth daily       BELSOMRA 15 MG TABS TAKE ONE TABLET BY MOUTH DAILY AT BEDTIME 30 tablet 0    Calcium-Vitamin D (CALTRATE 600 PLUS-VIT D PO) Take 600 mg by mouth daily       clindamycin (CLEOCIN T) 1 % external solution apply topically to affected area AS NEEDED FOR FLARES OF THE SCALP  0    dicyclomine (BENTYL) 20 mg tablet Take 1 tablet (20 mg total) by mouth every 6 (six) hours 60 tablet 2    estradiol (VAGIFEM, YUVAFEM) 10 MCG TABS vaginal tablet 1 tablet vaginally q hs x 14 days then twice weekly 40 tablet 3    glucosamine-chondroitin 500-400 MG tablet Take 1 tablet by mouth daily       methocarbamol (ROBAXIN) 750 mg tablet Take 1 tablet (750 mg total) by mouth 2 (two) times a day as needed for muscle spasms 180 tablet 1    Multiple Vitamins-Minerals (MULTIVITAMIN WITH MINERALS) tablet Take 1 tablet by mouth daily   omeprazole (PriLOSEC) 20 mg delayed release capsule Take 1 capsule (20 mg total) by mouth 2 (two) times a day 60 capsule 5    orphenadrine (NORFLEX) 100 mg tablet Take 1 tablet (100 mg total) by mouth daily as needed for muscle spasms 30 tablet 1    PARoxetine (Paxil) 10 mg tablet Take 1 tablet (10 mg total) by mouth daily 90 tablet 0    predniSONE 10 mg tablet Take 5 tablets (50 mg total) by mouth daily for 1 day, THEN 4 tablets (40 mg total) daily for 1 day, THEN 3 tablets (30 mg total) daily for 1 day, THEN 2 tablets (20 mg total) daily for 1 day, THEN 1 tablet (10 mg total) daily for 1 day   15 tablet 0    traMADol (ULTRAM) 50 mg tablet Take 1 tablet (50 mg total) by mouth every 6 (six) hours as needed for severe pain 20 tablet 0    TURMERIC PO Take 1 capsule by mouth daily       valACYclovir (VALTREX) 1,000 mg tablet 2 tabs at onset of symptoms, and 2 tabs 12 hours later 4 tablet 5    zinc gluconate 50 mg tablet Take 50 mg by mouth daily  No current facility-administered medications on file prior to visit  Allergies   Allergen Reactions    Iodinated Diagnostic Agents Swelling and Eye Swelling     IVP dye specifically; swollen eye lid shut    Morphine Vomiting     Reaction Date: [id number];     Morphine And Related      DENIES ALLERGY RELATES N/V TO ANESTHESIA    Vicodin [Hydrocodone-Acetaminophen]      No allergy to tylenol   Does have N/V to vicoden    Effexor [Venlafaxine] Arthralgia    Gentamicin Rash     Reaction Date: [id number];        Objective   Vitals: Blood pressure 128/80, pulse 64, temperature 98 3 °F (36 8 °C), weight 68 5 kg (151 lb)  ,Body mass index is 25 72 kg/m²  PE:  AAOx 3  WDWN  Hearing intact, no drainage from eyes  Regular rate  no audible wheezing  no abdominal distension  LE compartments soft, skin intact    bilateral hip:   No dislocation at either hip  Healed posterior incision at her right hip, healed anterior incision left hip  Neg  [facility]  ROM: Right = Flexion 0-110, IR 0-30, ER 0-40           Left = Flexion 0-110, IR 0-30, ER 0-45  She has resolving ecchymosis right lower leg area anteirorly  Neg  Butch Test  Neg  Impingement test  No TTP over greater trochanter  No pain with resistance straight leg raise bilaterally  Abduction: 5/5  Neg  Giuseppe's test  No TTP over SIJ    bilateralLE:    Sensation grossly intact L4, L5, and S1 distally  Palpable  pulse  5/5 EHL/AT/GS intact    Back:    No TTP over lumbar spinous processes, paraspinal musculature  SLR: Neg  Imaging Studies: I have personally reviewed pertinent films in PACS  bilateral hips and pelvis:  Right and left total hip arthroplasties in excellent position and alignment    No acute changes  Two cerclage wires at her left hip in unchanged position  Scribe Attestation    I,:   Greer Mcallister am acting as a scribe while in the presence of the attending physician :        I,:   Delta Hernandez, DO personally performed the services described in this documentation    as scribed in my presence :            Portions of the record may have been created with voice recognition software  Occasional wrong word or "sound a like" substitutions may have occurred due to the inherent limitations of voice recognition software  Read the chart carefully and recognize, using context, where substitutions have occurred

## 2020-08-24 ENCOUNTER — HOSPITAL ENCOUNTER (OUTPATIENT)
Dept: RADIOLOGY | Facility: HOSPITAL | Age: 55
Discharge: HOME | End: 2020-08-24
Attending: ORTHOPAEDIC SURGERY
Payer: COMMERCIAL

## 2020-08-24 ENCOUNTER — OFFICE VISIT (OUTPATIENT)
Dept: OBGYN CLINIC | Facility: HOSPITAL | Age: 55
End: 2020-08-24
Payer: COMMERCIAL

## 2020-08-24 VITALS
BODY MASS INDEX: 26.12 KG/M2 | HEART RATE: 51 BPM | DIASTOLIC BLOOD PRESSURE: 73 MMHG | SYSTOLIC BLOOD PRESSURE: 125 MMHG | WEIGHT: 153 LBS | HEIGHT: 64 IN

## 2020-08-24 DIAGNOSIS — M24.811 INTERNAL DERANGEMENT OF SHOULDER, RIGHT: ICD-10-CM

## 2020-08-24 DIAGNOSIS — M25.511 RIGHT SHOULDER PAIN, UNSPECIFIED CHRONICITY: ICD-10-CM

## 2020-08-24 DIAGNOSIS — M25.511 RIGHT SHOULDER PAIN, UNSPECIFIED CHRONICITY: Primary | ICD-10-CM

## 2020-08-24 DIAGNOSIS — M75.81 TENDINITIS OF RIGHT ROTATOR CUFF: ICD-10-CM

## 2020-08-24 PROCEDURE — 3008F BODY MASS INDEX DOCD: CPT | Performed by: ORTHOPAEDIC SURGERY

## 2020-08-24 PROCEDURE — 73030 X-RAY EXAM OF SHOULDER: CPT

## 2020-08-24 PROCEDURE — 1036F TOBACCO NON-USER: CPT | Performed by: ORTHOPAEDIC SURGERY

## 2020-08-24 PROCEDURE — 99214 OFFICE O/P EST MOD 30 MIN: CPT | Performed by: ORTHOPAEDIC SURGERY

## 2020-08-24 NOTE — PATIENT INSTRUCTIONS
Recommend further evaluation with MRI given the history of supraspinatus tear and shoulder pain weakness becoming worse acutely one month ago  May continue current regimen at this time  Follow-up after MRI to discuss options

## 2020-08-24 NOTE — PROGRESS NOTES
I personally examined the patient and reviewed the history provided  I agree with the note and the assessment and plan by Dr Melvin Dia  Assessment:    Right shoulder progression of pain with high-grade partial near full-thickness supraspinatus tendon tear    Plan:    Given her weakness on exam and her progression of her symptoms I do feel at this time she is indicated for a new MRI of her right shoulder to better assess whether not there has been progression from partial tear to full-thickness tear of her right supraspinatus  We will review the results of the MRI and discuss treatment options based on those results          Assessment  Diagnoses and all orders for this visit:    Right shoulder pain, unspecified chronicity  -     XR shoulder 2+ vw right; Future  -     MRI shoulder right wo contrast; Future    Internal derangement of shoulder, right  -     MRI shoulder right wo contrast; Future    Tendinitis of right rotator cuff  -     MRI shoulder right wo contrast; Future        Discussion and Plan:    Discussed with the patient that due to her right shoulder pain and weakness became acutely worse one month ago, in the setting of a almost complete tear of the right supraspinatus, an MRI is indicated at this time  She has been performing her home shoulder exercises weekly for the past year  She will follow-up after the MRI to discuss options in person  Subjective:   Patient ID: Js Cornell is a 49 y o  female      HPI    Patient is a pleasant 48 yo female who presents in follow-up  In 2015 she had a right shoulder MRI which showed a near full thickness tear of the supraspinatus  She was seen here over one year ago for LEFT shoulder pain  She had an MRI at that time, which revealed left supraspinatus tendinosis  She underwent cortisone injections for the left shoulder       Reports recurrent right shoulder pain that became acutely worse one month ago, poorly localized right "shoulder blade" and shoulder pain  Denies any trauma  Pain quality is a "burning" pain  She has tried ice, occasional NSAID, muscle relaxer, tramadol, Tylenol  She feels it is staying about the same and home exercises are also no longer helping  Pain is 7-8/10 at its worst  Overuse makes the pain worse  Also reports instability with certain movements  The following portions of the patient's history were reviewed and updated as appropriate: allergies, current medications, past family history, past medical history, past social history, past surgical history and problem list     Review of Systems   Constitutional: Negative for chills, fever and unexpected weight change  HENT: Negative for hearing loss, nosebleeds and sore throat  Eyes: Negative for pain, redness and visual disturbance  Respiratory: Negative for cough, shortness of breath and wheezing  Cardiovascular: Negative for chest pain, palpitations and leg swelling  Gastrointestinal: Negative for abdominal pain, nausea and vomiting  Endocrine: Negative for polydipsia and polyuria  Genitourinary: Negative for dysuria and hematuria  Skin: Negative for rash and wound  Neurological: Negative for dizziness, numbness and headaches  Psychiatric/Behavioral: Negative for decreased concentration, dysphoric mood and suicidal ideas  The patient is not nervous/anxious          Objective:  /73   Pulse (!) 51   Ht 5' 4 25" (1 632 m)   Wt 69 4 kg (153 lb)   BMI 26 06 kg/m²       Ortho Exam    RIGHT SHOULDER:  Erythema: no  Swelling: no  Increased Warmth: no    Tenderness:     ROM  Touchdown sign: intact  Appley Scratch Test: assymmetric  Passive: symmetric    Strength  Abduction: 4/5  ER: 5/5  IR: 5/5    Drop-Arm: negative  Emptycan: negative  Belly Press: negative  Lift-off Test:    Caro: POSITIVE  Neer: POSITIVE  Cross-Arm: negative  Speeds: negative    Internal Impingement: POSITIVE  (crank position pain)    Canadian's Test: NEGATIVE  (FF 90, abd 15, resist thumbs up-, resist thumbs down+)    Apprehension: POSITIVE      LEFT SHOULDER:  Strength  Abduction: 5/5  ER: 5/5  IR: 5/5    ROM  Touchdown sign: intact  Appley Scratch Test: symmetric  Passive: symmetric    Empty can: negative      Physical Exam      I have personally reviewed pertinent films in PACS and my interpretation is as follows  Right shoulder XR--AC joint OA  No acute osseus abnormalities

## 2020-08-28 DIAGNOSIS — G47.00 INSOMNIA, UNSPECIFIED TYPE: ICD-10-CM

## 2020-08-28 RX ORDER — SUVOREXANT 15 MG/1
1 TABLET, FILM COATED ORAL
Qty: 30 TABLET | Refills: 0 | Status: SHIPPED | OUTPATIENT
Start: 2020-08-28 | End: 2020-10-02 | Stop reason: SDUPTHER

## 2020-08-28 NOTE — TELEPHONE ENCOUNTER
Medication: Belsomra 15 mg   Last refilled: 7/7/20  Last Office Visit: 6/24/20  Next Office Visit: N/A   Pharmacy:     [facility]NI: patient informed she stopped paxil due to a 5-10lbs weight gain in 60 days

## 2020-08-30 ENCOUNTER — HOSPITAL ENCOUNTER (OUTPATIENT)
Dept: MRI IMAGING | Facility: HOSPITAL | Age: 55
Discharge: HOME | End: 2020-08-30
Payer: COMMERCIAL

## 2020-08-30 DIAGNOSIS — M25.511 RIGHT SHOULDER PAIN, UNSPECIFIED CHRONICITY: ICD-10-CM

## 2020-08-30 DIAGNOSIS — M24.811 INTERNAL DERANGEMENT OF SHOULDER, RIGHT: ICD-10-CM

## 2020-08-30 DIAGNOSIS — M75.81 TENDINITIS OF RIGHT ROTATOR CUFF: ICD-10-CM

## 2020-08-30 PROCEDURE — G1004 CDSM NDSC: HCPCS

## 2020-08-30 PROCEDURE — 73221 MRI JOINT UPR EXTREM W/O DYE: CPT

## 2020-09-14 ENCOUNTER — OFFICE VISIT (OUTPATIENT)
Dept: OBGYN CLINIC | Facility: OTHER | Age: 55
End: 2020-09-14
Payer: COMMERCIAL

## 2020-09-14 VITALS
HEART RATE: 52 BPM | HEIGHT: 64 IN | DIASTOLIC BLOOD PRESSURE: 65 MMHG | BODY MASS INDEX: 26.29 KG/M2 | WEIGHT: 154 LBS | SYSTOLIC BLOOD PRESSURE: 116 MMHG

## 2020-09-14 DIAGNOSIS — M75.51 SUBACROMIAL BURSITIS OF RIGHT SHOULDER JOINT: ICD-10-CM

## 2020-09-14 DIAGNOSIS — M25.521 PAIN IN RIGHT ELBOW: ICD-10-CM

## 2020-09-14 DIAGNOSIS — M19.011 PRIMARY OSTEOARTHRITIS OF RIGHT SHOULDER: Primary | ICD-10-CM

## 2020-09-14 PROCEDURE — 20610 DRAIN/INJ JOINT/BURSA W/O US: CPT | Performed by: ORTHOPAEDIC SURGERY

## 2020-09-14 PROCEDURE — 99214 OFFICE O/P EST MOD 30 MIN: CPT | Performed by: ORTHOPAEDIC SURGERY

## 2020-09-14 RX ORDER — BETAMETHASONE SODIUM PHOSPHATE AND BETAMETHASONE ACETATE 3; 3 MG/ML; MG/ML
6 INJECTION, SUSPENSION INTRA-ARTICULAR; INTRALESIONAL; INTRAMUSCULAR; SOFT TISSUE
Status: COMPLETED | OUTPATIENT
Start: 2020-09-14 | End: 2020-09-14

## 2020-09-14 RX ORDER — BUPIVACAINE HYDROCHLORIDE 2.5 MG/ML
2 INJECTION, SOLUTION INFILTRATION; PERINEURAL
Status: COMPLETED | OUTPATIENT
Start: 2020-09-14 | End: 2020-09-14

## 2020-09-14 RX ADMIN — BUPIVACAINE HYDROCHLORIDE 2 ML: 2.5 INJECTION, SOLUTION INFILTRATION; PERINEURAL at 08:53

## 2020-09-14 RX ADMIN — BETAMETHASONE SODIUM PHOSPHATE AND BETAMETHASONE ACETATE 6 MG: 3; 3 INJECTION, SUSPENSION INTRA-ARTICULAR; INTRALESIONAL; INTRAMUSCULAR; SOFT TISSUE at 08:53

## 2020-09-14 NOTE — PATIENT INSTRUCTIONS

## 2020-09-14 NOTE — PROGRESS NOTES
Assessment  Diagnoses and all orders for this visit:    Primary osteoarthritis of right shoulder    Subacromial bursitis of right shoulder joint        Discussion and Plan:    · Explained to the patient that it appears her partial rotator cuff tear seen in 2015 has slightly healed in this most recent study as the tear is less prominent  It is in my opinion that the main compliant of the patient is her glenohumeral joint osteoarthritis  She was provided with a cortisone injection today into her glenohumeral joint  This is documented approprietly below  · May perform activities as tolerated  Avoid painful maneuvers  · If non operative treatments become ineffective then a surgical procedure in the form of a total shoulder arthroplasty would be warranted  She understood and all questions were answered  · A referral will also be placed to one of our elbow, hand and wrist surgeons due to compliant of possible ulnar neuritis  · Follow up on an as needed basis    Subjective:   Patient ID: India Fabian is a 49 y o  female      HPI  48 y/o female who presents today for a follow up visit for her right shoulder as well as to discuss MRI results  Today, patient notes continued intermittent pain about the right shoulder and scapula area  She describes the pain as intermittent "aching, burning" sensation  Patient has been performing a home exercise program and taking OTC anti-inflammatories p r n  for pain relief without benefit  She reports that repetitive overhead and overuse motions increasing symptoms  She is also reporting a new onset of right elbow pain  She states that a few months ago she struck her right elbow while trying to pull something out of a cabinet  She describes her symptoms as a electrical shock from the elbow to the wrist   This happens intermittently and is most common with range of motion of the elbow  Denies fevers or chills        The following portions of the patient's history were reviewed and updated as appropriate: allergies, current medications, past family history, past medical history, past social history, past surgical history and problem list     Review of Systems   Constitutional: Negative for chills, fever and unexpected weight change  HENT: Negative for hearing loss, nosebleeds and sore throat  Eyes: Negative for pain, redness and visual disturbance  Respiratory: Negative for cough, shortness of breath and wheezing  Cardiovascular: Negative for chest pain, palpitations and leg swelling  Gastrointestinal: Negative for abdominal distention, nausea and vomiting  Endocrine: Negative for polydipsia and polyuria  Genitourinary: Negative for dysuria and hematuria  Skin: Negative for rash and wound  Neurological: Negative for dizziness, numbness and headaches  Psychiatric/Behavioral: Negative for decreased concentration and suicidal ideas  Objective:  /65   Pulse (!) 52   Ht 5' 4 25" (1 632 m)   Wt 69 9 kg (154 lb)   BMI 26 23 kg/m²       Right Shoulder Exam     Tenderness   The patient is experiencing no tenderness  Range of Motion   External rotation: 50   Forward flexion: 160   Internal rotation 0 degrees: Lumbar     Muscle Strength   Abduction: 4/5   External rotation: 5/5     Tests   Caro test: positive    Other   Erythema: absent  Sensation: normal  Pulse: present              Physical Exam  Constitutional:       Appearance: She is well-developed  Eyes:      Pupils: Pupils are equal, round, and reactive to light  Pulmonary:      Effort: Pulmonary effort is normal       Breath sounds: Normal breath sounds  Skin:     General: Skin is warm and dry  Neurological:      Mental Status: She is alert and oriented to person, place, and time  Psychiatric:         Behavior: Behavior normal          Thought Content:  Thought content normal          Judgment: Judgment normal        Large joint arthrocentesis: R glenohumeral  Date/Time: 9/14/2020 8:53 AM  Consent given by: patient  Site marked: site marked  Timeout: Immediately prior to procedure a time out was called to verify the correct patient, procedure, equipment, support staff and site/side marked as required   Supporting Documentation  Indications: pain and diagnostic evaluation   Procedure Details  Location: shoulder - R glenohumeral  Preparation: Patient was prepped and draped in the usual sterile fashion  Needle size: 22 G  Ultrasound guidance: no  Approach: posterior  Medications administered: 2 mL bupivacaine 0 25 %; 6 mg betamethasone acetate-betamethasone sodium phosphate 6 (3-3) mg/mL    Patient tolerance: patient tolerated the procedure well with no immediate complications  Dressing:  Sterile dressing applied          I have personally reviewed pertinent films in PACS and my interpretation is as follows  MRI Right Shoulder: Low grade undersurface tear of posterior fibers of supraspinatus, less prominent when compared with prior exam in 2015  No full thickness rotator cuff tear  Moderate AC joint and Mild glenohumeral osteoarthritis       Scribe Attestation    I,:   Ger Montana am acting as a scribe while in the presence of the attending physician :        I,:   Vincent Alston MD personally performed the services described in this documentation    as scribed in my presence : 2018 20:19

## 2020-10-01 ENCOUNTER — OFFICE VISIT (OUTPATIENT)
Dept: OBGYN CLINIC | Facility: CLINIC | Age: 55
End: 2020-10-01
Payer: COMMERCIAL

## 2020-10-01 VITALS
HEIGHT: 64 IN | WEIGHT: 155 LBS | TEMPERATURE: 97.7 F | HEART RATE: 55 BPM | DIASTOLIC BLOOD PRESSURE: 90 MMHG | SYSTOLIC BLOOD PRESSURE: 144 MMHG | BODY MASS INDEX: 26.46 KG/M2

## 2020-10-01 DIAGNOSIS — M54.12 RADICULOPATHY, CERVICAL REGION: Primary | ICD-10-CM

## 2020-10-01 DIAGNOSIS — M25.521 PAIN IN RIGHT ELBOW: ICD-10-CM

## 2020-10-01 PROCEDURE — 99214 OFFICE O/P EST MOD 30 MIN: CPT | Performed by: SURGERY

## 2020-10-01 RX ORDER — METHYLPREDNISOLONE 4 MG/1
TABLET ORAL
Qty: 1 EACH | Refills: 0 | Status: SHIPPED | OUTPATIENT
Start: 2020-10-01 | End: 2020-10-16 | Stop reason: ALTCHOICE

## 2020-10-02 DIAGNOSIS — G47.00 INSOMNIA, UNSPECIFIED TYPE: ICD-10-CM

## 2020-10-02 RX ORDER — SUVOREXANT 15 MG/1
1 TABLET, FILM COATED ORAL
Qty: 30 TABLET | Refills: 0 | Status: SHIPPED | OUTPATIENT
Start: 2020-10-02 | End: 2020-11-13 | Stop reason: SDUPTHER

## 2020-10-06 ENCOUNTER — EVALUATION (OUTPATIENT)
Dept: PHYSICAL THERAPY | Facility: CLINIC | Age: 55
End: 2020-10-06
Payer: COMMERCIAL

## 2020-10-06 DIAGNOSIS — R26.9 GAIT DISTURBANCE: ICD-10-CM

## 2020-10-06 DIAGNOSIS — M54.12 CERVICAL RADICULOPATHY: Primary | ICD-10-CM

## 2020-10-06 PROCEDURE — 97110 THERAPEUTIC EXERCISES: CPT | Performed by: PHYSICAL THERAPIST

## 2020-10-06 PROCEDURE — 97162 PT EVAL MOD COMPLEX 30 MIN: CPT | Performed by: PHYSICAL THERAPIST

## 2020-10-08 ENCOUNTER — OFFICE VISIT (OUTPATIENT)
Dept: PHYSICAL THERAPY | Facility: CLINIC | Age: 55
End: 2020-10-08
Payer: COMMERCIAL

## 2020-10-08 DIAGNOSIS — M54.12 CERVICAL RADICULOPATHY: ICD-10-CM

## 2020-10-08 DIAGNOSIS — R26.9 GAIT DISTURBANCE: Primary | ICD-10-CM

## 2020-10-08 PROCEDURE — 97012 MECHANICAL TRACTION THERAPY: CPT

## 2020-10-08 PROCEDURE — 97112 NEUROMUSCULAR REEDUCATION: CPT

## 2020-10-08 PROCEDURE — 97140 MANUAL THERAPY 1/> REGIONS: CPT

## 2020-10-08 PROCEDURE — 97116 GAIT TRAINING THERAPY: CPT

## 2020-10-12 ENCOUNTER — OFFICE VISIT (OUTPATIENT)
Dept: PHYSICAL THERAPY | Facility: CLINIC | Age: 55
End: 2020-10-12
Payer: COMMERCIAL

## 2020-10-12 DIAGNOSIS — M54.12 CERVICAL RADICULOPATHY: ICD-10-CM

## 2020-10-12 DIAGNOSIS — R26.9 GAIT DISTURBANCE: Primary | ICD-10-CM

## 2020-10-12 DIAGNOSIS — M54.12 RADICULOPATHY, CERVICAL REGION: ICD-10-CM

## 2020-10-12 PROCEDURE — 97012 MECHANICAL TRACTION THERAPY: CPT

## 2020-10-12 PROCEDURE — 97140 MANUAL THERAPY 1/> REGIONS: CPT

## 2020-10-12 PROCEDURE — 97112 NEUROMUSCULAR REEDUCATION: CPT

## 2020-10-14 ENCOUNTER — LAB (OUTPATIENT)
Dept: LAB | Facility: CLINIC | Age: 55
End: 2020-10-14
Payer: COMMERCIAL

## 2020-10-14 DIAGNOSIS — R23.2 HOT FLASHES: ICD-10-CM

## 2020-10-14 DIAGNOSIS — E78.2 MIXED HYPERLIPIDEMIA: ICD-10-CM

## 2020-10-14 LAB
ALBUMIN SERPL BCP-MCNC: 4.1 G/DL (ref 3.5–5)
ALP SERPL-CCNC: 65 U/L (ref 46–116)
ALT SERPL W P-5'-P-CCNC: 21 U/L (ref 12–78)
ANION GAP SERPL CALCULATED.3IONS-SCNC: 4 MMOL/L (ref 4–13)
AST SERPL W P-5'-P-CCNC: 10 U/L (ref 5–45)
BASOPHILS # BLD AUTO: 0.05 THOUSANDS/ΜL (ref 0–0.1)
BASOPHILS NFR BLD AUTO: 1 % (ref 0–1)
BILIRUB SERPL-MCNC: 0.4 MG/DL (ref 0.2–1)
BUN SERPL-MCNC: 18 MG/DL (ref 5–25)
CALCIUM SERPL-MCNC: 9.2 MG/DL (ref 8.3–10.1)
CHLORIDE SERPL-SCNC: 105 MMOL/L (ref 100–108)
CHOLEST SERPL-MCNC: 261 MG/DL (ref 50–200)
CO2 SERPL-SCNC: 32 MMOL/L (ref 21–32)
CREAT SERPL-MCNC: 0.92 MG/DL (ref 0.6–1.3)
EOSINOPHIL # BLD AUTO: 0.18 THOUSAND/ΜL (ref 0–0.61)
EOSINOPHIL NFR BLD AUTO: 3 % (ref 0–6)
ERYTHROCYTE [DISTWIDTH] IN BLOOD BY AUTOMATED COUNT: 12.2 % (ref 11.6–15.1)
GFR SERPL CREATININE-BSD FRML MDRD: 71 ML/MIN/1.73SQ M
GLUCOSE P FAST SERPL-MCNC: 83 MG/DL (ref 65–99)
HCT VFR BLD AUTO: 40 % (ref 34.8–46.1)
HDLC SERPL-MCNC: 71 MG/DL
HGB BLD-MCNC: 13 G/DL (ref 11.5–15.4)
IMM GRANULOCYTES # BLD AUTO: 0.02 THOUSAND/UL (ref 0–0.2)
IMM GRANULOCYTES NFR BLD AUTO: 0 % (ref 0–2)
LDLC SERPL CALC-MCNC: 157 MG/DL (ref 0–100)
LYMPHOCYTES # BLD AUTO: 2.27 THOUSANDS/ΜL (ref 0.6–4.47)
LYMPHOCYTES NFR BLD AUTO: 41 % (ref 14–44)
MCH RBC QN AUTO: 29.5 PG (ref 26.8–34.3)
MCHC RBC AUTO-ENTMCNC: 32.5 G/DL (ref 31.4–37.4)
MCV RBC AUTO: 91 FL (ref 82–98)
MONOCYTES # BLD AUTO: 0.61 THOUSAND/ΜL (ref 0.17–1.22)
MONOCYTES NFR BLD AUTO: 11 % (ref 4–12)
NEUTROPHILS # BLD AUTO: 2.46 THOUSANDS/ΜL (ref 1.85–7.62)
NEUTS SEG NFR BLD AUTO: 44 % (ref 43–75)
NONHDLC SERPL-MCNC: 190 MG/DL
NRBC BLD AUTO-RTO: 0 /100 WBCS
PLATELET # BLD AUTO: 221 THOUSANDS/UL (ref 149–390)
PMV BLD AUTO: 10.6 FL (ref 8.9–12.7)
POTASSIUM SERPL-SCNC: 3.7 MMOL/L (ref 3.5–5.3)
PROT SERPL-MCNC: 7.6 G/DL (ref 6.4–8.2)
RBC # BLD AUTO: 4.41 MILLION/UL (ref 3.81–5.12)
SODIUM SERPL-SCNC: 141 MMOL/L (ref 136–145)
TRIGL SERPL-MCNC: 163 MG/DL
TSH SERPL DL<=0.05 MIU/L-ACNC: 3.65 UIU/ML (ref 0.36–3.74)
WBC # BLD AUTO: 5.59 THOUSAND/UL (ref 4.31–10.16)

## 2020-10-14 PROCEDURE — 85025 COMPLETE CBC W/AUTO DIFF WBC: CPT

## 2020-10-14 PROCEDURE — 36415 COLL VENOUS BLD VENIPUNCTURE: CPT

## 2020-10-14 PROCEDURE — 84443 ASSAY THYROID STIM HORMONE: CPT

## 2020-10-14 PROCEDURE — 80061 LIPID PANEL: CPT

## 2020-10-14 PROCEDURE — 80053 COMPREHEN METABOLIC PANEL: CPT

## 2020-10-15 ENCOUNTER — APPOINTMENT (OUTPATIENT)
Dept: PHYSICAL THERAPY | Facility: CLINIC | Age: 55
End: 2020-10-15
Payer: COMMERCIAL

## 2020-10-16 ENCOUNTER — OFFICE VISIT (OUTPATIENT)
Dept: FAMILY MEDICINE CLINIC | Facility: CLINIC | Age: 55
End: 2020-10-16
Payer: COMMERCIAL

## 2020-10-16 VITALS
BODY MASS INDEX: 26.12 KG/M2 | WEIGHT: 153 LBS | DIASTOLIC BLOOD PRESSURE: 60 MMHG | HEART RATE: 65 BPM | RESPIRATION RATE: 12 BRPM | TEMPERATURE: 98.8 F | HEIGHT: 64 IN | SYSTOLIC BLOOD PRESSURE: 102 MMHG | OXYGEN SATURATION: 98 %

## 2020-10-16 DIAGNOSIS — M54.12 CERVICAL RADICULOPATHY: Primary | ICD-10-CM

## 2020-10-16 DIAGNOSIS — F51.01 PRIMARY INSOMNIA: ICD-10-CM

## 2020-10-16 DIAGNOSIS — F41.9 ANXIETY: Chronic | ICD-10-CM

## 2020-10-16 DIAGNOSIS — E78.2 MIXED HYPERLIPIDEMIA: ICD-10-CM

## 2020-10-16 PROBLEM — L03.90 CELLULITIS: Status: RESOLVED | Noted: 2019-07-16 | Resolved: 2020-10-16

## 2020-10-16 PROCEDURE — 99214 OFFICE O/P EST MOD 30 MIN: CPT | Performed by: FAMILY MEDICINE

## 2020-10-16 RX ORDER — GABAPENTIN 100 MG/1
100 CAPSULE ORAL 3 TIMES DAILY
Qty: 90 CAPSULE | Refills: 1 | Status: SHIPPED | OUTPATIENT
Start: 2020-10-16

## 2020-10-20 ENCOUNTER — OFFICE VISIT (OUTPATIENT)
Dept: PHYSICAL THERAPY | Facility: CLINIC | Age: 55
End: 2020-10-20
Payer: COMMERCIAL

## 2020-10-20 DIAGNOSIS — R26.9 GAIT DISTURBANCE: Primary | ICD-10-CM

## 2020-10-20 DIAGNOSIS — M54.12 CERVICAL RADICULOPATHY: ICD-10-CM

## 2020-10-20 DIAGNOSIS — M54.12 RADICULOPATHY, CERVICAL REGION: ICD-10-CM

## 2020-10-20 PROCEDURE — 97140 MANUAL THERAPY 1/> REGIONS: CPT | Performed by: PHYSICAL THERAPIST

## 2020-10-20 PROCEDURE — 97112 NEUROMUSCULAR REEDUCATION: CPT

## 2020-10-20 PROCEDURE — 97012 MECHANICAL TRACTION THERAPY: CPT

## 2020-10-22 ENCOUNTER — OFFICE VISIT (OUTPATIENT)
Dept: PHYSICAL THERAPY | Facility: CLINIC | Age: 55
End: 2020-10-22
Payer: COMMERCIAL

## 2020-10-22 DIAGNOSIS — M54.12 CERVICAL RADICULOPATHY: ICD-10-CM

## 2020-10-22 DIAGNOSIS — M54.12 RADICULOPATHY, CERVICAL REGION: ICD-10-CM

## 2020-10-22 DIAGNOSIS — R26.9 GAIT DISTURBANCE: Primary | ICD-10-CM

## 2020-10-22 PROCEDURE — 97140 MANUAL THERAPY 1/> REGIONS: CPT | Performed by: PHYSICAL THERAPIST

## 2020-10-22 PROCEDURE — 97110 THERAPEUTIC EXERCISES: CPT

## 2020-10-22 PROCEDURE — 97112 NEUROMUSCULAR REEDUCATION: CPT

## 2020-10-22 PROCEDURE — 97012 MECHANICAL TRACTION THERAPY: CPT

## 2020-10-27 ENCOUNTER — OFFICE VISIT (OUTPATIENT)
Dept: PHYSICAL THERAPY | Facility: CLINIC | Age: 55
End: 2020-10-27
Payer: COMMERCIAL

## 2020-10-27 DIAGNOSIS — M54.12 CERVICAL RADICULOPATHY: ICD-10-CM

## 2020-10-27 DIAGNOSIS — M54.12 RADICULOPATHY, CERVICAL REGION: ICD-10-CM

## 2020-10-27 DIAGNOSIS — R26.9 GAIT DISTURBANCE: Primary | ICD-10-CM

## 2020-10-27 PROCEDURE — 97530 THERAPEUTIC ACTIVITIES: CPT

## 2020-10-27 PROCEDURE — 97112 NEUROMUSCULAR REEDUCATION: CPT

## 2020-10-27 PROCEDURE — 97012 MECHANICAL TRACTION THERAPY: CPT

## 2020-10-27 PROCEDURE — 97110 THERAPEUTIC EXERCISES: CPT

## 2020-10-29 ENCOUNTER — OFFICE VISIT (OUTPATIENT)
Dept: PHYSICAL THERAPY | Facility: CLINIC | Age: 55
End: 2020-10-29
Payer: COMMERCIAL

## 2020-10-29 DIAGNOSIS — R26.9 GAIT DISTURBANCE: Primary | ICD-10-CM

## 2020-10-29 DIAGNOSIS — M54.12 RADICULOPATHY, CERVICAL REGION: ICD-10-CM

## 2020-10-29 DIAGNOSIS — M54.12 CERVICAL RADICULOPATHY: ICD-10-CM

## 2020-10-29 PROCEDURE — 97012 MECHANICAL TRACTION THERAPY: CPT

## 2020-10-29 PROCEDURE — 97530 THERAPEUTIC ACTIVITIES: CPT

## 2020-10-29 PROCEDURE — 97110 THERAPEUTIC EXERCISES: CPT

## 2020-10-29 PROCEDURE — 97112 NEUROMUSCULAR REEDUCATION: CPT

## 2020-11-05 ENCOUNTER — APPOINTMENT (OUTPATIENT)
Dept: PHYSICAL THERAPY | Facility: CLINIC | Age: 55
End: 2020-11-05
Payer: COMMERCIAL

## 2020-11-12 ENCOUNTER — OFFICE VISIT (OUTPATIENT)
Dept: OBGYN CLINIC | Facility: CLINIC | Age: 55
End: 2020-11-12
Payer: COMMERCIAL

## 2020-11-12 VITALS
HEART RATE: 59 BPM | SYSTOLIC BLOOD PRESSURE: 111 MMHG | WEIGHT: 153.6 LBS | BODY MASS INDEX: 26.22 KG/M2 | DIASTOLIC BLOOD PRESSURE: 70 MMHG | HEIGHT: 64 IN

## 2020-11-12 DIAGNOSIS — M54.12 RADICULOPATHY, CERVICAL REGION: Primary | ICD-10-CM

## 2020-11-12 PROCEDURE — 99213 OFFICE O/P EST LOW 20 MIN: CPT | Performed by: SURGERY

## 2020-11-13 ENCOUNTER — EVALUATION (OUTPATIENT)
Dept: PHYSICAL THERAPY | Facility: CLINIC | Age: 55
End: 2020-11-13
Payer: COMMERCIAL

## 2020-11-13 DIAGNOSIS — R26.9 GAIT DISTURBANCE: Primary | ICD-10-CM

## 2020-11-13 DIAGNOSIS — G47.00 INSOMNIA, UNSPECIFIED TYPE: ICD-10-CM

## 2020-11-13 DIAGNOSIS — M54.12 RADICULOPATHY, CERVICAL REGION: ICD-10-CM

## 2020-11-13 DIAGNOSIS — M54.12 CERVICAL RADICULOPATHY: ICD-10-CM

## 2020-11-13 PROCEDURE — 97110 THERAPEUTIC EXERCISES: CPT | Performed by: PHYSICAL THERAPIST

## 2020-11-13 PROCEDURE — 97112 NEUROMUSCULAR REEDUCATION: CPT | Performed by: PHYSICAL THERAPIST

## 2020-11-13 PROCEDURE — 97140 MANUAL THERAPY 1/> REGIONS: CPT | Performed by: PHYSICAL THERAPIST

## 2020-11-13 RX ORDER — SUVOREXANT 15 MG/1
1 TABLET, FILM COATED ORAL
Qty: 30 TABLET | Refills: 0 | Status: SHIPPED | OUTPATIENT
Start: 2020-11-13 | End: 2020-12-04 | Stop reason: SDUPTHER

## 2020-11-30 DIAGNOSIS — G47.00 INSOMNIA, UNSPECIFIED TYPE: ICD-10-CM

## 2020-12-02 RX ORDER — SUVOREXANT 15 MG/1
TABLET, FILM COATED ORAL
Qty: 30 TABLET | Refills: 0 | OUTPATIENT
Start: 2020-12-02

## 2020-12-04 DIAGNOSIS — G47.00 INSOMNIA, UNSPECIFIED TYPE: ICD-10-CM

## 2020-12-04 DIAGNOSIS — F41.9 ANXIETY: Chronic | ICD-10-CM

## 2020-12-04 RX ORDER — SUVOREXANT 15 MG/1
1 TABLET, FILM COATED ORAL
Qty: 30 TABLET | Refills: 0 | Status: SHIPPED | OUTPATIENT
Start: 2020-12-04 | End: 2021-02-01 | Stop reason: SDUPTHER

## 2020-12-04 RX ORDER — ALPRAZOLAM 0.25 MG/1
0.25 TABLET ORAL
Qty: 30 TABLET | Refills: 0 | Status: SHIPPED | OUTPATIENT
Start: 2020-12-04 | End: 2021-05-26

## 2020-12-07 ENCOUNTER — OFFICE VISIT (OUTPATIENT)
Dept: PAIN MEDICINE | Facility: CLINIC | Age: 55
End: 2020-12-07
Payer: COMMERCIAL

## 2020-12-07 VITALS
HEART RATE: 66 BPM | RESPIRATION RATE: 16 BRPM | TEMPERATURE: 97.7 F | WEIGHT: 157 LBS | BODY MASS INDEX: 26.8 KG/M2 | DIASTOLIC BLOOD PRESSURE: 70 MMHG | SYSTOLIC BLOOD PRESSURE: 119 MMHG | HEIGHT: 64 IN

## 2020-12-07 DIAGNOSIS — M54.12 CERVICAL RADICULOPATHY: Primary | ICD-10-CM

## 2020-12-07 DIAGNOSIS — G89.4 CHRONIC PAIN SYNDROME: ICD-10-CM

## 2020-12-07 DIAGNOSIS — G89.29 CHRONIC BILATERAL LOW BACK PAIN, UNSPECIFIED WHETHER SCIATICA PRESENT: ICD-10-CM

## 2020-12-07 DIAGNOSIS — M54.50 CHRONIC BILATERAL LOW BACK PAIN, UNSPECIFIED WHETHER SCIATICA PRESENT: ICD-10-CM

## 2020-12-07 DIAGNOSIS — M51.36 DISC DEGENERATION, LUMBAR: ICD-10-CM

## 2020-12-07 DIAGNOSIS — M79.18 MYOFASCIAL PAIN SYNDROME: ICD-10-CM

## 2020-12-07 PROCEDURE — 99214 OFFICE O/P EST MOD 30 MIN: CPT | Performed by: NURSE PRACTITIONER

## 2020-12-08 ENCOUNTER — TRANSCRIBE ORDERS (OUTPATIENT)
Dept: PAIN MEDICINE | Facility: CLINIC | Age: 55
End: 2020-12-08

## 2020-12-31 ENCOUNTER — OFFICE VISIT (OUTPATIENT)
Dept: FAMILY MEDICINE CLINIC | Facility: CLINIC | Age: 55
End: 2020-12-31
Payer: COMMERCIAL

## 2020-12-31 ENCOUNTER — HOSPITAL ENCOUNTER (OUTPATIENT)
Dept: MRI IMAGING | Facility: HOSPITAL | Age: 55
Discharge: HOME | End: 2020-12-31
Payer: COMMERCIAL

## 2020-12-31 VITALS
RESPIRATION RATE: 12 BRPM | HEIGHT: 64 IN | BODY MASS INDEX: 26.12 KG/M2 | WEIGHT: 153 LBS | DIASTOLIC BLOOD PRESSURE: 60 MMHG | HEART RATE: 78 BPM | TEMPERATURE: 98.1 F | OXYGEN SATURATION: 98 % | SYSTOLIC BLOOD PRESSURE: 96 MMHG

## 2020-12-31 DIAGNOSIS — E66.3 OVERWEIGHT: ICD-10-CM

## 2020-12-31 DIAGNOSIS — M54.12 CERVICAL RADICULOPATHY: ICD-10-CM

## 2020-12-31 DIAGNOSIS — H00.011 HORDEOLUM EXTERNUM OF RIGHT UPPER EYELID: Primary | ICD-10-CM

## 2020-12-31 PROCEDURE — 99214 OFFICE O/P EST MOD 30 MIN: CPT | Performed by: FAMILY MEDICINE

## 2020-12-31 PROCEDURE — 72141 MRI NECK SPINE W/O DYE: CPT

## 2020-12-31 PROCEDURE — G1004 CDSM NDSC: HCPCS

## 2021-01-04 ENCOUNTER — TELEPHONE (OUTPATIENT)
Dept: PAIN MEDICINE | Facility: CLINIC | Age: 56
End: 2021-01-04

## 2021-01-05 ENCOUNTER — TELEPHONE (OUTPATIENT)
Dept: RADIOLOGY | Facility: CLINIC | Age: 56
End: 2021-01-05

## 2021-01-05 NOTE — TELEPHONE ENCOUNTER
----- Message from [address] sent at 1/4/2021  2:24 PM EST -----  I called patient and left message to call back about results  If she calls back tell her the following please:    MRI C spine showed no changed from previous imaging  At C5-C6:  There is a right paracentral disc herniation   causing Moderate right neural foraminal narrowing  This can be causing her right arm pain  To help with pain and nerve irritation Dr Lorna Moncada can do a KIT    If interested let me know and I will place order

## 2021-01-06 NOTE — TELEPHONE ENCOUNTER
S/w pt, advised of MRI results per NM's notation  Pt verbalized understanding, states right sided sx have decreased significantly since starting therapy; pt states her left hand has started going numb when she is sleeping or talking n the phone  Pt asked if KIT would be beneficial for left sided sx as well? Please advise, thank you

## 2021-01-07 DIAGNOSIS — B00.1 HERPES LABIALIS: ICD-10-CM

## 2021-01-07 RX ORDER — VALACYCLOVIR HYDROCHLORIDE 1 G/1
TABLET, FILM COATED ORAL
Qty: 4 TABLET | Refills: 4 | OUTPATIENT
Start: 2021-01-07 | End: 2022-01-07

## 2021-01-07 RX ORDER — VALACYCLOVIR HYDROCHLORIDE 1 G/1
TABLET, FILM COATED ORAL
Qty: 4 TABLET | Refills: 5 | Status: SHIPPED | OUTPATIENT
Start: 2021-01-07 | End: 2022-03-28 | Stop reason: SDUPTHER

## 2021-01-07 NOTE — TELEPHONE ENCOUNTER
Patient called, left a voice message requesting her valtrex RX pharmacy be changed to [facility] on [address] in [place]      Medication refill request: Valtrex  Last office visit: 12/31/20  Next office visit: none  Last refilled: 6/2/20 to [facility]   Pharmacy: [id number] [address] [address]  Phone: [phone] Fax: [phone]    Pended: 4x4

## 2021-01-07 NOTE — TELEPHONE ENCOUNTER
Yes  She has a left side disc herniation at C6-C7, which is most likely causing her left hand symptoms   So yes the KIT would help with that also

## 2021-01-15 NOTE — TELEPHONE ENCOUNTER
LESTER  S/w pt, advised of NM's notation  Pt understood  Pt would like to hold off on KIT at this time, she will cb if she changes her mind  Pt appreciative of call

## 2021-02-01 ENCOUNTER — TELEPHONE (OUTPATIENT)
Dept: OTHER | Facility: OTHER | Age: 56
End: 2021-02-01

## 2021-02-01 DIAGNOSIS — G47.00 INSOMNIA, UNSPECIFIED TYPE: ICD-10-CM

## 2021-02-01 RX ORDER — SUVOREXANT 15 MG/1
1 TABLET, FILM COATED ORAL
Qty: 30 TABLET | Refills: 0 | Status: SHIPPED | OUTPATIENT
Start: 2021-02-01 | End: 2021-04-09 | Stop reason: SDUPTHER

## 2021-02-01 NOTE — TELEPHONE ENCOUNTER
Medication refill requested: Belsomra  Last office visit: 12/31/20  Next office visit: 2/16/21  Last refilled: 12/4/20 #30x0  Pharmacy:   [facility][1] DIV, [address]  Phone: [phone] Fax: [phone]        Pended: 30x0

## 2021-02-08 ENCOUNTER — TRANSCRIBE ORDERS (OUTPATIENT)
Dept: ADMINISTRATIVE | Facility: HOSPITAL | Age: 56
End: 2021-02-08

## 2021-02-08 DIAGNOSIS — Z12.31 SCREENING MAMMOGRAM FOR HIGH-RISK PATIENT: Primary | ICD-10-CM

## 2021-02-16 ENCOUNTER — TELEMEDICINE (OUTPATIENT)
Dept: FAMILY MEDICINE CLINIC | Facility: CLINIC | Age: 56
End: 2021-02-16

## 2021-02-16 DIAGNOSIS — F41.1 ANXIETY STATE: Primary | ICD-10-CM

## 2021-02-16 PROCEDURE — 99442 PR PHYS/QHP TELEPHONE EVALUATION 11-20 MIN: CPT | Performed by: FAMILY MEDICINE

## 2021-02-16 RX ORDER — SERTRALINE HYDROCHLORIDE 25 MG/1
TABLET, FILM COATED ORAL
Qty: 60 TABLET | Refills: 5 | Status: SHIPPED | OUTPATIENT
Start: 2021-02-16 | End: 2021-08-06

## 2021-02-16 NOTE — PROGRESS NOTES
Virtual Brief Visit    Assessment/Plan:    Problem List Items Addressed This Visit     None      Visit Diagnoses     Anxiety state    -  Primary  Discussed options and potential for weight gain  Since wellbutrin may exacerbate her anxiety, will use zoloft and uptitrate  Recheck 1 mo  Call for any problems    Relevant Medications    sertraline (ZOLOFT) 25 mg tablet                Reason for visit is   Chief Complaint   Patient presents with    Virtual Regular Visit     VS provided at provider visit    [facility] Virtual Brief Visit        Encounter provider Sharia Frankel, DO    Provider located at [address] [place] [id number] [phone]    Recent Visits  No visits were found meeting these conditions  Showing recent visits within past 7 days and meeting all other requirements     Today's Visits  Date Type Provider Dept   02/16/21 Telemedicine Sharia Frankel, DO [facility]   Showing today's visits and meeting all other requirements     Future Appointments  No visits were found meeting these conditions  Showing future appointments within next 150 days and meeting all other requirements        After connecting through telephone, the patient was identified by name and date of birth  Merline Lean was informed that this is a telemedicine visit and that the visit is being conducted through telephone  My office door was closed  No one else was in the room  She acknowledged consent and understanding of privacy and security of the platform  The patient has agreed to participate and understands she can discontinue the visit at any time  Patient is aware this is a billable service  Subjective    Merline Lean is a 48 y o  female who presents concerned about anxiety  Notices worry, irritability, feeling anxious  Deals with many stressors, particularly concerning raising daughter with dev disability and psych issues    She wonders about starting wellbutrin  She is concerned about weight gain which is why she is requesting this med  No hopelessness, depression  Is thinking about the coming years and wants to be less reactive and more peaceful when dealing with challenges  Used celexa before and noticed some weight gain  No si/hi      Past Medical History:   Diagnosis Date    Acid reflux     Allergic reaction to dye     Resolved - [id number]    Aneurysm of thoracic aorta (HCC)     last assess 2017    Anxiety     Arthritis     Atrophic vaginitis     Benign familial tremor     Last assessed - 10Vlw1343    Cervical back pain with evidence of disc disease     Chronic otitis media of right ear     Last assessed - 29Jan2015    Contact dermatitis     Last assessed - 83FQI2067    Dermatitis     Disturbance of smell     Last assessed - 29Apr2013    DJD (degenerative joint disease)     Endometriosis     Last assessed - 42KZR9950    Esophageal reflux     Last assessed - 72Xff0558    Extremity pain     RUE    Folliculitis     Last assessed - 24Apr2013    Headache     Herpes simplex     High cholesterol     Hyperlipidemia     Last assessed - 58FKN7512    Lateral epicondylitis of left elbow     Last assessed - 45QGB9504    Lumbar back pain     Myofascial pain syndrome     Neoplasm of skin     Last assessed - 64Tfa9223    Osteoarthritis     PONV (postoperative nausea and vomiting)     severe    Postmenopausal atrophic vaginitis     Last assessed - 84TZS9674    Prophylactic antibiotic     Last assessed - 14MXM5482    Rotator cuff disorder     Sleep apnea     mild no cpap    Taste impairment     taste disturbances - Last assessed - 29Apr2013    Tremor, hereditary, benign     Wound, open, finger     Last assessed - 37OVR0487       Past Surgical History:   Procedure Laterality Date    COLONOSCOPY      DIAGNOSTIC LAPAROSCOPY      LAPAROSCOPIC ENDOMETRIOSIS FULGURATION      Laparoscop excis endometriotic tissue uterosacral ligaments    AL COLONOSCOPY FLX DX W/COLLJ SPEC WHEN PFRMD N/A 12/6/2018    Procedure: COLONOSCOPY;  Surgeon: Staci Tirado DO;  Location: [facility]; Service: Gastroenterology    NH ESOPHAGOGASTRODUODENOSCOPY TRANSORAL DIAGNOSTIC N/A 12/6/2018    Procedure: ESOPHAGOGASTRODUODENOSCOPY (EGD); Surgeon: Staci Tirado DO;  Location: [facility]; Service: Gastroenterology    NH TOTAL HIP ARTHROPLASTY Left 5/16/2016    Procedure: ANTERIOR TOTAL HIP ARTHROPLASTY ;  Surgeon: Alpa Taylor MD;  [facility];  Service: Orthopedics    REVISION TOTAL HIP ARTHROPLASTY  06/15/2011    TOTAL HIP ARTHROPLASTY Right     TOTAL HIP ARTHROPLASTY  10/20/2010    UPPER GASTROINTESTINAL ENDOSCOPY         Current Outpatient Medications   Medication Sig Dispense Refill    ALPRAZolam (XANAX) 0 25 mg tablet Take 1 tablet (0 25 mg total) by mouth daily at bedtime as needed for anxiety 30 tablet 0    ascorbic acid (VITAMIN C) 500 mg tablet Take 500 mg by mouth daily       Calcium-Vitamin D (CALTRATE 600 PLUS-VIT D PO) Take 600 mg by mouth daily       clindamycin (CLEOCIN T) 1 % external solution apply topically to affected area AS NEEDED FOR FLARES OF THE SCALP  0    estradiol (VAGIFEM, YUVAFEM) 10 MCG TABS vaginal tablet 1 tablet vaginally q hs x 14 days then twice weekly 40 tablet 3    gabapentin (NEURONTIN) 100 mg capsule Take 1 capsule (100 mg total) by mouth 3 (three) times a day (Patient taking differently: Take 100 mg by mouth daily at bedtime ) 90 capsule 1    Multiple Vitamins-Minerals (MULTIVITAMIN WITH MINERALS) tablet Take 1 tablet by mouth daily        omeprazole (PriLOSEC) 20 mg delayed release capsule Take 1 capsule (20 mg total) by mouth 2 (two) times a day (Patient taking differently: Take 20 mg by mouth daily as needed ) 60 capsule 5    orphenadrine (NORFLEX) 100 mg tablet Take 1 tablet (100 mg total) by mouth daily as needed for muscle spasms 30 tablet 1    Suvorexant (Belsomra) 15 MG TABS Take 1 tablet (15 mg total) by mouth daily at bedtime 30 tablet 0    traMADol (ULTRAM) 50 mg tablet Take 1 tablet (50 mg total) by mouth every 6 (six) hours as needed for severe pain 20 tablet 0    TURMERIC PO Take 1 capsule by mouth daily       valACYclovir (VALTREX) 1,000 mg tablet TAKE 1 TABLET BY MOUTH DAILY AS NEEDED FOR ORAL LESION FOR UP TO 4 DAYS 4 tablet 5    zinc gluconate 50 mg tablet Take 50 mg by mouth daily   methocarbamol (ROBAXIN) 750 mg tablet Take 1 tablet (750 mg total) by mouth 2 (two) times a day as needed for muscle spasms 180 tablet 1    sertraline (ZOLOFT) 25 mg tablet 1/2 tab daily x 1 week, then 1 tab daily x 1 week, then 1 1/2 tabs daily x 1 week, then 2 tabs daily 60 tablet 5     No current facility-administered medications for this visit  Allergies   Allergen Reactions    Iodinated Diagnostic Agents Swelling and Eye Swelling     IVP dye specifically; swollen eye lid shut    Morphine Vomiting     Reaction Date: [id number];     Morphine And Related      DENIES ALLERGY RELATES N/V TO ANESTHESIA    Vicodin [Hydrocodone-Acetaminophen]      No allergy to tylenol   Does have N/V to vicoden    Effexor [Venlafaxine] Arthralgia    Gentamicin Rash     Reaction Date: [id number];        Review of Systems    There were no vitals filed for this visit  I spent 15 minutes directly with the patient during this visit    VIRTUAL VISIT [address] [place] [address] acknowledges that she has consented to an online visit or consultation  She understands that the online visit is based solely on information provided by her, and that, in the absence of a face-to-face physical evaluation by the physician, the diagnosis she receives is both limited and provisional in terms of accuracy and completeness  This is not intended to replace a full medical face-to-face evaluation by the physician  Maria Alejandra Madden understands and accepts these terms

## 2021-02-16 NOTE — PATIENT INSTRUCTIONS
Recheck 1 mo  Start zoloft 25mg tabs: 1/2 tab daily x 1 week, then 1 tab daily x 1 week, then 1 1/2 tabs daily x 1 week, then 2 tabs daily

## 2021-03-26 ENCOUNTER — TELEMEDICINE (OUTPATIENT)
Dept: FAMILY MEDICINE CLINIC | Facility: CLINIC | Age: 56
End: 2021-03-26
Payer: COMMERCIAL

## 2021-03-26 VITALS — WEIGHT: 153 LBS | BODY MASS INDEX: 26.06 KG/M2 | TEMPERATURE: 98.7 F

## 2021-03-26 DIAGNOSIS — Z20.822 EXPOSURE TO COVID-19 VIRUS: ICD-10-CM

## 2021-03-26 DIAGNOSIS — Z20.822 EXPOSURE TO COVID-19 VIRUS: Primary | ICD-10-CM

## 2021-03-26 PROCEDURE — U0003 INFECTIOUS AGENT DETECTION BY NUCLEIC ACID (DNA OR RNA); SEVERE ACUTE RESPIRATORY SYNDROME CORONAVIRUS 2 (SARS-COV-2) (CORONAVIRUS DISEASE [COVID-19]), AMPLIFIED PROBE TECHNIQUE, MAKING USE OF HIGH THROUGHPUT TECHNOLOGIES AS DESCRIBED BY CMS-2020-01-R: HCPCS | Performed by: FAMILY MEDICINE

## 2021-03-26 PROCEDURE — U0005 INFEC AGEN DETEC AMPLI PROBE: HCPCS | Performed by: FAMILY MEDICINE

## 2021-03-26 PROCEDURE — 99214 OFFICE O/P EST MOD 30 MIN: CPT | Performed by: FAMILY MEDICINE

## 2021-03-26 NOTE — PROGRESS NOTES
COVID-19 Virtual Visit     Assessment/Plan:    Problem List Items Addressed This Visit     None      Visit Diagnoses     Exposure to COVID-19 virus    -  Primary    Relevant Orders    Novel Coronavirus (Covid-19),PCR [facility] - Collected at Mobile [facility] or [facility]         Disposition:     I referred patient to one of our centralized sites for a COVID-19 swab  Likely positive given exposure to  and daughter  If positive, quarantine 10 days and until sx-free x 24 hours  Earliest return to work 4/2      I have spent 5 minutes directly with the patient  Greater than 50% of this time was spent in counseling/coordination of care regarding: instructions for management  Encounter provider Theresa Dakin, DO    Provider located at [address] [place] [address],[address]  [address]  [facility] [address] [phone]    Recent Visits  No visits were found meeting these conditions  Showing recent visits within past 7 days and meeting all other requirements     Today's Visits  Date Type Provider Dept   03/26/21 Telemedicine Theresa Dakin, DO [facility]   Showing today's visits and meeting all other requirements     Future Appointments  No visits were found meeting these conditions  Showing future appointments within next 150 days and meeting all other requirements        Patient agrees to participate in a virtual check in via telephone or video visit instead of presenting to the office to address urgent/immediate medical needs  Patient is aware this is a billable service  After connecting through [facility], the patient was identified by name and date of birth  Milly Parents was informed that this was a telemedicine visit and that the exam was being conducted confidentially over secure lines  My office door was closed  No one else was in the room  Milly Parents acknowledged consent and understanding of privacy and security of the telemedicine visit   I informed the patient that I have reviewed her record in [address] [place] [address] and presented the opportunity for her to ask any questions regarding the visit today  The patient agreed to participate  Subjective:   Sophia García is a 48 y o  female who is concerned about COVID-19  Patient's symptoms include fever, chills, fatigue, malaise, nasal congestion, rhinorrhea, sore throat, cough, myalgias and headache  Patient denies anosmia, loss of taste, shortness of breath, chest tightness, abdominal pain, nausea, vomiting and diarrhea  Date of symptom onset: 3/23/2021  Date of exposure: 3/22/2021    Exposure:   Contact with a person who is under investigation (PUI) for or who is positive for COVID-19 within the last 14 days?: Yes    Hospitalized recently for fever and/or lower respiratory symptoms?: No      Currently a healthcare worker that is involved in direct patient care?: Yes      Works in a special setting where the risk of COVID-19 transmission may be high? (this may include long-term care, correctional and longterm facilities; homeless shelters; assisted-living facilities and group homes ): No      Resident in a special setting where the risk of COVID-19 transmission may be high? (this may include long-term care, correctional and longterm facilities; homeless shelters; assisted-living facilities and group homes ): No       and daughter both positive and finishing their 10-day quarantine    Pt started Tuesday with symptoms    No results found for: Billy Talbot, [facility]  Past Medical History:   Diagnosis Date    Acid reflux     Allergic reaction to dye     Resolved - [id number]    Aneurysm of thoracic aorta (HCC)     last assess 2017    Anxiety     Arthritis     Atrophic vaginitis     Benign familial tremor     Last assessed - [id number]    Cervical back pain with evidence of disc disease     Chronic otitis media of right ear     Last assessed - 20AZK1039    Contact dermatitis     Last assessed - 40ZBA3399    Dermatitis     Disturbance of smell     Last assessed - 97Pjk7430    DJD (degenerative joint disease)     Endometriosis     Last assessed - 92FJG6772    Esophageal reflux     Last assessed - 55Wns2802    Extremity pain     RUE    Folliculitis     Last assessed - 72Zzg7335    Headache     Herpes simplex     High cholesterol     Hyperlipidemia     Last assessed - 89ZEJ7005    Lateral epicondylitis of left elbow     Last assessed - 34ITY0850    Lumbar back pain     Myofascial pain syndrome     Neoplasm of skin     Last assessed - 24Rul4324    Osteoarthritis     PONV (postoperative nausea and vomiting)     severe    Postmenopausal atrophic vaginitis     Last assessed - 95QBL1210    Prophylactic antibiotic     Last assessed - 62ZYI6072    Rotator cuff disorder     Sleep apnea     mild no cpap    Taste impairment     taste disturbances - Last assessed - 75Aim0434    Tremor, hereditary, benign     Wound, open, finger     Last assessed - 24QBP8221     Past Surgical History:   Procedure Laterality Date    COLONOSCOPY      DIAGNOSTIC LAPAROSCOPY      LAPAROSCOPIC ENDOMETRIOSIS FULGURATION      Laparoscop excis endometriotic tissue uterosacral ligaments    MT COLONOSCOPY FLX DX W/COLLJ SPEC WHEN PFRMD N/A 12/6/2018    Procedure: COLONOSCOPY;  Surgeon: Ginny Shipley DO;  Location: [facility]; Service: Gastroenterology    MT ESOPHAGOGASTRODUODENOSCOPY TRANSORAL DIAGNOSTIC N/A 12/6/2018    Procedure: ESOPHAGOGASTRODUODENOSCOPY (EGD); Surgeon: Ginny Shipley DO;  Location: [facility];   Service: Gastroenterology    MT TOTAL HIP ARTHROPLASTY Left 5/16/2016    Procedure: ANTERIOR TOTAL HIP ARTHROPLASTY ;  Surgeon: Serge Thao MD;  [facility];  Service: Orthopedics    REVISION TOTAL HIP ARTHROPLASTY  06/15/2011    TOTAL HIP ARTHROPLASTY Right     TOTAL HIP ARTHROPLASTY  10/20/2010    UPPER GASTROINTESTINAL ENDOSCOPY       Current Outpatient Medications   Medication Sig Dispense Refill    ALPRAZolam (XANAX) 0 25 mg tablet Take 1 tablet (0 25 mg total) by mouth daily at bedtime as needed for anxiety 30 tablet 0    ascorbic acid (VITAMIN C) 500 mg tablet Take 500 mg by mouth daily       Calcium-Vitamin D (CALTRATE 600 PLUS-VIT D PO) Take 600 mg by mouth daily       clindamycin (CLEOCIN T) 1 % external solution apply topically to affected area AS NEEDED FOR FLARES OF THE SCALP  0    estradiol (VAGIFEM, YUVAFEM) 10 MCG TABS vaginal tablet 1 tablet vaginally q hs x 14 days then twice weekly 40 tablet 3    gabapentin (NEURONTIN) 100 mg capsule Take 1 capsule (100 mg total) by mouth 3 (three) times a day 90 capsule 1    methocarbamol (ROBAXIN) 750 mg tablet Take 1 tablet (750 mg total) by mouth 2 (two) times a day as needed for muscle spasms 180 tablet 1    Multiple Vitamins-Minerals (MULTIVITAMIN WITH MINERALS) tablet Take 1 tablet by mouth daily   omeprazole (PriLOSEC) 20 mg delayed release capsule Take 1 capsule (20 mg total) by mouth 2 (two) times a day (Patient taking differently: Take 20 mg by mouth daily as needed ) 60 capsule 5    orphenadrine (NORFLEX) 100 mg tablet Take 1 tablet (100 mg total) by mouth daily as needed for muscle spasms 30 tablet 1    sertraline (ZOLOFT) 25 mg tablet 1/2 tab daily x 1 week, then 1 tab daily x 1 week, then 1 1/2 tabs daily x 1 week, then 2 tabs daily 60 tablet 5    Suvorexant (Belsomra) 15 MG TABS Take 1 tablet (15 mg total) by mouth daily at bedtime 30 tablet 0    traMADol (ULTRAM) 50 mg tablet Take 1 tablet (50 mg total) by mouth every 6 (six) hours as needed for severe pain 20 tablet 0    TURMERIC PO Take 1 capsule by mouth daily       valACYclovir (VALTREX) 1,000 mg tablet TAKE 1 TABLET BY MOUTH DAILY AS NEEDED FOR ORAL LESION FOR UP TO 4 DAYS 4 tablet 5    zinc gluconate 50 mg tablet Take 50 mg by mouth daily  No current facility-administered medications for this visit        Allergies   Allergen Reactions    Iodinated Diagnostic Agents Swelling and Eye Swelling     IVP dye specifically; swollen eye lid shut    Morphine Vomiting     Reaction Date: [id number];     Morphine And Related      DENIES ALLERGY RELATES N/V TO ANESTHESIA    Vicodin [Hydrocodone-Acetaminophen]      No allergy to tylenol   Does have N/V to vicoden    Effexor [Venlafaxine] Arthralgia    Gentamicin Rash     Reaction Date: [id number];        Review of Systems   Constitutional: Positive for chills, fatigue and fever  HENT: Positive for congestion, rhinorrhea and sore throat  Respiratory: Positive for cough  Negative for chest tightness and shortness of breath  Gastrointestinal: Negative for abdominal pain, diarrhea, nausea and vomiting  Musculoskeletal: Positive for myalgias  Neurological: Positive for headaches  Objective:    Vitals:    03/26/21 1131   Temp: 98 7 °F (37 1 °C)   TempSrc: Tympanic   Weight: 69 4 kg (153 lb)       Physical Exam  Constitutional:       Appearance: She is well-developed  She is not ill-appearing  HENT:      Head: Normocephalic and atraumatic  Nose: Congestion present  Eyes:      Extraocular Movements: Extraocular movements intact  Conjunctiva/sclera: Conjunctivae normal    Pulmonary:      Effort: Pulmonary effort is normal  No respiratory distress  Skin:     Findings: No rash  Neurological:      Mental Status: She is alert and oriented to person, place, and time  Cranial Nerves: No cranial nerve deficit  VIRTUAL VISIT DISCLAIMER    Maria Alejandra Silva acknowledges that she has consented to an online visit or consultation  She understands that the online visit is based solely on information provided by her, and that, in the absence of a face-to-face physical evaluation by the physician, the diagnosis she receives is both limited and provisional in terms of accuracy and completeness  This is not intended to replace a full medical face-to-face evaluation by the physician   Laura Benavides understands and accepts these terms

## 2021-03-27 LAB — SARS-COV-2 RNA RESP QL NAA+PROBE: POSITIVE

## 2021-03-28 ENCOUNTER — PATIENT MESSAGE (OUTPATIENT)
Dept: FAMILY MEDICINE CLINIC | Facility: CLINIC | Age: 56
End: 2021-03-28

## 2021-03-29 ENCOUNTER — TELEPHONE (OUTPATIENT)
Dept: FAMILY MEDICINE CLINIC | Facility: CLINIC | Age: 56
End: 2021-03-29

## 2021-04-09 DIAGNOSIS — G47.00 INSOMNIA, UNSPECIFIED TYPE: ICD-10-CM

## 2021-04-09 RX ORDER — SUVOREXANT 15 MG/1
1 TABLET, FILM COATED ORAL
Qty: 30 TABLET | Refills: 0 | Status: SHIPPED | OUTPATIENT
Start: 2021-04-09 | End: 2021-05-25 | Stop reason: SDUPTHER

## 2021-04-09 NOTE — TELEPHONE ENCOUNTER
Medication refill requested: Belsomra  Last office visit: 2/16/21  Next office visit: none  Last refilled: 2/1/21 #30x0  Pharmacy: [address] [place] [address]  Phone: [phone] Fax: [phone]    Pended: 30x0

## 2021-04-27 ENCOUNTER — TELEPHONE (OUTPATIENT)
Dept: GYNECOLOGY | Facility: CLINIC | Age: 56
End: 2021-04-27

## 2021-04-27 DIAGNOSIS — N95.2 ATROPHIC VAGINITIS: ICD-10-CM

## 2021-04-27 RX ORDER — ESTRADIOL 10 UG/1
INSERT VAGINAL
Qty: 40 TABLET | Refills: 3 | Status: SHIPPED | OUTPATIENT
Start: 2021-04-27 | End: 2021-05-18 | Stop reason: SDUPTHER

## 2021-04-27 NOTE — TELEPHONE ENCOUNTER
Patient needs refill of Vagifem  Please send to [facility] mail order  She did set up AE for 5/11/21

## 2021-05-06 ENCOUNTER — TELEPHONE (OUTPATIENT)
Dept: PAIN MEDICINE | Facility: CLINIC | Age: 56
End: 2021-05-06

## 2021-05-06 DIAGNOSIS — M50.120 CERVICAL DISC DISORDER WITH RADICULOPATHY OF MID-CERVICAL REGION: Primary | ICD-10-CM

## 2021-05-06 NOTE — TELEPHONE ENCOUNTER
Pt is calling asking to schedule a procedure that was discussed after her Cervical MRI she said she has thought about it and would like to schedule  I do onto see an order anywhere   Please advise

## 2021-05-07 NOTE — TELEPHONE ENCOUNTER
Scheduled pt for [facility] for 5/27/21  Pt is taking Diclofenac as needed and was instructed to hold for 4 days with last dose on 5/22/21  Pt was also instructed to hold excedrin for 6 days with last dose on 5/19/21  Went over pre-procedure instructions below:  Nothing to eat or drink 1 hr prior to procedure  Need to arrange transportation  Proper clothing for procedure  If ill or placed on antibiotics please call to reschedule  Covid/travel/ and vaccine instructions

## 2021-05-18 ENCOUNTER — ANNUAL EXAM (OUTPATIENT)
Dept: GYNECOLOGY | Facility: CLINIC | Age: 56
End: 2021-05-18
Payer: COMMERCIAL

## 2021-05-18 VITALS
BODY MASS INDEX: 25.52 KG/M2 | SYSTOLIC BLOOD PRESSURE: 114 MMHG | DIASTOLIC BLOOD PRESSURE: 60 MMHG | HEART RATE: 80 BPM | WEIGHT: 153.2 LBS | HEIGHT: 65 IN

## 2021-05-18 DIAGNOSIS — Z13.820 SCREENING FOR OSTEOPOROSIS: ICD-10-CM

## 2021-05-18 DIAGNOSIS — N95.2 ATROPHIC VAGINITIS: ICD-10-CM

## 2021-05-18 DIAGNOSIS — Z12.4 ENCOUNTER FOR PAPANICOLAOU SMEAR FOR CERVICAL CANCER SCREENING: Primary | ICD-10-CM

## 2021-05-18 PROCEDURE — G0145 SCR C/V CYTO,THINLAYER,RESCR: HCPCS | Performed by: OBSTETRICS & GYNECOLOGY

## 2021-05-18 PROCEDURE — 76977 US BONE DENSITY MEASURE: CPT | Performed by: OBSTETRICS & GYNECOLOGY

## 2021-05-18 PROCEDURE — 99396 PREV VISIT EST AGE 40-64: CPT | Performed by: OBSTETRICS & GYNECOLOGY

## 2021-05-18 RX ORDER — ESTRADIOL 10 UG/1
INSERT VAGINAL
Qty: 40 TABLET | Refills: 3 | Status: SHIPPED | OUTPATIENT
Start: 2021-05-18 | End: 2022-05-27 | Stop reason: SDUPTHER

## 2021-05-18 NOTE — PROGRESS NOTES
Assessment/Plan:         Diagnoses and all orders for this visit:    Encounter for Papanicolaou smear for cervical cancer screening  -     Liquid-based pap, screening    Screening for osteoporosis: heel scan:-0 3    Atrophic vaginitis  -     estradiol (VAGIFEM, YUVAFEM) 10 MCG TABS vaginal tablet; 1 tablet vaginally q hs x 14 days then twice weekly        Subjective:      Patient ID: Lissette Gutierrez is a 48 y o  female  HPI  patient presents for annual examination  She denies any vaginal irritation, burning, discharge or bleeding  Denies any dysuria, hematuria urgency or urinary incontinence  No GI complaints  She has been using Vagifem for atrophy  Colonoscopy December 2018 normal repeat in 5 years secondary to family history    The following portions of the patient's history were reviewed and updated as appropriate:   She  has a past medical history of Acid reflux, Allergic reaction to dye, Aneurysm of thoracic aorta (HCC), Anxiety, Arthritis, Atrophic vaginitis, Benign familial tremor, Cervical back pain with evidence of disc disease, Chronic otitis media of right ear, Contact dermatitis, Dermatitis, Disturbance of smell, DJD (degenerative joint disease), Endometriosis, Esophageal reflux, Extremity pain, Folliculitis, Headache, Herpes simplex, High cholesterol, Hyperlipidemia, Lateral epicondylitis of left elbow, Lumbar back pain, Myofascial pain syndrome, Neoplasm of skin, Osteoarthritis, PONV (postoperative nausea and vomiting), Postmenopausal atrophic vaginitis, Prophylactic antibiotic, Rotator cuff disorder, Sleep apnea, Taste impairment, Tremor, hereditary, benign, and Wound, open, finger    She   Patient Active Problem List    Diagnosis Date Noted    Tendinitis of right rotator cuff 08/22/2019    Subacromial bursitis of right shoulder joint 08/22/2019    Bee sting reaction 07/16/2019    Insomnia 05/23/2019    IT band syndrome 05/23/2019    Biceps tendinitis 05/23/2019    Acromioclavicular joint arthritis 05/23/2019    Knee pain, chronic 03/08/2019    Chronic midline thoracic back pain 12/24/2018    Colon cancer screening 09/07/2018    Chronic fatigue 07/11/2018    Bilateral carpal tunnel syndrome 05/10/2018    Acute non-recurrent sinusitis 03/05/2018    Endometrial polyp 05/16/2017    Postmenopausal bleeding 05/16/2017    BRENDA (obstructive sleep apnea) 10/12/2016    Episodic tension-type headache, not intractable 10/05/2016    Status post total replacement of left hip 05/17/2016    Esophageal reflux 05/17/2016    Anxiety 05/17/2016    Menopause 05/10/2016    DJD (degenerative joint disease)     Dense breasts 04/07/2016    Hemangioma of skin 03/10/2015    Lumbar degenerative disc disease 06/03/2014    Chronic lower back pain 05/06/2014    Pelvic and perineal pain 01/24/2014    Pelvic congestion syndrome 01/24/2014    Cervical radiculopathy 08/26/2013    DJD (degenerative joint disease), cervical 08/26/2013    Sleep apnea 07/11/2013    Herniated cervical disc 06/24/2013    Herpes simplex infection 11/26/2012    Benign familial tremor 06/14/2012    Endometriosis 06/14/2012    Hyperlipidemia 06/14/2012     She  has a past surgical history that includes Diagnostic laparoscopy; Total hip arthroplasty (Right); Revision total hip arthroplasty (06/15/2011); pr total hip arthroplasty (Left, 5/16/2016); Total hip arthroplasty (10/20/2010); Laparoscopic endometriosis fulguration; Colonoscopy; Upper gastrointestinal endoscopy; pr esophagogastroduodenoscopy transoral diagnostic (N/A, 12/6/2018); and pr colonoscopy flx dx w/collj spec when pfrmd (N/A, 12/6/2018)    Her family history includes Arthritis in her brother and father; Atrial fibrillation in her mother; Breast cancer in her family, other, and other; Breast cancer (age of onset: 48) in her maternal aunt; Breast cancer (age of onset: 72) in her maternal uncle; Colon cancer in her family; Colon cancer (age of onset: 66) in her paternal grandmother; Diabetes in her family; Diverticulitis in her mother; Esophageal cancer (age of onset: 80) in her father; Hypertension in her mother; No Known Problems in her maternal aunt, maternal grandfather, maternal grandmother, paternal grandfather, sister, and sister; Other in her brother and family; Prostate cancer (age of onset: [de-identified]) in her paternal uncle; Stroke in her father and mother  She  reports that she has never smoked  She has never used smokeless tobacco  She reports current alcohol use  She reports that she does not use drugs  Current Outpatient Medications   Medication Sig Dispense Refill    ALPRAZolam (XANAX) 0 25 mg tablet Take 1 tablet (0 25 mg total) by mouth daily at bedtime as needed for anxiety 30 tablet 0    ascorbic acid (VITAMIN C) 500 mg tablet Take 500 mg by mouth daily       Calcium-Vitamin D (CALTRATE 600 PLUS-VIT D PO) Take 600 mg by mouth daily       clindamycin (CLEOCIN T) 1 % external solution apply topically to affected area AS NEEDED FOR FLARES OF THE SCALP  0    estradiol (VAGIFEM, YUVAFEM) 10 MCG TABS vaginal tablet 1 tablet vaginally q hs x 14 days then twice weekly 40 tablet 3    sertraline (ZOLOFT) 25 mg tablet 1/2 tab daily x 1 week, then 1 tab daily x 1 week, then 1 1/2 tabs daily x 1 week, then 2 tabs daily (Patient taking differently: 1/2 tab daily) 60 tablet 5    Suvorexant (Belsomra) 15 MG TABS Take 1 tablet (15 mg total) by mouth daily at bedtime 30 tablet 0    valACYclovir (VALTREX) 1,000 mg tablet TAKE 1 TABLET BY MOUTH DAILY AS NEEDED FOR ORAL LESION FOR UP TO 4 DAYS 4 tablet 5    zinc gluconate 50 mg tablet Take 50 mg by mouth daily        gabapentin (NEURONTIN) 100 mg capsule Take 1 capsule (100 mg total) by mouth 3 (three) times a day (Patient not taking: Reported on 5/18/2021) 90 capsule 1    methocarbamol (ROBAXIN) 750 mg tablet Take 1 tablet (750 mg total) by mouth 2 (two) times a day as needed for muscle spasms (Patient not taking: Reported on 5/18/2021) 180 tablet 1    Multiple Vitamins-Minerals (MULTIVITAMIN WITH MINERALS) tablet Take 1 tablet by mouth daily   omeprazole (PriLOSEC) 20 mg delayed release capsule Take 1 capsule (20 mg total) by mouth 2 (two) times a day (Patient not taking: Reported on 5/18/2021) 60 capsule 5    orphenadrine (NORFLEX) 100 mg tablet Take 1 tablet (100 mg total) by mouth daily as needed for muscle spasms (Patient not taking: Reported on 5/18/2021) 30 tablet 1    traMADol (ULTRAM) 50 mg tablet Take 1 tablet (50 mg total) by mouth every 6 (six) hours as needed for severe pain (Patient not taking: Reported on 5/18/2021) 20 tablet 0    TURMERIC PO Take 1 capsule by mouth daily        No current facility-administered medications for this visit  Current Outpatient Medications on File Prior to Visit   Medication Sig    ALPRAZolam (XANAX) 0 25 mg tablet Take 1 tablet (0 25 mg total) by mouth daily at bedtime as needed for anxiety    ascorbic acid (VITAMIN C) 500 mg tablet Take 500 mg by mouth daily     Calcium-Vitamin D (CALTRATE 600 PLUS-VIT D PO) Take 600 mg by mouth daily     clindamycin (CLEOCIN T) 1 % external solution apply topically to affected area AS NEEDED FOR FLARES OF THE SCALP    sertraline (ZOLOFT) 25 mg tablet 1/2 tab daily x 1 week, then 1 tab daily x 1 week, then 1 1/2 tabs daily x 1 week, then 2 tabs daily (Patient taking differently: 1/2 tab daily)    Suvorexant (Belsomra) 15 MG TABS Take 1 tablet (15 mg total) by mouth daily at bedtime    valACYclovir (VALTREX) 1,000 mg tablet TAKE 1 TABLET BY MOUTH DAILY AS NEEDED FOR ORAL LESION FOR UP TO 4 DAYS    zinc gluconate 50 mg tablet Take 50 mg by mouth daily      [DISCONTINUED] estradiol (VAGIFEM, YUVAFEM) 10 MCG TABS vaginal tablet 1 tablet vaginally q hs x 14 days then twice weekly    gabapentin (NEURONTIN) 100 mg capsule Take 1 capsule (100 mg total) by mouth 3 (three) times a day (Patient not taking: Reported on 5/18/2021)    methocarbamol (ROBAXIN) 750 mg tablet Take 1 tablet (750 mg total) by mouth 2 (two) times a day as needed for muscle spasms (Patient not taking: Reported on 5/18/2021)    Multiple Vitamins-Minerals (MULTIVITAMIN WITH MINERALS) tablet Take 1 tablet by mouth daily   omeprazole (PriLOSEC) 20 mg delayed release capsule Take 1 capsule (20 mg total) by mouth 2 (two) times a day (Patient not taking: Reported on 5/18/2021)    orphenadrine (NORFLEX) 100 mg tablet Take 1 tablet (100 mg total) by mouth daily as needed for muscle spasms (Patient not taking: Reported on 5/18/2021)    traMADol (ULTRAM) 50 mg tablet Take 1 tablet (50 mg total) by mouth every 6 (six) hours as needed for severe pain (Patient not taking: Reported on 5/18/2021)    TURMERIC PO Take 1 capsule by mouth daily      No current facility-administered medications on file prior to visit  She is allergic to iodinated diagnostic agents; morphine; morphine and related; vicodin [hydrocodone-acetaminophen]; effexor [venlafaxine]; and gentamicin       Review of Systems   Constitutional: Negative  HENT: Negative for sore throat and trouble swallowing  Gastrointestinal: Negative  Genitourinary: Negative  Objective:      /60   Pulse 80   Ht 5' 4 5" (1 638 m)   Wt 69 5 kg (153 lb 3 2 oz)   BMI 25 89 kg/m²          Physical Exam  Vitals signs reviewed  Constitutional:       Appearance: Normal appearance  She is normal weight  Neck:      Musculoskeletal: Normal range of motion and neck supple  No muscular tenderness  Cardiovascular:      Rate and Rhythm: Normal rate and regular rhythm  Pulses: Normal pulses  Heart sounds: Normal heart sounds  No murmur  Pulmonary:      Effort: Pulmonary effort is normal  No respiratory distress  Breath sounds: Normal breath sounds  Chest:      Breasts:         Right: No swelling, bleeding, inverted nipple, mass, nipple discharge, skin change or tenderness           Left: No swelling, bleeding, inverted nipple, mass, nipple discharge, skin change or tenderness  Abdominal:      General: There is no distension  Palpations: Abdomen is soft  There is no mass  Tenderness: There is no abdominal tenderness  There is no guarding or rebound  Hernia: No hernia is present  There is no hernia in the left inguinal area or right inguinal area  Genitourinary:     General: Normal vulva  Labia:         Right: No rash, tenderness or lesion  Left: No rash, tenderness or lesion  Vagina: Normal       Cervix: Normal       Uterus: Normal        Adnexa:         Right: No mass, tenderness or fullness  Left: No mass, tenderness or fullness  Lymphadenopathy:      Cervical: No cervical adenopathy  Upper Body:      Right upper body: No supraclavicular, axillary or pectoral adenopathy  Left upper body: No supraclavicular, axillary or pectoral adenopathy  Lower Body: No right inguinal adenopathy  No left inguinal adenopathy  Neurological:      Mental Status: She is alert

## 2021-05-21 LAB
LAB AP GYN PRIMARY INTERPRETATION: NORMAL
Lab: NORMAL

## 2021-05-25 DIAGNOSIS — G47.00 INSOMNIA, UNSPECIFIED TYPE: ICD-10-CM

## 2021-05-25 RX ORDER — SUVOREXANT 15 MG/1
1 TABLET, FILM COATED ORAL
Qty: 30 TABLET | Refills: 0 | Status: SHIPPED | OUTPATIENT
Start: 2021-05-25 | End: 2021-07-12 | Stop reason: SDUPTHER

## 2021-05-25 NOTE — TELEPHONE ENCOUNTER
Medication refill requested: Belsomra  Last office visit: 3/26/21  Next office visit: none  Last refilled: 4/9/21 #30x0  Pharmacy:   [address] [place] [address]  Phone: [phone] Fax: [phone]      Pended: 30x0      Patient would like a call when refilled

## 2021-05-26 DIAGNOSIS — F41.9 ANXIETY: Chronic | ICD-10-CM

## 2021-05-26 RX ORDER — ALPRAZOLAM 0.25 MG/1
TABLET ORAL
Qty: 30 TABLET | Refills: 0 | Status: SHIPPED | OUTPATIENT
Start: 2021-05-26 | End: 2021-11-01 | Stop reason: SDUPTHER

## 2021-05-26 NOTE — TELEPHONE ENCOUNTER
Medication: XANAX 0 25 MG   Last refilled: 12/4/20  Last Office Visit: 3/26/21  Next Office Visit:   Ph[facility]acy: [facility]

## 2021-05-27 ENCOUNTER — TELEPHONE (OUTPATIENT)
Dept: OBGYN CLINIC | Facility: HOSPITAL | Age: 56
End: 2021-05-27

## 2021-05-27 ENCOUNTER — HOSPITAL ENCOUNTER (OUTPATIENT)
Dept: RADIOLOGY | Facility: CLINIC | Age: 56
Discharge: HOME | End: 2021-05-27
Attending: ANESTHESIOLOGY
Payer: COMMERCIAL

## 2021-05-27 VITALS
HEART RATE: 63 BPM | TEMPERATURE: 97.8 F | DIASTOLIC BLOOD PRESSURE: 70 MMHG | RESPIRATION RATE: 20 BRPM | OXYGEN SATURATION: 94 % | SYSTOLIC BLOOD PRESSURE: 109 MMHG

## 2021-05-27 DIAGNOSIS — M50.120 CERVICAL DISC DISORDER WITH RADICULOPATHY OF MID-CERVICAL REGION: ICD-10-CM

## 2021-05-27 PROCEDURE — 62321 NJX INTERLAMINAR CRV/THRC: CPT | Performed by: ANESTHESIOLOGY

## 2021-05-27 PROCEDURE — A9585 GADOBUTROL INJECTION: HCPCS | Performed by: ANESTHESIOLOGY

## 2021-05-27 RX ORDER — METHYLPREDNISOLONE ACETATE 80 MG/ML
80 INJECTION, SUSPENSION INTRA-ARTICULAR; INTRALESIONAL; INTRAMUSCULAR; PARENTERAL; SOFT TISSUE ONCE
Status: COMPLETED | OUTPATIENT
Start: 2021-05-27 | End: 2021-05-27

## 2021-05-27 RX ADMIN — METHYLPREDNISOLONE ACETATE 80 MG: 80 INJECTION, SUSPENSION INTRA-ARTICULAR; INTRALESIONAL; INTRAMUSCULAR; PARENTERAL; SOFT TISSUE at 13:31

## 2021-05-27 RX ADMIN — GADOBUTROL 1 ML: 604.72 INJECTION INTRAVENOUS at 13:29

## 2021-05-27 NOTE — DISCHARGE INSTR - LAB
Epidural Steroid Injection   WHAT YOU NEED TO KNOW:   An epidural steroid injection (BERTHA) is a procedure to inject steroid medicine into the epidural space  The epidural space is between your spinal cord and vertebrae  Steroids reduce inflammation and fluid buildup in your spine that may be causing pain  You may be given pain medicine along with the steroids  ACTIVITY  · Do not drive or operate machinery today  · No strenuous activity today - bending, lifting, etc   · You may resume normal activites starting tomorrow - start slowly and as tolerated  · You may shower today, but no tub baths or hot tubs  · You may have numbness for several hours from the local anesthetic  Please use caution and common sense, especially with weight-bearing activities  CARE OF THE INJECTION SITE  · If you have soreness or pain, apply ice to the area today (20 minutes on/20 minutes off)  · Starting tomorrow, you may use warm, moist heat or ice if needed  · You may have an increase or change in your discomfort for 36-48 hours after your treatment  · Apply ice and continue with any pain medication you have been prescribed  · Notify the [facility] if you have any of the following: redness, drainage, swelling, headache, stiff neck or fever above 100°F     SPECIAL INSTRUCTIONS  · Our office will contact you in approximately 7 days for a progress report  MEDICATIONS  · Continue to take all routine medications  · Our office may have instructed you to hold some medications  As no general anesthesia was used in today's procedure, you should not experience any side effects related to anesthesia  If you have a problem specifically related to your procedure, please call our office at [phone]  Problems not related to your procedure should be directed to your primary care physician

## 2021-05-27 NOTE — TELEPHONE ENCOUNTER
Patient has an appointment schedule with Dr Zachariah Romeo on 6/15 requesting injection  Patient was seen today [facility] and had a cervical epidural steroid injection  She is asking, if is that enough time between them (injections)?   Please call back # [phone]

## 2021-05-27 NOTE — H&P
History of Present Illness: The patient is a 48 y o  female who presents with complaints of neck pain secondary cervical disc bulging is here today for cervical epidural steroid injection      Patient Active Problem List   Diagnosis    DJD (degenerative joint disease)    Status post total replacement of left hip    Esophageal reflux    Anxiety    Benign familial tremor    Cervical radiculopathy    Chronic lower back pain    Dense breasts    DJD (degenerative joint disease), cervical    Endometrial polyp    Endometriosis    Episodic tension-type headache, not intractable    Hemangioma of skin    Herniated cervical disc    Herpes simplex infection    Hyperlipidemia    Lumbar degenerative disc disease    Menopause    BRENDA (obstructive sleep apnea)    Pelvic and perineal pain    Pelvic congestion syndrome    Postmenopausal bleeding    Sleep apnea    Acute non-recurrent sinusitis    Bilateral carpal tunnel syndrome    Chronic fatigue    Colon cancer screening    Chronic midline thoracic back pain    Knee pain, chronic    Insomnia    IT band syndrome    Biceps tendinitis    Acromioclavicular joint arthritis    Bee sting reaction    Tendinitis of right rotator cuff    Subacromial bursitis of right shoulder joint       Past Medical History:   Diagnosis Date    Acid reflux     Allergic reaction to dye     Resolved - [id number]    Aneurysm of thoracic aorta (HCC)     last assess 2017    Anxiety     Arthritis     Atrophic vaginitis     Benign familial tremor     Last assessed - [id number]    Cervical back pain with evidence of disc disease     Chronic otitis media of right ear     Last assessed - 29Jan2015    Contact dermatitis     Last assessed - [id number]    Dermatitis     Disturbance of smell     Last assessed - 29Apr2013    DJD (degenerative joint disease)     Endometriosis     Last assessed - [id number]    Esophageal reflux     Last assessed - 03NSS0200    Extremity pain RUE    Folliculitis     Last assessed - [id number]    Headache     Herpes simplex     High cholesterol     Hyperlipidemia     Last assessed - [id number]    Lateral epicondylitis of left elbow     Last assessed - [id number]    Lumbar back pain     Myofascial pain syndrome     Neoplasm of skin     Last assessed - [id number]    Osteoarthritis     PONV (postoperative nausea and vomiting)     severe    Postmenopausal atrophic vaginitis     Last assessed - [id number]    Prophylactic antibiotic     Last assessed - [id number]    Rotator cuff disorder     Sleep apnea     mild no cpap    Taste impairment     taste disturbances - Last assessed - [id number]    Tremor, hereditary, benign     Wound, open, finger     Last assessed - [id number]       Past Surgical History:   Procedure Laterality Date    COLONOSCOPY      DIAGNOSTIC LAPAROSCOPY      LAPAROSCOPIC ENDOMETRIOSIS FULGURATION      Laparoscop excis endometriotic tissue uterosacral ligaments    [place] COLONOSCOPY FLX DX W/COLLJ SPEC WHEN PFRMD N/A 12/6/2018    Procedure: COLONOSCOPY;  Surgeon: Tara Pandya DO;  Location: [facility]; Service: Gastroenterology    [place] ESOPHAGOGASTRODUODENOSCOPY TRANSORAL DIAGNOSTIC N/A 12/6/2018    Procedure: ESOPHAGOGASTRODUODENOSCOPY (EGD); Surgeon: Tara Pandya DO;  Location: [facility];   Service: Gastroenterology    [place] TOTAL HIP ARTHROPLASTY Left 5/16/2016    Procedure: ANTERIOR TOTAL HIP ARTHROPLASTY ;  Surgeon: Jaxson Castellanos MD;  [facility]: [facility];  Service: Orthopedics    REVISION TOTAL HIP ARTHROPLASTY  06/15/2011    TOTAL HIP ARTHROPLASTY Right     TOTAL HIP ARTHROPLASTY  10/20/2010    UPPER GASTROINTESTINAL ENDOSCOPY           Current Outpatient Medications:     ALPRAZolam (XANAX) 0 25 mg tablet, TAKE ONE TABLET BY MOUTH EVERY DAYAT BEDTIME AS NEEDED FOR ANXIETY, Disp: 30 tablet, Rfl: 0    ascorbic acid (VITAMIN C) 500 mg tablet, Take 500 mg by mouth daily , Disp: , Rfl:     Calcium-Vitamin D (CALTRATE 600 PLUS-VIT D PO), Take 600 mg by mouth daily , Disp: , Rfl:     clindamycin (CLEOCIN T) 1 % external solution, apply topically to affected area AS NEEDED FOR FLARES OF THE SCALP, Disp: , Rfl: 0    estradiol (VAGIFEM, YUVAFEM) 10 MCG TABS vaginal tablet, 1 tablet vaginally q hs x 14 days then twice weekly, Disp: 40 tablet, Rfl: 3    gabapentin (NEURONTIN) 100 mg capsule, Take 1 capsule (100 mg total) by mouth 3 (three) times a day (Patient not taking: Reported on 5/18/2021), Disp: 90 capsule, Rfl: 1    methocarbamol (ROBAXIN) 750 mg tablet, Take 1 tablet (750 mg total) by mouth 2 (two) times a day as needed for muscle spasms (Patient not taking: Reported on 5/18/2021), Disp: 180 tablet, Rfl: 1    omeprazole (PriLOSEC) 20 mg delayed release capsule, Take 1 capsule (20 mg total) by mouth 2 (two) times a day (Patient not taking: Reported on 5/18/2021), Disp: 60 capsule, Rfl: 5    orphenadrine (NORFLEX) 100 mg tablet, Take 1 tablet (100 mg total) by mouth daily as needed for muscle spasms (Patient not taking: Reported on 5/18/2021), Disp: 30 tablet, Rfl: 1    sertraline (ZOLOFT) 25 mg tablet, 1/2 tab daily x 1 week, then 1 tab daily x 1 week, then 1 1/2 tabs daily x 1 week, then 2 tabs daily (Patient taking differently: 1/2 tab daily), Disp: 60 tablet, Rfl: 5    Suvorexant (Belsomra) 15 MG TABS, Take 1 tablet (15 mg total) by mouth daily at bedtime, Disp: 30 tablet, Rfl: 0    traMADol (ULTRAM) 50 mg tablet, Take 1 tablet (50 mg total) by mouth every 6 (six) hours as needed for severe pain (Patient not taking: Reported on 5/18/2021), Disp: 20 tablet, Rfl: 0    TURMERIC PO, Take 1 capsule by mouth daily , Disp: , Rfl:     valACYclovir (VALTREX) 1,000 mg tablet, TAKE 1 TABLET BY MOUTH DAILY AS NEEDED FOR ORAL LESION FOR UP TO 4 DAYS, Disp: 4 tablet, Rfl: 5    zinc gluconate 50 mg tablet, Take 50 mg by mouth daily  , Disp: , Rfl:     Current Facility-Administered Medications:     Gadobutrol injection (SINGLE-DOSE) SOLN 5 mL, 5 mL, Intravenous, Once, Shannon Hoover MD    methylPREDNISolone acetate (DEPO-MEDROL) injection 80 mg, 80 mg, Epidural, Once, Shannon Hoover MD    Allergies   Allergen Reactions    Iodinated Diagnostic Agents Swelling and Eye Swelling     IVP dye specifically; swollen eye lid shut    Morphine Vomiting     Reaction Date: [id number];     Morphine And Related      DENIES ALLERGY RELATES N/V TO ANESTHESIA    Vicodin [Hydrocodone-Acetaminophen]      No allergy to tylenol   Does have N/V to vicoden    Effexor [Venlafaxine] Arthralgia    Gentamicin Rash     Reaction Date: [id number];        Physical Exam:   Vitals:    05/27/21 1312   BP: 120/77   Pulse: 57   Resp: 20   Temp: 97 8 °F (36 6 °C)   SpO2: 95%     General: Awake, Alert, Oriented x 3, Mood and affect appropriate  Respiratory: Respirations even and unlabored  Cardiovascular: Peripheral pulses intact; no edema  Musculoskeletal Exam:  Neck Pain    ASA Score: 2    Patient/Chart Verification  Patient ID Verified: Verbal  Consents Confirmed: To be obtained in the Pre-Procedure area  H&P( within 30 days) Verified: To be obtained in the Pre-Procedure area  Allergies Reviewed: Yes  Anticoag/NSAID held?: Yes(no excedrin or ASA in 7 days or diclofenac in 1 month )  Currently on antibiotics?: No    Assessment:   1   Cervical disc disorder with radiculopathy of mid-cervical region        Plan: KIT

## 2021-06-03 ENCOUNTER — TELEPHONE (OUTPATIENT)
Dept: PAIN MEDICINE | Facility: CLINIC | Age: 56
End: 2021-06-03

## 2021-06-09 NOTE — TELEPHONE ENCOUNTER
1st attempt @ 2nd week call back    Left v/m for patient to return call and give update on pain post procedure

## 2021-06-11 NOTE — TELEPHONE ENCOUNTER
FQ RAMÓNI:    RN s/w pt and she said that her symptoms are a little better and right now she's having more issues with her Rt shoulder again and wants to hold off on the repeat neck inj  She has an appt for 6/15 w/ Dr Daron Robins to re-eval her Rt shoulder, they told her he might give her a steroid inj that day as well  RN did explain that she can c/b and ask to schedule repeat neck inj if that pain begins to worsen and there must be at least 2 weeks between steroid inj  Pt understood

## 2021-06-15 ENCOUNTER — OFFICE VISIT (OUTPATIENT)
Dept: OBGYN CLINIC | Facility: MEDICAL CENTER | Age: 56
End: 2021-06-15
Payer: COMMERCIAL

## 2021-06-15 VITALS
TEMPERATURE: 97.8 F | HEIGHT: 65 IN | DIASTOLIC BLOOD PRESSURE: 80 MMHG | BODY MASS INDEX: 25.49 KG/M2 | HEART RATE: 79 BPM | SYSTOLIC BLOOD PRESSURE: 134 MMHG | WEIGHT: 153 LBS

## 2021-06-15 DIAGNOSIS — M19.011 GLENOHUMERAL ARTHRITIS, RIGHT: Primary | ICD-10-CM

## 2021-06-15 PROCEDURE — 20610 DRAIN/INJ JOINT/BURSA W/O US: CPT | Performed by: ORTHOPAEDIC SURGERY

## 2021-06-15 PROCEDURE — 99213 OFFICE O/P EST LOW 20 MIN: CPT | Performed by: ORTHOPAEDIC SURGERY

## 2021-06-15 NOTE — PROGRESS NOTES
Orthopaedic Surgery - Office Note  Germán Mckeon [de-identified]48 y o  female)   : 1965   MRN: [id number]  Encounter Date: 6/15/2021    Chief Complaint   Patient presents with    Right Shoulder - Follow-up       Assessment / Plan  R glenohumeral arthritis  Cervical radiculopathy    · We had a long discussion about treatment for osteoarthritis of the shoulder including surgical versus conservative  At this time we did discuss that due to her arthritis she would most likely not be a good candidate for shoulder arthroscopy with consideration of rotator cuff repair versus debridement  We did discuss that the next step of surgical treatment would most likely be shoulder replacement which she is still very young for  · Continue with conservative treatment including PT for focus on stretching and light resistance training  · Continue with ice and other modalities and analgesics/ NSAIDs as needed  · After discussion of risk and benefits the patient did agree to proceed with a right glenohumeral steroid injection  This was administered sterilely and tolerated well we did discuss that going forward would like to space these out 3-4 months apart as needed  Return if symptoms worsen or fail to improve  History of Present Illness  Maria Alejandra Daphne Christensen is a 48 y o  female who presents for evaluation of right shoulder pain which has been ongoing for years  She was seen Dr Geronimo Ritchie for her shoulder in the past and her last visit on 2020 she did receive a steroid injection which she helps give her decent relief for an extended period of time  She also had an MRI completed at that time which showed rotator cuff tendinitis and partial tearing in addition to arthritic changes  On 2021 the patient did see her pain management doctor Dea Penaloza who injected her cervical spine and she feels that this did help relieve some of her neck pain  She continues to have a lot of tenderness in her traps and over her scapula at this time  Her shoulder pain seems to be mostly posterior  She does have tightness in her shoulder along with the pain  Occasions it does radiate down her arm and she has been diagnosed with a cervical radiculopathy in the past   She does feel that this pain though is different than the pain that radiates from her cervical spine  She has gone through a course of physical therapy for both her cervical spine and shoulder and continues with these exercises on her own at home  She does  Feel that the therapy does improve her discomfort not completely and at time she does have flares  She did have a subacromial injection prior to the glenohumeral junction by Dr Fide Miller in the past   She does take gabapentin, Robaxin, tramadol and anti-inflammatories for her pain which do give her some relief but the tramadol does cause her headache  She is currently not experiencing any distal neuro vascular symptoms  Review of Systems  Pertinent items are noted in HPI  All other systems were reviewed and are negative  Physical Exam  /80   Pulse 79   Temp 97 8 °F (36 6 °C)   Ht 5' 4 5" (1 638 m)   Wt 69 4 kg (153 lb)   BMI 25 86 kg/m²   Cons: Appears well  No apparent distress  Psych: Alert  Oriented x3  Mood and affect normal   Eyes: PERRLA, EOMI  Resp: Normal effort  No audible wheezing or stridor  CV: Palpable pulse  No discernable arrhythmia  No LE edema  Lymph:  No palpable cervical, axillary, or inguinal lymphadenopathy  Skin: Warm  No palpable masses  No visible lesions  Neuro: Normal muscle tone  Normal and symmetric DTR's  Right Shoulder Exam  Alignment / Posture:  Normal shoulder posture  Normal scapular position  Inspection:  No swelling  No erythema  No deformity  Palpation:  No tenderness  ROM:  Shoulder °  Shoulder ER 80°  Shoulder IR L5  Strength:  5/5 supraspinatus, infraspinatus, and subscapularis  Stability:  No objective shoulder instability  Tests: (+) Neer  (+) Belly press  (+) [place]  (+) [place]  (-) [place]  (-) Speed  Neurovascular:  Sensation intact in Ax/R/M/U nerve distributions  Sensation intact in all digital nerve distributions  Fingers warm and perfused  Studies Reviewed  I have personally reviewed pertinent films in PACS  XR of right shoulder - From 08/24/2020 shows mild spurring on the inferior aspect of the glenoid and humeral head  Moderate narrowing with spurring on [facility] noted  Mild narrowing in the glenohumeral joint  MRI of right shoulder - From 08/30/2020 shows  low-grade undersurface tear of the posterior fibers of the supraspinatus spanning 5 mm in anterior-posterior dimension less prominent when compared to the prior exam done in 2015  The remainder of the supraspinatus tendon did demonstrate moderate tendinosis there is mild infraspinatus tendinosis with low-grade interstitial tearing and mild subscapular tendinosis  He moderate AC joint and glenohumeral   Osteoarthritis    Large joint arthrocentesis: R glenohumeral  [place] Protocol:  Consent: Verbal consent obtained  Consent given by: patient    Procedure Details  Location: shoulder - R glenohumeral  Needle size: 22 G  Approach: anterior  Medications administered: 4 mL bupivacaine 0 25 %; 1 mL methylPREDNISolone acetate 40 mg/mL    Patient tolerance: patient tolerated the procedure well with no immediate complications  Dressing:  Sterile dressing applied          Medical, Surgical, Family, and Social History  The patient's medical history, family history, and social history, were reviewed and updated as appropriate      Past Medical History:   Diagnosis Date    Acid reflux     Allergic reaction to dye     Resolved - [id number]    Aneurysm of thoracic aorta (HCC)     last assess 2017    Anxiety     Arthritis     Atrophic vaginitis     Benign familial tremor     Last assessed - 54Fph1346    Cervical back pain with evidence of disc disease     Chronic otitis media of right ear Last assessed - 83OLD8301    Contact dermatitis     Last assessed - 92SBY8306    Dermatitis     Disturbance of smell     Last assessed - 03Yht5521    DJD (degenerative joint disease)     Endometriosis     Last assessed - 02MEJ9696    Esophageal reflux     Last assessed - 88Svo3343    Extremity pain     RUE    Folliculitis     Last assessed - 13Qjb7413    Headache     Herpes simplex     High cholesterol     Hyperlipidemia     Last assessed - 91OMG2584    Lateral epicondylitis of left elbow     Last assessed - 74TYE2642    Lumbar back pain     Myofascial pain syndrome     Neoplasm of skin     Last assessed - 04Rsd4972    Osteoarthritis     PONV (postoperative nausea and vomiting)     severe    Postmenopausal atrophic vaginitis     Last assessed - 45XCM7464    Prophylactic antibiotic     Last assessed - 49ALG7844    Rotator cuff disorder     Sleep apnea     mild no cpap    Taste impairment     taste disturbances - Last assessed - 65Onw5415    Tremor, hereditary, benign     Wound, open, finger     Last assessed - 88GUW0816       Past Surgical History:   Procedure Laterality Date    COLONOSCOPY      DIAGNOSTIC LAPAROSCOPY      LAPAROSCOPIC ENDOMETRIOSIS FULGURATION      Laparoscop excis endometriotic tissue uterosacral ligaments    [place] COLONOSCOPY FLX DX W/COLLJ SPEC WHEN PFRMD N/A 12/6/2018    Procedure: COLONOSCOPY;  Surgeon: Paty Maldonado DO;  Location: [facility] GI [facility]; Service: Gastroenterology    [place] ESOPHAGOGASTRODUODENOSCOPY TRANSORAL DIAGNOSTIC N/A 12/6/2018    Procedure: ESOPHAGOGASTRODUODENOSCOPY (EGD); Surgeon: Paty Maldonado DO;  Location: [facility] GI [facility];   Service: Gastroenterology    [place] TOTAL HIP ARTHROPLASTY Left 5/16/2016    Procedure: ANTERIOR TOTAL HIP ARTHROPLASTY ;  Surgeon: Jc Benavidez MD;  [facility];  Service: Orthopedics    REVISION TOTAL HIP ARTHROPLASTY  06/15/2011    TOTAL HIP ARTHROPLASTY Right     TOTAL HIP ARTHROPLASTY  10/20/2010    UPPER GASTROINTESTINAL ENDOSCOPY         Family History   Problem Relation Age of Onset    Hypertension Mother     Stroke Mother     Atrial fibrillation Mother     Diverticulitis Mother         of colon    Esophageal cancer Father 80    Arthritis Father     Stroke Father         Stroke syndrome    Arthritis Brother     Other Brother         Esophageal reflux    Other Family         Back pain    Breast cancer Family     Colon cancer Family     Diabetes Family     No Known Problems Sister     No Known Problems Maternal Grandmother     No Known Problems Maternal Grandfather     Colon cancer Paternal Grandmother 66    No Known Problems Paternal Grandfather     No Known Problems Sister     No Known Problems Maternal Aunt     Breast cancer Maternal Aunt 48    Breast cancer Other         age unknown    Breast cancer Other         unknown age   [employer] Prostate cancer Paternal Uncle [de-identified]    Breast cancer Maternal Uncle 72       Social History     Occupational History    Occupation: Medical Professional   Tobacco Use    Smoking status: Never Smoker    Smokeless tobacco: Never Used   Vaping Use    Vaping Use: Never used   Substance and Sexual Activity    Alcohol use: Yes     Comment: rare    Drug use: No     Comment: Denied history of drug use    Sexual activity: Yes     Partners: Male     Birth control/protection: None, Post-menopausal       Allergies   Allergen Reactions    Iodinated Diagnostic Agents Swelling and Eye Swelling     IVP dye specifically; swollen eye lid shut    Morphine Vomiting     Reaction Date: [id number];     Morphine And Related      DENIES ALLERGY RELATES N/V TO ANESTHESIA    Vicodin [Hydrocodone-Acetaminophen]      No allergy to tylenol   Does have N/V to vicoden    Effexor [Venlafaxine] Arthralgia    Gentamicin Rash     Reaction Date: [id number];          Current Outpatient Medications:     ALPRAZolam (XANAX) 0 25 mg tablet, TAKE ONE TABLET BY MOUTH EVERY DAYAT BEDTIME AS NEEDED FOR ANXIETY, Disp: 30 tablet, Rfl: 0    ascorbic acid (VITAMIN C) 500 mg tablet, Take 500 mg by mouth daily , Disp: , Rfl:     Calcium-Vitamin D (CALTRATE 600 PLUS-VIT D PO), Take 600 mg by mouth daily , Disp: , Rfl:     clindamycin (CLEOCIN T) 1 % external solution, apply topically to affected area AS NEEDED FOR FLARES OF THE SCALP, Disp: , Rfl: 0    estradiol (VAGIFEM, YUVAFEM) 10 MCG TABS vaginal tablet, 1 tablet vaginally q hs x 14 days then twice weekly, Disp: 40 tablet, Rfl: 3    gabapentin (NEURONTIN) 100 mg capsule, Take 1 capsule (100 mg total) by mouth 3 (three) times a day (Patient not taking: Reported on 5/18/2021), Disp: 90 capsule, Rfl: 1    methocarbamol (ROBAXIN) 750 mg tablet, Take 1 tablet (750 mg total) by mouth 2 (two) times a day as needed for muscle spasms (Patient not taking: Reported on 5/18/2021), Disp: 180 tablet, Rfl: 1    omeprazole (PriLOSEC) 20 mg delayed release capsule, Take 1 capsule (20 mg total) by mouth 2 (two) times a day (Patient not taking: Reported on 5/18/2021), Disp: 60 capsule, Rfl: 5    orphenadrine (NORFLEX) 100 mg tablet, Take 1 tablet (100 mg total) by mouth daily as needed for muscle spasms (Patient not taking: Reported on 5/18/2021), Disp: 30 tablet, Rfl: 1    sertraline (ZOLOFT) 25 mg tablet, 1/2 tab daily x 1 week, then 1 tab daily x 1 week, then 1 1/2 tabs daily x 1 week, then 2 tabs daily (Patient taking differently: 1/2 tab daily), Disp: 60 tablet, Rfl: 5    Suvorexant (Belsomra) 15 MG TABS, Take 1 tablet (15 mg total) by mouth daily at bedtime, Disp: 30 tablet, Rfl: 0    traMADol (ULTRAM) 50 mg tablet, Take 1 tablet (50 mg total) by mouth every 6 (six) hours as needed for severe pain (Patient not taking: Reported on 5/18/2021), Disp: 20 tablet, Rfl: 0    TURMERIC PO, Take 1 capsule by mouth daily , Disp: , Rfl:     valACYclovir (VALTREX) 1,000 mg tablet, TAKE 1 TABLET BY MOUTH DAILY AS NEEDED FOR ORAL LESION FOR UP TO 4 DAYS, Disp: 4 tablet, Rfl: 5    zinc gluconate 50 mg tablet, Take 50 mg by mouth daily  , Disp: , Rfl:       Gavin Payne PA-C    Scribe Attestation    I,:   am acting as a scribe while in the presence of the attending physician :       I,:   personally performed the services described in this documentation    as scribed in my presence :

## 2021-07-01 ENCOUNTER — HOSPITAL ENCOUNTER (OUTPATIENT)
Dept: MAMMOGRAPHY | Facility: HOSPITAL | Age: 56
Discharge: HOME | End: 2021-07-01
Attending: FAMILY MEDICINE
Payer: COMMERCIAL

## 2021-07-01 VITALS — WEIGHT: 153 LBS | BODY MASS INDEX: 25.49 KG/M2 | HEIGHT: 65 IN

## 2021-07-01 DIAGNOSIS — Z12.31 SCREENING MAMMOGRAM FOR HIGH-RISK PATIENT: ICD-10-CM

## 2021-07-01 PROCEDURE — 77067 SCR MAMMO BI INCL CAD: CPT

## 2021-07-01 PROCEDURE — 77063 BREAST TOMOSYNTHESIS BI: CPT

## 2021-07-02 ENCOUNTER — TELEMEDICINE (OUTPATIENT)
Dept: FAMILY MEDICINE CLINIC | Facility: CLINIC | Age: 56
End: 2021-07-02
Payer: COMMERCIAL

## 2021-07-02 VITALS — WEIGHT: 150 LBS | BODY MASS INDEX: 24.99 KG/M2 | TEMPERATURE: 97.9 F | HEIGHT: 65 IN

## 2021-07-02 DIAGNOSIS — R05.9 COUGH: Primary | ICD-10-CM

## 2021-07-02 PROCEDURE — 99213 OFFICE O/P EST LOW 20 MIN: CPT | Performed by: FAMILY MEDICINE

## 2021-07-02 RX ORDER — ALBUTEROL SULFATE 90 UG/1
2 AEROSOL, METERED RESPIRATORY (INHALATION) EVERY 4 HOURS PRN
Qty: 8.5 G | Refills: 0 | Status: SHIPPED | OUTPATIENT
Start: 2021-07-02

## 2021-07-02 NOTE — PROGRESS NOTES
Virtual Regular Visit      Assessment/Plan:    Problem List Items Addressed This Visit     None      Visit Diagnoses     Cough    -  Primary  Suspect due to airway spasticity  Trial of ventolin  Call if symptoms don't improve  Hold on abx as pt isn't actively ill anymore  Suspect this is post-infectious    Relevant Medications    albuterol (Ventolin HFA) 90 mcg/act inhaler               Reason for visit is   Chief Complaint   Patient presents with    Cough     states that she is having coughing fits and that is is starting to mess with her work  No fevers     Sore Throat     X2 weeks ago this is come     Virtual Regular Visit        Encounter provider Jagdeep Hale DO    Provider located at [address] [place] [address],[address]  [address]  [address] [facility] [address] [phone]      Recent Visits  No visits were found meeting these conditions  Showing recent visits within past 7 days and meeting all other requirements  Today's Visits  Date Type Provider Dept   07/02/21 Telemedicine Jagdeep Hale DO [facility]   Showing today's visits and meeting all other requirements  Future Appointments  No visits were found meeting these conditions  Showing future appointments within next 150 days and meeting all other requirements       The patient was identified by name and date of birth  Daryl Omalley was informed that this is a telemedicine visit and that the visit is being conducted through [facility] and patient was informed that this is a secure, HIPAA-compliant platform  She agrees to proceed     My office door was closed  No one else was in the room  She acknowledged consent and understanding of privacy and security of the video platform  The patient has agreed to participate and understands they can discontinue the visit at any time  Patient is aware this is a billable service  Cindy Woodson is a 48 y o  female who presents c/o ST and cough, congestion 2 weeks ago    ST and congestion gone, but she has had episodes of coughing fits only at work for the last few days  No post-tussive emesis  Feels tickle in throat and then can't stop  No fevers  Feels fine other that spells  No pleuritic pain        HPI     Past Medical History:   Diagnosis Date    Acid reflux     Allergic reaction to dye     Resolved - [id number]    Aneurysm of thoracic aorta (HCC)     last assess 2017    Anxiety     Arthritis     Atrophic vaginitis     Benign familial tremor     Last assessed - 50Hkr6692    Cervical back pain with evidence of disc disease     Chronic otitis media of right ear     Last assessed - 16Ctc3773    Contact dermatitis     Last assessed - 99DPM1661    Dermatitis     Disturbance of smell     Last assessed - 97Akb6472    DJD (degenerative joint disease)     Endometriosis     Last assessed - 96UDM0104    Esophageal reflux     Last assessed - 46Map5526    Extremity pain     RUE    Folliculitis     Last assessed - 09Pfk3878    Headache     Herpes simplex     High cholesterol     Hyperlipidemia     Last assessed - 80VPZ3972    Lateral epicondylitis of left elbow     Last assessed - 07OAW8193    Lumbar back pain     Myofascial pain syndrome     Neoplasm of skin     Last assessed - 44Ord5720    Osteoarthritis     PONV (postoperative nausea and vomiting)     severe    Postmenopausal atrophic vaginitis     Last assessed - 21SKJ4875    Prophylactic antibiotic     Last assessed - 25DKI0016    Rotator cuff disorder     Sleep apnea     mild no cpap    Taste impairment     taste disturbances - Last assessed - 49Kfc0402    Tremor, hereditary, benign     Wound, open, finger     Last assessed - 78DRF0077       Past Surgical History:   Procedure Laterality Date    COLONOSCOPY      DIAGNOSTIC LAPAROSCOPY      LAPAROSCOPIC ENDOMETRIOSIS FULGURATION      Laparoscop excis endometriotic tissue uterosacral ligaments    [place] COLONOSCOPY FLX DX W/COLLJ SPEC WHEN PFRMD N/A 12/6/2018 Procedure: COLONOSCOPY;  Surgeon: Kelsey Haddad DO;  Location: [facility] GI LAB; Service: Gastroenterology    [place] ESOPHAGOGASTRODUODENOSCOPY TRANSORAL DIAGNOSTIC N/A 12/6/2018    Procedure: ESOPHAGOGASTRODUODENOSCOPY (EGD); Surgeon: Kelsey Haddad DO;  Location: [facility] GI LAB;   Service: Gastroenterology    [place] TOTAL HIP ARTHROPLASTY Left 5/16/2016    Procedure: ANTERIOR TOTAL HIP ARTHROPLASTY ;  Surgeon: Ronny Guerrero MD;  Location:  MAIN OR;  Service: Orthopedics    REVISION TOTAL HIP ARTHROPLASTY  06/15/2011    TOTAL HIP ARTHROPLASTY Right     TOTAL HIP ARTHROPLASTY  10/20/2010    UPPER GASTROINTESTINAL ENDOSCOPY         Current Outpatient Medications   Medication Sig Dispense Refill    ALPRAZolam (XANAX) 0 25 mg tablet TAKE ONE TABLET BY MOUTH EVERY DAYAT BEDTIME AS NEEDED FOR ANXIETY 30 tablet 0    ascorbic acid (VITAMIN C) 500 mg tablet Take 500 mg by mouth daily       Calcium-Vitamin D (CALTRATE 600 PLUS-VIT D PO) Take 600 mg by mouth daily       clindamycin (CLEOCIN T) 1 % external solution apply topically to affected area AS NEEDED FOR FLARES OF THE SCALP  0    estradiol (VAGIFEM, YUVAFEM) 10 MCG TABS vaginal tablet 1 tablet vaginally q hs x 14 days then twice weekly 40 tablet 3    gabapentin (NEURONTIN) 100 mg capsule Take 1 capsule (100 mg total) by mouth 3 (three) times a day 90 capsule 1    methocarbamol (ROBAXIN) 750 mg tablet Take 1 tablet (750 mg total) by mouth 2 (two) times a day as needed for muscle spasms 180 tablet 1    omeprazole (PriLOSEC) 20 mg delayed release capsule Take 1 capsule (20 mg total) by mouth 2 (two) times a day 60 capsule 5    orphenadrine (NORFLEX) 100 mg tablet Take 1 tablet (100 mg total) by mouth daily as needed for muscle spasms 30 tablet 1    sertraline (ZOLOFT) 25 mg tablet 1/2 tab daily x 1 week, then 1 tab daily x 1 week, then 1 1/2 tabs daily x 1 week, then 2 tabs daily (Patient taking differently: 1/2 tab daily) 60 tablet 5    Suvorexant (Belsomra) 15 MG TABS Take 1 tablet (15 mg total) by mouth daily at bedtime 30 tablet 0    traMADol (ULTRAM) 50 mg tablet Take 1 tablet (50 mg total) by mouth every 6 (six) hours as needed for severe pain 20 tablet 0    TURMERIC PO Take 1 capsule by mouth daily       valACYclovir (VALTREX) 1,000 mg tablet TAKE 1 TABLET BY MOUTH DAILY AS NEEDED FOR ORAL LESION FOR UP TO 4 DAYS 4 tablet 5    zinc gluconate 50 mg tablet Take 50 mg by mouth daily   albuterol (Ventolin HFA) 90 mcg/act inhaler Inhale 2 puffs every 4 (four) hours as needed for wheezing 8 5 g 0     No current facility-administered medications for this visit  Allergies   Allergen Reactions    Iodinated Diagnostic Agents Swelling and Eye Swelling     IVP dye specifically; swollen eye lid shut    Morphine Vomiting     Reaction Date: [id number];     Morphine And Related      DENIES ALLERGY RELATES N/V TO ANESTHESIA    Vicodin [Hydrocodone-Acetaminophen]      No allergy to tylenol   Does have N/V to vicoden    Effexor [Venlafaxine] Arthralgia    Gentamicin Rash     Reaction Date: [id number];        Review of Systems  See hpi    Video Exam    Vitals:    07/02/21 1344   Temp: 97 9 °F (36 6 °C)   Weight: 68 kg (150 lb)   Height: 5' 4 5" (1 638 m)       Physical Exam  Constitutional:       Appearance: She is well-developed  HENT:      Head: Normocephalic and atraumatic  Eyes:      Extraocular Movements: Extraocular movements intact  Conjunctiva/sclera: Conjunctivae normal    Pulmonary:      Effort: Pulmonary effort is normal  No respiratory distress  Neurological:      Mental Status: She is alert and oriented to person, place, and time  Cranial Nerves: No cranial nerve deficit  I spent 10 minutes directly with the patient during this visit      VIRTUAL VISIT DISCLAIMER    [address] acknowledges that she has consented to an online visit or consultation   She understands that the online visit is based solely on information provided by her, and that, in the absence of a face-to-face physical evaluation by the physician, the diagnosis she receives is both limited and provisional in terms of accuracy and completeness  This is not intended to replace a full medical face-to-face evaluation by the physician  Maria Alejandra Regalado understands and accepts these terms

## 2021-07-06 DIAGNOSIS — M15.9 PRIMARY OSTEOARTHRITIS INVOLVING MULTIPLE JOINTS: ICD-10-CM

## 2021-07-06 RX ORDER — ORPHENADRINE CITRATE 100 MG/1
100 TABLET, EXTENDED RELEASE ORAL DAILY PRN
Qty: 30 TABLET | Refills: 1 | Status: SHIPPED | OUTPATIENT
Start: 2021-07-06

## 2021-07-06 NOTE — TELEPHONE ENCOUNTER
Medication refill requested: orphenadrine   Last office visit: 7/2/21  Next office visit: none  Last refilled: 5/19/20 #30x1  Pharmacy:   [facility] [address] [place] [address], [address] [place], [place] [address] [address]  Phone: [phone] Fax: [phone]    Pended: 30x1

## 2021-07-07 ENCOUNTER — TELEPHONE (OUTPATIENT)
Dept: FAMILY MEDICINE CLINIC | Facility: CLINIC | Age: 56
End: 2021-07-07

## 2021-07-07 DIAGNOSIS — Z29.9 PREVENTIVE MEASURE: Primary | ICD-10-CM

## 2021-07-07 NOTE — TELEPHONE ENCOUNTER
Pt called instephanie if her blood work from October would be sufficient for caring starts with you and if not, can Dr Kerry Gilmore order new blood work

## 2021-07-12 DIAGNOSIS — G47.00 INSOMNIA, UNSPECIFIED TYPE: ICD-10-CM

## 2021-07-12 NOTE — TELEPHONE ENCOUNTER
Medication refill requested: Suvorexant (Belsomra) 15 MG TABS  Last office visit:07/02/2021  Next office visit: None   Last refilled:05/25/2021  Pharmacy: [address] [place] [address] Mail Order     Pended: 69X0

## 2021-07-13 RX ORDER — SUVOREXANT 15 MG/1
1 TABLET, FILM COATED ORAL
Qty: 30 TABLET | Refills: 0 | Status: SHIPPED | OUTPATIENT
Start: 2021-07-13 | End: 2021-08-31 | Stop reason: SDUPTHER

## 2021-07-22 ENCOUNTER — OFFICE VISIT (OUTPATIENT)
Dept: FAMILY MEDICINE CLINIC | Facility: CLINIC | Age: 56
End: 2021-07-22
Payer: COMMERCIAL

## 2021-07-22 VITALS
BODY MASS INDEX: 25.86 KG/M2 | SYSTOLIC BLOOD PRESSURE: 114 MMHG | HEART RATE: 58 BPM | RESPIRATION RATE: 14 BRPM | HEIGHT: 65 IN | DIASTOLIC BLOOD PRESSURE: 68 MMHG | WEIGHT: 155.2 LBS | OXYGEN SATURATION: 98 % | TEMPERATURE: 97.5 F

## 2021-07-22 DIAGNOSIS — M15.9 PRIMARY OSTEOARTHRITIS INVOLVING MULTIPLE JOINTS: ICD-10-CM

## 2021-07-22 DIAGNOSIS — H61.891 IRRITATION OF RIGHT EXTERNAL AUDITORY CANAL: Primary | ICD-10-CM

## 2021-07-22 DIAGNOSIS — Z11.4 SCREENING FOR HIV (HUMAN IMMUNODEFICIENCY VIRUS): ICD-10-CM

## 2021-07-22 DIAGNOSIS — Z11.59 NEED FOR HEPATITIS C SCREENING TEST: ICD-10-CM

## 2021-07-22 DIAGNOSIS — E66.3 OVERWEIGHT: ICD-10-CM

## 2021-07-22 PROCEDURE — 99214 OFFICE O/P EST MOD 30 MIN: CPT | Performed by: FAMILY MEDICINE

## 2021-07-22 RX ORDER — MOMETASONE FUROATE 1 MG/G
1 CREAM TOPICAL 2 TIMES DAILY PRN
Qty: 15 G | Refills: 0 | Status: SHIPPED | OUTPATIENT
Start: 2021-07-22 | End: 2021-08-06

## 2021-07-22 RX ORDER — ORPHENADRINE CITRATE 100 MG/1
100 TABLET, EXTENDED RELEASE ORAL DAILY PRN
Qty: 30 TABLET | Refills: 1 | Status: CANCELLED | OUTPATIENT
Start: 2021-07-22

## 2021-07-22 NOTE — TELEPHONE ENCOUNTER
Medication refill requested: NORFLEX  Last office visit: 7/22/21  Next office visit: None  Last refilled: 7/6/21, 30 X 1  Pharmacy :   [address] [place] [address]  Phone: [phone] Fax: [phone]         Pended: 30 X 1

## 2021-07-22 NOTE — PROGRESS NOTES
Assessment/Plan:  Problem List Items Addressed This Visit     None      Visit Diagnoses     Irritation of right external auditory canal    -  Primary    Relevant Medications    mometasone (ELOCON) 0 1 % cream     May be due to trauma of using stethoscope  Use Elicon cream b i d  p r n  [facility] and warm compresses  Motrin/Tylenol PRN  Screening for HIV (human immunodeficiency virus)        Relevant Orders    HIV 1/2 Antigen/Antibody (4th Generation) w Reflex [facility]    Need for hepatitis C screening test        Relevant Orders    Hepatitis C antibody        Overweight    Recommend lifestyle modifications  Return if symptoms worsen or fail to improve  No future appointments  Subjective:     Maria Alejandra is a 48 y o  female who presents today for a follow-up on her acute medical conditions  HPI:  Chief Complaint   Patient presents with    Earache     R ear pain for 1wk - tender to touch      -- Above per clinical staff and reviewed  --    HPI      Today:      Overweight - Watching diet  +Exercise - Walking 20 minutes, 7 days per week  Right External Ear Canal Pain- Symptoms x 1 week  Hurts when she wears her stethoscope at work  No OTC meds  No otorrhea or decreased hearing  The following portions of the patient's history were reviewed and updated as appropriate: allergies, current medications, past family history, past medical history, past social history, past surgical history and problem list       Review of Systems   Constitutional: Negative for appetite change, chills, diaphoresis, fatigue and fever  HENT: Positive for ear pain  Negative for ear discharge  Respiratory: Negative for chest tightness and shortness of breath  Cardiovascular: Negative for chest pain  Gastrointestinal: Negative for abdominal pain, blood in stool, diarrhea, nausea and vomiting  Genitourinary: Negative for dysuria          Current Outpatient Medications   Medication Sig Dispense Refill    albuterol (Ventolin HFA) 90 mcg/act inhaler Inhale 2 puffs every 4 (four) hours as needed for wheezing 8 5 g 0    ALPRAZolam (XANAX) 0 25 mg tablet TAKE ONE TABLET BY MOUTH EVERY DAYAT BEDTIME AS NEEDED FOR ANXIETY 30 tablet 0    ascorbic acid (VITAMIN C) 500 mg tablet Take 500 mg by mouth daily       Calcium-Vitamin D (CALTRATE 600 PLUS-VIT D PO) Take 600 mg by mouth daily       clindamycin (CLEOCIN T) 1 % external solution apply topically to affected area AS NEEDED FOR FLARES OF THE SCALP  0    estradiol (VAGIFEM, YUVAFEM) 10 MCG TABS vaginal tablet 1 tablet vaginally q hs x 14 days then twice weekly 40 tablet 3    gabapentin (NEURONTIN) 100 mg capsule Take 1 capsule (100 mg total) by mouth 3 (three) times a day 90 capsule 1    omeprazole (PriLOSEC) 20 mg delayed release capsule Take 1 capsule (20 mg total) by mouth 2 (two) times a day 60 capsule 5    orphenadrine (NORFLEX) 100 mg tablet Take 1 tablet (100 mg total) by mouth daily as needed for muscle spasms 30 tablet 1    sertraline (ZOLOFT) 25 mg tablet 1/2 tab daily x 1 week, then 1 tab daily x 1 week, then 1 1/2 tabs daily x 1 week, then 2 tabs daily (Patient taking differently: 1/2 tab daily) 60 tablet 5    Suvorexant (Belsomra) 15 MG TABS Take 1 tablet (15 mg total) by mouth daily at bedtime 30 tablet 0    traMADol (ULTRAM) 50 mg tablet Take 1 tablet (50 mg total) by mouth every 6 (six) hours as needed for severe pain 20 tablet 0    TURMERIC PO Take 1 capsule by mouth daily       valACYclovir (VALTREX) 1,000 mg tablet TAKE 1 TABLET BY MOUTH DAILY AS NEEDED FOR ORAL LESION FOR UP TO 4 DAYS 4 tablet 5    zinc gluconate 50 mg tablet Take 50 mg by mouth daily   mometasone (ELOCON) 0 1 % cream Apply 1 application topically 2 (two) times a day as needed (Ear Irritation) for up to 10 days 15 g 0     No current facility-administered medications for this visit         Objective:  /68   Pulse 58   Temp 97 5 °F (36 4 °C)   Resp 14   Ht 5' 4 5" (1 638 m)   Wt 70 4 kg (155 lb 3 2 oz)   SpO2 98%   BMI 26 23 kg/m²    Wt Readings from Last 3 Encounters:   07/22/21 70 4 kg (155 lb 3 2 oz)   07/02/21 68 kg (150 lb)   07/01/21 69 4 kg (153 lb)      BP Readings from Last 3 Encounters:   07/22/21 114/68   06/15/21 134/80   05/27/21 109/70          Physical Exam  Vitals and nursing note reviewed  Constitutional:       Appearance: Normal appearance  She is well-developed  HENT:      Head: Normocephalic and atraumatic  Right Ear: Tympanic membrane, ear canal and external ear normal  Tenderness (Anterior external canal) present  There is no impacted cerumen  Left Ear: Tympanic membrane, ear canal and external ear normal  There is no impacted cerumen  Nose: Nose normal       Right Sinus: No maxillary sinus tenderness or frontal sinus tenderness  Left Sinus: No maxillary sinus tenderness or frontal sinus tenderness  Mouth/Throat:      Mouth: Mucous membranes are moist       Pharynx: Oropharynx is clear  Uvula midline  Tonsils: No tonsillar exudate  Eyes:      Extraocular Movements: Extraocular movements intact  Conjunctiva/sclera: Conjunctivae normal       Pupils: Pupils are equal, round, and reactive to light  Cardiovascular:      Rate and Rhythm: Normal rate and regular rhythm  Pulses: Normal pulses  Heart sounds: Normal heart sounds  Pulmonary:      Effort: Pulmonary effort is normal       Breath sounds: Normal breath sounds  Abdominal:      Tenderness: There is no guarding  Musculoskeletal:         General: No swelling or tenderness  Cervical back: Neck supple  Right lower leg: No edema  Left lower leg: No edema  Lymphadenopathy:      Cervical: No cervical adenopathy  Skin:     Findings: No rash  Neurological:      General: No focal deficit present  Mental Status: She is alert and oriented to person, place, and time     Psychiatric:         Mood and Affect: Mood normal          Behavior: Behavior normal          Thought Content: Thought content normal          Judgment: Judgment normal          Lab Results:      Lab Results   Component Value Date    WBC 5 59 10/14/2020    HGB 13 0 10/14/2020    HCT 40 0 10/14/2020     10/14/2020    CHOL 249 08/13/2015    TRIG 163 (H) 10/14/2020    HDL 71 10/14/2020    ALT 21 10/14/2020    AST 10 10/14/2020     05/12/2015    K 3 7 10/14/2020     10/14/2020    CREATININE 0 92 10/14/2020    BUN 18 10/14/2020    CO2 32 10/14/2020    GLUF 83 10/14/2020    HGBA1C 5 5 08/08/2018     No results found for: URICACID  Invalid input(s): BASENAME Vitamin D    Mammo screening bilateral w 3d & cad    Result Date: 7/2/2021  Narrative: DIAGNOSIS: Screening mammogram for high-risk patient TECHNIQUE: Digital screening mammography was performed  Computer Aided Detection (CAD) analyzed all applicable images  COMPARISONS:  Multiple prior exams dating between 10/18/2007 and 02/05/2020  RELEVANT HISTORY: Family Breast Cancer History: History of breast cancer in Maternal Aunt, Other, Other, Maternal Uncle  Family Medical History: Family medical history includes breast cancer in 4 relatives (maternal aunt, maternal uncle, other, other) and colon cancer in paternal grandmother  Personal History: Hormone history includes birth control  No known relevant surgical history  No known relevant medical history  The patient is scheduled in a reminder system for screening mammography  8-10% of cancers will be missed on mammography  Management of a palpable abnormality must be based on clinical grounds  Patients will be notified of their results via letter from our facility  Accredited by [employer] of [employer] and FDA  RISK ASSESSMENT: 5 Year Tyrer-Cuzick: 1 4 % 10 Year Tyrer-Cuzick: 3 07 % Lifetime Tyrer-Cuzick: 10 63 % TISSUE DENSITY: There are scattered areas of fibroglandular density  INDICATION: Radha Carmichael is a 48 y o  female presenting for screening mammography  FINDINGS: Bilateral There are no suspicious masses, grouped microcalcifications or areas of architectural distortion  The skin and nipple areolar complex are unremarkable  Few diffusely distributed calcifications are noted in each breast      Impression: No mammographic evidence of malignancy  ASSESSMENT/BI-RADS CATEGORY: Left: 2 - Benign Right: 2 - Benign Overall: 2 - Benign RECOMMENDATION:      - Routine screening mammogram in 1 year for both breasts  Workstation ID: [id number]        POCT Labs      BMI Counseling: Body mass index is 26 23 kg/m²  The BMI is above normal  Nutrition recommendations include encouraging healthy choices of fruits and vegetables  Exercise recommendations include exercising 3-5 times per week  No pharmacotherapy was ordered

## 2021-08-06 ENCOUNTER — OFFICE VISIT (OUTPATIENT)
Dept: FAMILY MEDICINE CLINIC | Facility: CLINIC | Age: 56
End: 2021-08-06
Payer: COMMERCIAL

## 2021-08-06 VITALS
BODY MASS INDEX: 25.89 KG/M2 | TEMPERATURE: 97.6 F | HEART RATE: 52 BPM | HEIGHT: 65 IN | SYSTOLIC BLOOD PRESSURE: 112 MMHG | RESPIRATION RATE: 16 BRPM | WEIGHT: 155.4 LBS | OXYGEN SATURATION: 98 % | DIASTOLIC BLOOD PRESSURE: 58 MMHG

## 2021-08-06 DIAGNOSIS — M54.2 NECK PAIN: ICD-10-CM

## 2021-08-06 DIAGNOSIS — Z29.9 PREVENTIVE MEASURE: ICD-10-CM

## 2021-08-06 DIAGNOSIS — R23.2 FLUSHING: Primary | ICD-10-CM

## 2021-08-06 DIAGNOSIS — Z86.16 HISTORY OF COVID-19: ICD-10-CM

## 2021-08-06 PROCEDURE — 99214 OFFICE O/P EST MOD 30 MIN: CPT | Performed by: FAMILY MEDICINE

## 2021-08-06 RX ORDER — NAPROXEN 500 MG/1
500 TABLET ORAL 2 TIMES DAILY WITH MEALS
Qty: 60 TABLET | Refills: 1 | Status: SHIPPED | OUTPATIENT
Start: 2021-08-06

## 2021-08-06 NOTE — PROGRESS NOTES
Assessment/Plan:    No problem-specific Assessment & Plan notes found for this encounter  Diagnoses and all orders for this visit:    Flushing  Comments:  labs as ordered  Orders:  -     TSH, 3rd generation; Future  -     Comprehensive metabolic panel; Future  -     CBC and differential; Future    Neck pain  Comments:  may use naproxen at night and see if we can calm the flaring symptoms  okay to use with belsomra  Orders:  -     naproxen (NAPROSYN) 500 mg tablet; Take 1 tablet (500 mg total) by mouth 2 (two) times a day with meals    History of COVID-19  Comments:  discussed pros and cons of ab testing    Orders:  -     SARS-CoV2 Antibody, Total (IgG, IgA, IgM) [facility]- Lab Collect; Future    Preventive measure  -     Hemoglobin A1C; Future        Subjective:      Patient ID: Lauren Harvey is a 48 y o  female  HPI  Pt presents c/o daytime episodes of sweating/flushing over the last few months  No new meds  States this feels different than her usual menopausal hot flashes  Not associated with activity  No chest pain, palps  Pt having trouble because she has insomnia and ongoing back and neck pain  Known cervical radiculopathy  She needs belsomra to sleep, but she often wakes with pain  Doesn't want to mix ultram with belsomra, but doesn't sleep if she doesn't take belsomra     Wonders about getting covid ab test   Had covid in the spring  The following portions of the patient's history were reviewed and updated as appropriate: allergies, current medications, past family history, past medical history, past social history, past surgical history and problem list     Review of Systems   Constitutional: Negative for chills, fatigue, fever and unexpected weight change  HENT: Negative for congestion, ear pain, hearing loss, postnasal drip, rhinorrhea, sinus pressure, sinus pain, sore throat, trouble swallowing and voice change  Eyes: Negative for pain, redness and visual disturbance     Respiratory: Negative for cough and shortness of breath  Cardiovascular: Negative for chest pain, palpitations and leg swelling  Gastrointestinal: Negative for abdominal pain, constipation, diarrhea and nausea  Endocrine: Positive for heat intolerance  Negative for cold intolerance, polydipsia and polyuria  Genitourinary: Negative for dysuria, frequency and urgency  Musculoskeletal: Positive for back pain and neck pain  Negative for arthralgias, joint swelling and myalgias  Skin: Negative for rash  No suspicious lesions   Neurological: Negative for weakness, numbness and headaches  Hematological: Negative for adenopathy  Objective:      /58   Pulse (!) 52   Temp 97 6 °F (36 4 °C)   Resp 16   Ht 5' 4 5" (1 638 m)   Wt 70 5 kg (155 lb 6 4 oz)   SpO2 98%   BMI 26 26 kg/m²          Physical Exam  Constitutional:       General: She is not in acute distress  Appearance: She is well-developed  HENT:      Head: Normocephalic and atraumatic  Right Ear: Tympanic membrane, ear canal and external ear normal       Left Ear: Tympanic membrane, ear canal and external ear normal       Nose: Nose normal       Mouth/Throat:      Pharynx: No oropharyngeal exudate  Eyes:      Conjunctiva/sclera: Conjunctivae normal       Pupils: Pupils are equal, round, and reactive to light  Neck:      Thyroid: No thyromegaly  Vascular: No carotid bruit or JVD  Cardiovascular:      Rate and Rhythm: Regular rhythm  Heart sounds: S1 normal and S2 normal  No murmur heard  No friction rub  No gallop  No S3 or S4 sounds  Pulmonary:      Effort: Pulmonary effort is normal       Breath sounds: Normal breath sounds  No wheezing, rhonchi or rales  Abdominal:      General: Bowel sounds are normal  There is no distension  Palpations: Abdomen is soft  Tenderness: There is no abdominal tenderness  Lymphadenopathy:      Cervical: No cervical adenopathy     Neurological:      Mental Status: She is alert and oriented to person, place, and time  Cranial Nerves: No cranial nerve deficit  Sensory: No sensory deficit  Deep Tendon Reflexes: Reflexes are normal and symmetric

## 2021-08-10 ENCOUNTER — LAB (OUTPATIENT)
Dept: LAB | Facility: CLINIC | Age: 56
End: 2021-08-10
Payer: COMMERCIAL

## 2021-08-10 ENCOUNTER — APPOINTMENT (OUTPATIENT)
Dept: LAB | Facility: CLINIC | Age: 56
End: 2021-08-10

## 2021-08-10 DIAGNOSIS — Z29.9 PREVENTIVE MEASURE: ICD-10-CM

## 2021-08-10 DIAGNOSIS — Z11.4 SCREENING FOR HIV (HUMAN IMMUNODEFICIENCY VIRUS): ICD-10-CM

## 2021-08-10 DIAGNOSIS — R23.2 FLUSHING: ICD-10-CM

## 2021-08-10 DIAGNOSIS — Z86.16 HISTORY OF COVID-19: ICD-10-CM

## 2021-08-10 DIAGNOSIS — Z11.59 NEED FOR HEPATITIS C SCREENING TEST: ICD-10-CM

## 2021-08-10 DIAGNOSIS — Z00.8 HEALTH EXAMINATION IN POPULATION SURVEY: ICD-10-CM

## 2021-08-10 LAB
ALBUMIN SERPL BCP-MCNC: 4.2 G/DL (ref 3.5–5)
ALP SERPL-CCNC: 81 U/L (ref 46–116)
ALT SERPL W P-5'-P-CCNC: 23 U/L (ref 12–78)
ANION GAP SERPL CALCULATED.3IONS-SCNC: 10 MMOL/L (ref 4–13)
AST SERPL W P-5'-P-CCNC: 18 U/L (ref 5–45)
BASOPHILS # BLD AUTO: 0.05 THOUSANDS/ΜL (ref 0–0.1)
BASOPHILS NFR BLD AUTO: 1 % (ref 0–1)
BILIRUB SERPL-MCNC: 0.38 MG/DL (ref 0.2–1)
BUN SERPL-MCNC: 21 MG/DL (ref 5–25)
CALCIUM SERPL-MCNC: 8.6 MG/DL (ref 8.3–10.1)
CHLORIDE SERPL-SCNC: 104 MMOL/L (ref 100–108)
CHOLEST SERPL-MCNC: 264 MG/DL (ref 50–200)
CO2 SERPL-SCNC: 25 MMOL/L (ref 21–32)
CREAT SERPL-MCNC: 0.81 MG/DL (ref 0.6–1.3)
EOSINOPHIL # BLD AUTO: 0.14 THOUSAND/ΜL (ref 0–0.61)
EOSINOPHIL NFR BLD AUTO: 3 % (ref 0–6)
ERYTHROCYTE [DISTWIDTH] IN BLOOD BY AUTOMATED COUNT: 12.2 % (ref 11.6–15.1)
GFR SERPL CREATININE-BSD FRML MDRD: 83 ML/MIN/1.73SQ M
GLUCOSE P FAST SERPL-MCNC: 88 MG/DL (ref 65–99)
HCT VFR BLD AUTO: 39.6 % (ref 34.8–46.1)
HCV AB SER QL: NORMAL
HDLC SERPL-MCNC: 61 MG/DL
HGB BLD-MCNC: 13 G/DL (ref 11.5–15.4)
IMM GRANULOCYTES # BLD AUTO: 0.02 THOUSAND/UL (ref 0–0.2)
IMM GRANULOCYTES NFR BLD AUTO: 0 % (ref 0–2)
LDLC SERPL CALC-MCNC: 173 MG/DL (ref 0–100)
LYMPHOCYTES # BLD AUTO: 1.25 THOUSANDS/ΜL (ref 0.6–4.47)
LYMPHOCYTES NFR BLD AUTO: 24 % (ref 14–44)
MCH RBC QN AUTO: 29.7 PG (ref 26.8–34.3)
MCHC RBC AUTO-ENTMCNC: 32.8 G/DL (ref 31.4–37.4)
MCV RBC AUTO: 91 FL (ref 82–98)
MONOCYTES # BLD AUTO: 0.5 THOUSAND/ΜL (ref 0.17–1.22)
MONOCYTES NFR BLD AUTO: 10 % (ref 4–12)
NEUTROPHILS # BLD AUTO: 3.21 THOUSANDS/ΜL (ref 1.85–7.62)
NEUTS SEG NFR BLD AUTO: 62 % (ref 43–75)
NONHDLC SERPL-MCNC: 203 MG/DL
NRBC BLD AUTO-RTO: 0 /100 WBCS
PLATELET # BLD AUTO: 212 THOUSANDS/UL (ref 149–390)
PMV BLD AUTO: 10.4 FL (ref 8.9–12.7)
POTASSIUM SERPL-SCNC: 4.4 MMOL/L (ref 3.5–5.3)
PROT SERPL-MCNC: 7.4 G/DL (ref 6.4–8.2)
RBC # BLD AUTO: 4.37 MILLION/UL (ref 3.81–5.12)
SARS-COV-2 IGG SERPL QL IA: REACTIVE
SARS-COV-2 IGG+IGM SERPL QL IA: REACTIVE
SODIUM SERPL-SCNC: 139 MMOL/L (ref 136–145)
TRIGL SERPL-MCNC: 152 MG/DL
TSH SERPL DL<=0.05 MIU/L-ACNC: 3.33 UIU/ML (ref 0.36–3.74)
WBC # BLD AUTO: 5.17 THOUSAND/UL (ref 4.31–10.16)

## 2021-08-10 PROCEDURE — 80061 LIPID PANEL: CPT

## 2021-08-10 PROCEDURE — 84443 ASSAY THYROID STIM HORMONE: CPT

## 2021-08-10 PROCEDURE — 86769 SARS-COV-2 COVID-19 ANTIBODY: CPT

## 2021-08-10 PROCEDURE — 85025 COMPLETE CBC W/AUTO DIFF WBC: CPT

## 2021-08-10 PROCEDURE — 80053 COMPREHEN METABOLIC PANEL: CPT

## 2021-08-10 PROCEDURE — 87389 HIV-1 AG W/HIV-1&-2 AB AG IA: CPT

## 2021-08-10 PROCEDURE — 86803 HEPATITIS C AB TEST: CPT

## 2021-08-11 LAB — HIV 1+2 AB+HIV1 P24 AG SERPL QL IA: NORMAL

## 2021-08-31 DIAGNOSIS — G47.00 INSOMNIA, UNSPECIFIED TYPE: ICD-10-CM

## 2021-08-31 RX ORDER — SUVOREXANT 15 MG/1
1 TABLET, FILM COATED ORAL
Qty: 30 TABLET | Refills: 0 | Status: SHIPPED | OUTPATIENT
Start: 2021-08-31 | End: 2021-10-12 | Stop reason: SDUPTHER

## 2021-08-31 NOTE — TELEPHONE ENCOUNTER
Medication refill requested: Belsomra  Last office visit: 8/6/21  Next office visit: None  Last refilled: 7/13/21  Pharmacy :   [id number], [address] [place] [address]  Phone: [phone] Fax: [phone]         Pended: 30 x 0

## 2021-09-07 NOTE — MISCELLANEOUS
Message  Patient's cholesterol for screening purposes at work was significantly elevated  At this point, I would recommend that she start treatment for hyperlipidemia, more specifically statins  Recommend pravastatin 40 milligrams daily secondary to significant myalgias that she has  I would also recommend that she have a CMP prior to starting any statins secondary to potential for liver issues  Plan  Hyperlipidemia    · (1) COMPREHENSIVE METABOLIC PANEL; Status:Active; Requested for:[id number];     Signatures   Electronically signed by :  LATISHA Benjamin ; Aug  7 2017  4:27PM EST                       (Author) Pt's wife informed and appt scheduled for 2.8.2022.

## 2021-10-12 DIAGNOSIS — G47.00 INSOMNIA, UNSPECIFIED TYPE: ICD-10-CM

## 2021-10-12 RX ORDER — SUVOREXANT 15 MG/1
1 TABLET, FILM COATED ORAL
Qty: 30 TABLET | Refills: 0 | Status: SHIPPED | OUTPATIENT
Start: 2021-10-12 | End: 2021-12-03 | Stop reason: SDUPTHER

## 2021-11-01 DIAGNOSIS — F41.9 ANXIETY: Chronic | ICD-10-CM

## 2021-11-01 RX ORDER — ALPRAZOLAM 0.25 MG/1
0.25 TABLET ORAL
Qty: 30 TABLET | Refills: 0 | Status: SHIPPED | OUTPATIENT
Start: 2021-11-01 | End: 2022-06-21 | Stop reason: SDUPTHER

## 2021-12-03 DIAGNOSIS — G47.00 INSOMNIA, UNSPECIFIED TYPE: ICD-10-CM

## 2021-12-03 RX ORDER — SUVOREXANT 15 MG/1
1 TABLET, FILM COATED ORAL
Qty: 30 TABLET | Refills: 2 | Status: SHIPPED | OUTPATIENT
Start: 2021-12-03 | End: 2022-01-24 | Stop reason: SDUPTHER

## 2022-01-17 ENCOUNTER — TELEPHONE (OUTPATIENT)
Dept: FAMILY MEDICINE CLINIC | Facility: CLINIC | Age: 57
End: 2022-01-17

## 2022-01-17 ENCOUNTER — CLINICAL SUPPORT (OUTPATIENT)
Dept: FAMILY MEDICINE CLINIC | Facility: CLINIC | Age: 57
End: 2022-01-17
Payer: COMMERCIAL

## 2022-01-17 ENCOUNTER — TELEMEDICINE (OUTPATIENT)
Dept: FAMILY MEDICINE CLINIC | Facility: CLINIC | Age: 57
End: 2022-01-17
Payer: COMMERCIAL

## 2022-01-17 VITALS — WEIGHT: 150 LBS | BODY MASS INDEX: 24.99 KG/M2 | TEMPERATURE: 98.4 F | HEIGHT: 65 IN

## 2022-01-17 DIAGNOSIS — Z20.828 SARS-ASSOCIATED CORONAVIRUS EXPOSURE: ICD-10-CM

## 2022-01-17 DIAGNOSIS — B34.9 VIRAL INFECTION, UNSPECIFIED: Primary | ICD-10-CM

## 2022-01-17 DIAGNOSIS — J02.9 SORE THROAT: Primary | ICD-10-CM

## 2022-01-17 DIAGNOSIS — G47.33 OSA (OBSTRUCTIVE SLEEP APNEA): ICD-10-CM

## 2022-01-17 DIAGNOSIS — R43.2 LOSS OF TASTE: ICD-10-CM

## 2022-01-17 LAB
SARS-COV-2 AG UPPER RESP QL IA: NEGATIVE
VALID CONTROL: NORMAL

## 2022-01-17 PROCEDURE — 99214 OFFICE O/P EST MOD 30 MIN: CPT | Performed by: FAMILY MEDICINE

## 2022-01-17 PROCEDURE — 87811 SARS-COV-2 COVID19 W/OPTIC: CPT

## 2022-01-17 NOTE — TELEPHONE ENCOUNTER
----- Message from nOesimo Padron DO sent at 1/17/2022 11:40 AM EST -----  Needs Employee COVID test in office

## 2022-01-17 NOTE — PROGRESS NOTES
COVID-19 Outpatient Progress Note    Assessment/Plan:    Problem List Items Addressed This Visit        Respiratory    BRENDA (obstructive sleep apnea)      Other Visit Diagnoses     Viral infection, unspecified    -  Primary         Disposition:     Referred patient to centralized site to test for COVID-19  Risks and benefits of COVID-19 vaccination was discussed with patient  Patient is a [employer] employee and will come to the office for a rapid nurse visit appointment  If her COVID-19 test is positive, she should complete 5 days of isolation through Friday, 01/21/2022, at the earliest ending on Saturday, 01/22/2022 if she needs fever criteria  She would need a mask for another 5 days  Patient should quarantine while awaiting daughter's COVID results  If her COVID-19 test is negative and her daughters COVID-19 test is negative, she can discontinue quarantine  If patient's COVID-19 test is negative but patient is daughters COVID-19 test is positive, as she is unvaccinated and healthcare provider, she will need to quarantine for 7 days after her daughter completes isolation, then mask for 7 days thereafter  She would need to test on day 2 (Wednesday, 1/19/22)  And day 5-7 of exposure (Friday, 1/22/22 - Sunday, 1/24/22)  I have spent 15 minutes directly with the patient  Greater than 50% of this time was spent in counseling/coordination of care regarding: diagnostic results, prognosis, risks and benefits of treatment options, instructions for management, patient and family education, importance of treatment compliance, risk factor reductions and impressions  Encounter provider Familia Dee DO    Provider located at [address] [place] [id number] [phone]    Recent Visits  No visits were found meeting these conditions    Showing recent visits within past 7 days and meeting all other requirements  Today's Visits  Date Type Provider Dept   01/17/22 Telemedicine DO Jackson Nava   Showing today's visits and meeting all other requirements  Future Appointments  No visits were found meeting these conditions  Showing future appointments within next 150 days and meeting all other requirements     This virtual check-in was done via D[employer] used as AM Well not working and patient was informed that this is not a secure, HIPAA-compliant platform  She agrees to proceed  Patient agrees to participate in a virtual check in via telephone or video visit instead of presenting to the office to address urgent/immediate medical needs  Patient is aware this is a billable service  After connecting through [facility], the patient was identified by name and date of birth  Owen Najera was informed that this was a telemedicine visit and that the exam was being conducted confidentially over secure lines  My office door was closed  No one else was in the room  Owen Najera acknowledged consent and understanding of privacy and security of the telemedicine visit  I informed the patient that I have reviewed her record in Epic and presented the opportunity for her to ask any questions regarding the visit today  The patient agreed to participate  Verification of patient location:  Patient is located in the following state in which I hold an active license: [place]    Subjective:   Owen Najrea is a 55 y o  female who is concerned about COVID-19  Patient's symptoms include fatigue, sore throat, anosmia (Chronic), loss of taste (Chronic) and headache  Patient denies fever, chills, congestion, rhinorrhea, cough, shortness of breath, chest tightness, abdominal pain, nausea, vomiting, diarrhea and myalgias       - Date of symptom onset: 1/16/2022      COVID-19 vaccination status: Not vaccinated    Exposure:   Contact with a person who is under investigation (PUI) for or who is positive for COVID-19 within the last 14 days?: Yes    Hospitalized recently for fever and/or lower respiratory symptoms?: No      Currently a healthcare worker that is involved in direct patient care?: Yes      Works in a special setting where the risk of COVID-19 transmission may be high? (this may include long-term care, correctional and correction facilities; homeless shelters; assisted-living facilities and group homes ): No      Resident in a special setting where the risk of COVID-19 transmission may be high? (this may include long-term care, correctional and correction facilities; homeless shelters; assisted-living facilities and group homes ): No      Daughter is COVID tested - pending 1/17/22, symptomatic Saturday, 1/15/22, last contact Monday, 1/17/22     is asymptomatic        Lab Results   Component Value Date    SARSCOV2 Negative 09/28/2021     Past Medical History:   Diagnosis Date    Acid reflux     Allergic reaction to dye     Resolved - [id number]    Aneurysm of thoracic aorta (HCC)     last assess 2017    Anxiety     Arthritis     Atrophic vaginitis     Benign familial tremor     Last assessed - [id number]    Cervical back pain with evidence of disc disease     Chronic otitis media of right ear     Last assessed - [id number]    Contact dermatitis     Last assessed - [id number]    Dermatitis     Disturbance of smell     Last assessed - 36Ydd3750    DJD (degenerative joint disease)     Endometriosis     Last assessed - [id number]    Esophageal reflux     Last assessed - 37Isz7320    Extremity pain     RUE    Folliculitis     Last assessed - 45Qqj7236    Headache     Herpes simplex     High cholesterol     Hyperlipidemia     Last assessed - 61PQK7748    Lateral epicondylitis of left elbow     Last assessed - [id number]    Lumbar back pain     Myofascial pain syndrome     Neoplasm of skin     Last assessed - 15Hhm8927    Osteoarthritis     PONV (postoperative nausea and vomiting)     severe    Postmenopausal atrophic vaginitis     Last assessed - [id number]    Prophylactic antibiotic     Last assessed - 14FEM4101    Rotator cuff disorder     Sleep apnea     mild no cpap    Taste impairment     taste disturbances - Last assessed - 16Khe4605    Tremor, hereditary, benign     Wound, open, finger     Last assessed - [id number]     Past Surgical History:   Procedure Laterality Date    COLONOSCOPY      DIAGNOSTIC LAPAROSCOPY      LAPAROSCOPIC ENDOMETRIOSIS FULGURATION      Laparoscop excis endometriotic tissue uterosacral ligaments    [place] COLONOSCOPY FLX DX W/COLLJ SPEC WHEN PFRMD N/A 12/6/2018    Procedure: COLONOSCOPY;  Surgeon: Markos Olsen DO;  Location: [facility]; Service: Gastroenterology    [place] ESOPHAGOGASTRODUODENOSCOPY TRANSORAL DIAGNOSTIC N/A 12/6/2018    Procedure: ESOPHAGOGASTRODUODENOSCOPY (EGD); Surgeon: Markos Olsen DO;  Location: [facility];   Service: Gastroenterology    [place] TOTAL HIP ARTHROPLASTY Left 5/16/2016    Procedure: ANTERIOR TOTAL HIP ARTHROPLASTY ;  Surgeon: Vanesa Sexton MD;  [facility]: [facility];  Service: Orthopedics    REVISION TOTAL HIP ARTHROPLASTY  06/15/2011    TOTAL HIP ARTHROPLASTY Right     TOTAL HIP ARTHROPLASTY  10/20/2010    UPPER GASTROINTESTINAL ENDOSCOPY       Current Outpatient Medications   Medication Sig Dispense Refill    albuterol (Ventolin HFA) 90 mcg/act inhaler Inhale 2 puffs every 4 (four) hours as needed for wheezing 8 5 g 0    ALPRAZolam (XANAX) 0 25 mg tablet Take 1 tablet (0 25 mg total) by mouth daily at bedtime as needed for anxiety 30 tablet 0    ascorbic acid (VITAMIN C) 500 mg tablet Take 500 mg by mouth daily       Calcium-Vitamin D (CALTRATE 600 PLUS-VIT D PO) Take 600 mg by mouth daily       clindamycin (CLEOCIN T) 1 % external solution apply topically to affected area AS NEEDED FOR FLARES OF THE SCALP  0    estradiol (VAGIFEM, YUVAFEM) 10 MCG TABS vaginal tablet 1 tablet vaginally q hs x 14 days then twice weekly 40 tablet 3    naproxen (NAPROSYN) 500 mg tablet Take 1 tablet (500 mg total) by mouth 2 (two) times a day with meals (Patient taking differently: Take 500 mg by mouth as needed  ) 60 tablet 1    omeprazole (PriLOSEC) 20 mg delayed release capsule Take 1 capsule (20 mg total) by mouth 2 (two) times a day (Patient taking differently: Take 20 mg by mouth as needed  ) 60 capsule 5    orphenadrine (NORFLEX) 100 mg tablet Take 1 tablet (100 mg total) by mouth daily as needed for muscle spasms 30 tablet 1    Suvorexant (Belsomra) 15 MG TABS Take 1 tablet (15 mg total) by mouth daily at bedtime 30 tablet 2    traMADol (ULTRAM) 50 mg tablet Take 1 tablet (50 mg total) by mouth every 6 (six) hours as needed for severe pain 20 tablet 0    TURMERIC PO Take 1 capsule by mouth daily       valACYclovir (VALTREX) 1,000 mg tablet TAKE 1 TABLET BY MOUTH DAILY AS NEEDED FOR ORAL LESION FOR UP TO 4 DAYS 4 tablet 5    zinc gluconate 50 mg tablet Take 50 mg by mouth daily   gabapentin (NEURONTIN) 100 mg capsule Take 1 capsule (100 mg total) by mouth 3 (three) times a day (Patient not taking: Reported on 1/17/2022 ) 90 capsule 1     No current facility-administered medications for this visit  Allergies   Allergen Reactions    Iodinated Diagnostic Agents Swelling and Eye Swelling     IVP dye specifically; swollen eye lid shut    Morphine Vomiting     Reaction Date: [id number];     Morphine And Related      DENIES ALLERGY RELATES N/V TO ANESTHESIA    Vicodin [Hydrocodone-Acetaminophen]      No allergy to tylenol   Does have N/V to vicoden    Effexor [Venlafaxine] Arthralgia    Gentamicin Rash     Reaction Date: [id number];        Review of Systems   Constitutional: Positive for fatigue  Negative for chills and fever  HENT: Positive for sore throat  Negative for congestion and rhinorrhea  Respiratory: Negative for cough, chest tightness and shortness of breath      Gastrointestinal: Negative for abdominal pain, diarrhea, nausea and vomiting  Musculoskeletal: Negative for myalgias  Neurological: Positive for headaches  Objective:    Vitals:    01/17/22 1036   Temp: 98 4 °F (36 9 °C)   Weight: 68 kg (150 lb)   Height: 5' 4 5" (1 638 m)       Physical Exam  Vitals and nursing note reviewed  Constitutional:       General: She is not in acute distress  Appearance: Normal appearance  She is well-developed  HENT:      Head: Normocephalic and atraumatic  Right Ear: External ear normal       Left Ear: External ear normal       Nose: Nose normal       Mouth/Throat:      Mouth: Mucous membranes are moist       Pharynx: Oropharynx is clear  No oropharyngeal exudate or posterior oropharyngeal erythema  Eyes:      Extraocular Movements: Extraocular movements intact  Conjunctiva/sclera: Conjunctivae normal    Pulmonary:      Effort: Pulmonary effort is normal  No respiratory distress  Musculoskeletal:         General: No swelling  Cervical back: Normal range of motion  Right lower leg: No edema  Left lower leg: No edema  Skin:     General: Skin is warm and dry  Neurological:      General: No focal deficit present  Mental Status: She is alert and oriented to person, place, and time  Psychiatric:         Mood and Affect: Mood normal          VIRTUAL VISIT DISCLAIMER    Maria Alejandra Silva verbally agrees to participate in [employer]  Pt is aware that [employer] could be limited without vital signs or the ability to perform a full hands-on physical exam  Maria Alejandra Arreguin understands she or the provider may request at any time to terminate the video visit and request the patient to seek care or treatment in person

## 2022-01-17 NOTE — PATIENT INSTRUCTIONS

## 2022-01-21 ENCOUNTER — OFFICE VISIT (OUTPATIENT)
Dept: FAMILY MEDICINE CLINIC | Facility: CLINIC | Age: 57
End: 2022-01-21
Payer: COMMERCIAL

## 2022-01-21 VITALS
HEIGHT: 65 IN | TEMPERATURE: 97.5 F | RESPIRATION RATE: 16 BRPM | BODY MASS INDEX: 25.26 KG/M2 | HEART RATE: 62 BPM | DIASTOLIC BLOOD PRESSURE: 60 MMHG | WEIGHT: 151.6 LBS | SYSTOLIC BLOOD PRESSURE: 96 MMHG | OXYGEN SATURATION: 99 %

## 2022-01-21 DIAGNOSIS — E78.2 MIXED HYPERLIPIDEMIA: Primary | ICD-10-CM

## 2022-01-21 DIAGNOSIS — F51.01 PRIMARY INSOMNIA: ICD-10-CM

## 2022-01-21 DIAGNOSIS — G47.33 OSA (OBSTRUCTIVE SLEEP APNEA): ICD-10-CM

## 2022-01-21 DIAGNOSIS — M54.12 CERVICAL RADICULOPATHY: ICD-10-CM

## 2022-01-21 DIAGNOSIS — F41.9 ANXIETY: Chronic | ICD-10-CM

## 2022-01-21 PROBLEM — F11.20 CONTINUOUS OPIOID DEPENDENCE (HCC): Status: RESOLVED | Noted: 2022-01-21 | Resolved: 2022-01-21

## 2022-01-21 PROBLEM — F11.20 CONTINUOUS OPIOID DEPENDENCE (HCC): Status: ACTIVE | Noted: 2022-01-21

## 2022-01-21 PROCEDURE — 99214 OFFICE O/P EST MOD 30 MIN: CPT | Performed by: FAMILY MEDICINE

## 2022-01-21 NOTE — PROGRESS NOTES
Assessment/Plan:    No problem-specific Assessment & Plan notes found for this encounter  Diagnoses and all orders for this visit:    Mixed hyperlipidemia  Comments:  working on diet  recheck in april/early may  Orders:  -     Lipid panel; Future  -     Comprehensive metabolic panel; Future    Primary insomnia  Comments:  continue belsomra    Cervical radiculopathy  Comments:  continue prn ultram/muscle relaxants; these are rarely used  continue HEP    BRENDA (obstructive sleep apnea)  Comments:  cpap    Anxiety  Comments:  doing well without daily med  uses prn xanax very sparingly        Subjective:      Patient ID: Robert Selby is a 55 y o  female  HPI  Takes belsomra and does well  No sleep hangover  Tolerating meds  Sometimes skips on the weekend  Doing neck and back stretches for cervical/thoracic pain  Occasional use of ultram or muscle relaxants, but this is rare  Anxiety is controlled  Very rare use of xanax which she does not use with ultram     Using cpap  Hasn't needed omeprazole  No abd pain  Due for shingrix  She will consider and call if she wants it  The following portions of the patient's history were reviewed and updated as appropriate: allergies, current medications, past family history, past medical history, past social history, past surgical history and problem list     Review of Systems   Constitutional: Negative for chills, fatigue, fever and unexpected weight change  HENT: Negative for congestion, ear pain, hearing loss, postnasal drip, rhinorrhea, sinus pressure, sinus pain, sore throat, trouble swallowing and voice change  Eyes: Negative for pain, redness and visual disturbance  Respiratory: Negative for cough and shortness of breath  Cardiovascular: Negative for chest pain, palpitations and leg swelling  Gastrointestinal: Negative for abdominal pain, constipation, diarrhea and nausea     Endocrine: Negative for cold intolerance, heat intolerance, polydipsia and polyuria  Genitourinary: Negative for dysuria, frequency and urgency  Musculoskeletal: Negative for arthralgias, joint swelling and myalgias  Skin: Negative for rash  No suspicious lesions   Neurological: Negative for weakness, numbness and headaches  Hematological: Negative for adenopathy  Objective:      BP 96/60   Pulse 62   Temp 97 5 °F (36 4 °C)   Resp 16   Ht 5' 4 5" (1 638 m)   Wt 68 8 kg (151 lb 9 6 oz)   SpO2 99%   BMI 25 62 kg/m²          Physical Exam  Constitutional:       General: She is not in acute distress  Appearance: She is well-developed  HENT:      Head: Normocephalic and atraumatic  Right Ear: Tympanic membrane, ear canal and external ear normal       Left Ear: Tympanic membrane, ear canal and external ear normal       Nose: Nose normal       Mouth/Throat:      Pharynx: No oropharyngeal exudate  Eyes:      Conjunctiva/sclera: Conjunctivae normal       Pupils: Pupils are equal, round, and reactive to light  Neck:      Thyroid: No thyromegaly  Vascular: No carotid bruit or JVD  Cardiovascular:      Rate and Rhythm: Regular rhythm  Heart sounds: S1 normal and S2 normal  No murmur heard  No friction rub  No gallop  No S3 or S4 sounds  Pulmonary:      Effort: Pulmonary effort is normal       Breath sounds: Normal breath sounds  No wheezing, rhonchi or rales  Abdominal:      General: Bowel sounds are normal  There is no distension  Palpations: Abdomen is soft  Tenderness: There is no abdominal tenderness  Lymphadenopathy:      Cervical: No cervical adenopathy  Neurological:      Mental Status: She is alert and oriented to person, place, and time  Cranial Nerves: No cranial nerve deficit  Sensory: No sensory deficit  Deep Tendon Reflexes: Reflexes are normal and symmetric

## 2022-01-24 DIAGNOSIS — G47.00 INSOMNIA, UNSPECIFIED TYPE: ICD-10-CM

## 2022-01-25 RX ORDER — SUVOREXANT 15 MG/1
1 TABLET, FILM COATED ORAL
Qty: 30 TABLET | Refills: 0 | Status: SHIPPED | OUTPATIENT
Start: 2022-01-25 | End: 2022-03-10 | Stop reason: SDUPTHER

## 2022-01-25 NOTE — TELEPHONE ENCOUNTER
Medication refill requested: belsomra   Last office visit: 1/21/22  Next office visit: 1/27/23  Last refilled: 12/03/21  Pharmacy:     [id number] [address]  Phone: [phone] Fax: [phone]    Pended: 30 x 2

## 2022-03-10 DIAGNOSIS — G47.00 INSOMNIA, UNSPECIFIED TYPE: ICD-10-CM

## 2022-03-10 NOTE — TELEPHONE ENCOUNTER
Medication refill requested: Suvorexant (Belsomra) 15 MG TABS  Last office visit: 01/21/2022  Next office visit:01/27/2023  Last refilled:01/25/2022  Labs: No  Ordering Provider: Alba Mishra ([facility] pharmacy send RX to):       [address] [address]  Phone: [phone] Fax: [phone]

## 2022-03-11 RX ORDER — SUVOREXANT 15 MG/1
1 TABLET, FILM COATED ORAL
Qty: 30 TABLET | Refills: 0 | Status: SHIPPED | OUTPATIENT
Start: 2022-03-11 | End: 2022-05-25 | Stop reason: SDUPTHER

## 2022-03-14 ENCOUNTER — VBI (OUTPATIENT)
Dept: ADMINISTRATIVE | Facility: OTHER | Age: 57
End: 2022-03-14

## 2022-03-16 ENCOUNTER — VBI (OUTPATIENT)
Dept: ADMINISTRATIVE | Facility: OTHER | Age: 57
End: 2022-03-16

## 2022-03-28 ENCOUNTER — PATIENT MESSAGE (OUTPATIENT)
Dept: FAMILY MEDICINE CLINIC | Facility: CLINIC | Age: 57
End: 2022-03-28

## 2022-03-28 DIAGNOSIS — B00.1 HERPES LABIALIS: ICD-10-CM

## 2022-03-28 NOTE — TELEPHONE ENCOUNTER
Medication refill requested: valACYclovir (VALTREX) 1,000 mg tablet   Last office visit: 1/21/2022  Next office visit: 01/27/2023  Last refilled: 01/10/2021  Labs: No  Ordering Provider: Historical       Pharmacy ([facility] send RX to):       [address] [place] [id number]  Phone: [phone] Fax: [phone]

## 2022-03-29 RX ORDER — VALACYCLOVIR HYDROCHLORIDE 1 G/1
TABLET, FILM COATED ORAL
Qty: 4 TABLET | Refills: 5 | Status: SHIPPED | OUTPATIENT
Start: 2022-03-29 | End: 2023-04-11

## 2022-04-21 ENCOUNTER — PATIENT MESSAGE (OUTPATIENT)
Dept: FAMILY MEDICINE CLINIC | Facility: CLINIC | Age: 57
End: 2022-04-21

## 2022-04-21 DIAGNOSIS — Z29.9 PREVENTIVE MEASURE: Primary | ICD-10-CM

## 2022-05-06 ENCOUNTER — APPOINTMENT (OUTPATIENT)
Dept: LAB | Facility: CLINIC | Age: 57
End: 2022-05-06
Payer: COMMERCIAL

## 2022-05-06 DIAGNOSIS — Z00.8 HEALTH EXAMINATION IN POPULATION SURVEY: ICD-10-CM

## 2022-05-06 DIAGNOSIS — Z29.9 PREVENTIVE MEASURE: ICD-10-CM

## 2022-05-06 DIAGNOSIS — E78.2 MIXED HYPERLIPIDEMIA: ICD-10-CM

## 2022-05-06 LAB
25(OH)D3 SERPL-MCNC: 58.2 NG/ML (ref 30–100)
ALBUMIN SERPL BCP-MCNC: 4.2 G/DL (ref 3.5–5)
ALP SERPL-CCNC: 66 U/L (ref 34–104)
ALT SERPL W P-5'-P-CCNC: 12 U/L (ref 7–52)
ANION GAP SERPL CALCULATED.3IONS-SCNC: 4 MMOL/L (ref 4–13)
AST SERPL W P-5'-P-CCNC: 17 U/L (ref 13–39)
BILIRUB SERPL-MCNC: 0.51 MG/DL (ref 0.2–1)
BUN SERPL-MCNC: 22 MG/DL (ref 5–25)
CALCIUM SERPL-MCNC: 8.9 MG/DL (ref 8.4–10.2)
CHLORIDE SERPL-SCNC: 104 MMOL/L (ref 96–108)
CHOLEST SERPL-MCNC: 219 MG/DL
CO2 SERPL-SCNC: 29 MMOL/L (ref 21–32)
CREAT SERPL-MCNC: 0.81 MG/DL (ref 0.6–1.3)
EST. AVERAGE GLUCOSE BLD GHB EST-MCNC: 108 MG/DL
GFR SERPL CREATININE-BSD FRML MDRD: 82 ML/MIN/1.73SQ M
GLUCOSE P FAST SERPL-MCNC: 90 MG/DL (ref 65–99)
HBA1C MFR BLD: 5.4 %
HDLC SERPL-MCNC: 55 MG/DL
LDLC SERPL CALC-MCNC: 138 MG/DL (ref 0–100)
NONHDLC SERPL-MCNC: 164 MG/DL
POTASSIUM SERPL-SCNC: 4.1 MMOL/L (ref 3.5–5.3)
PROT SERPL-MCNC: 7 G/DL (ref 6.4–8.4)
SODIUM SERPL-SCNC: 137 MMOL/L (ref 135–147)
TRIGL SERPL-MCNC: 132 MG/DL

## 2022-05-06 PROCEDURE — 36415 COLL VENOUS BLD VENIPUNCTURE: CPT

## 2022-05-06 PROCEDURE — 80061 LIPID PANEL: CPT

## 2022-05-06 PROCEDURE — 82306 VITAMIN D 25 HYDROXY: CPT

## 2022-05-06 PROCEDURE — 83036 HEMOGLOBIN GLYCOSYLATED A1C: CPT

## 2022-05-06 PROCEDURE — 80053 COMPREHEN METABOLIC PANEL: CPT

## 2022-05-13 ENCOUNTER — PATIENT MESSAGE (OUTPATIENT)
Dept: FAMILY MEDICINE CLINIC | Facility: CLINIC | Age: 57
End: 2022-05-13
Payer: COMMERCIAL

## 2022-05-13 DIAGNOSIS — U07.1 COVID: Primary | ICD-10-CM

## 2022-05-13 PROCEDURE — 87811 SARS-COV-2 COVID19 W/OPTIC: CPT | Performed by: FAMILY MEDICINE

## 2022-05-13 NOTE — TELEPHONE ENCOUNTER
Left non detailed message for pt to call office  Advised if symptoms worsen she should visit [facility] as our office will be closing soon

## 2022-05-13 NOTE — TELEPHONE ENCOUNTER
She hasn't been seen  Please call and see if she needs anything  She meets criteria for treatment based on age/bmi  Does she need an appt?   Will see her urgently now if she needs me to

## 2022-05-16 ENCOUNTER — TELEMEDICINE (OUTPATIENT)
Dept: FAMILY MEDICINE CLINIC | Facility: CLINIC | Age: 57
End: 2022-05-16
Payer: COMMERCIAL

## 2022-05-16 VITALS — HEIGHT: 65 IN | BODY MASS INDEX: 23.32 KG/M2 | WEIGHT: 140 LBS | TEMPERATURE: 99.2 F

## 2022-05-16 DIAGNOSIS — U07.1 COVID-19: Primary | ICD-10-CM

## 2022-05-16 LAB — SARS-COV-2 AG UPPER RESP QL IA: ABNORMAL

## 2022-05-16 PROCEDURE — 99214 OFFICE O/P EST MOD 30 MIN: CPT | Performed by: FAMILY MEDICINE

## 2022-05-16 RX ORDER — FLUTICASONE PROPIONATE 50 MCG
2 SPRAY, SUSPENSION (ML) NASAL DAILY PRN
Qty: 16 G | Refills: 1 | Status: SHIPPED | OUTPATIENT
Start: 2022-05-16

## 2022-05-16 NOTE — PROGRESS NOTES
COVID-19 Outpatient Progress Note    Assessment/Plan:    Problem List Items Addressed This Visit    None     Visit Diagnoses     COVID-19    -  Primary         Disposition:     Patient has COVID-19 infection  Based off CDC guidelines, they were recommended to isolate for 5 days from the date of the positive test  If they remain asymptomatic, isolation may be ended followed by 5 days of wearing a mask when around othes to minimize risk of infecting others  If they have a fever, continue to stay home until fever resolves for at least 24 hours  Prescription flonase sent to pharmacy  She will call if sinus pressure does not improve or gets worse  Ok to return to work, but mask `100% of the time    I have spent 12 minutes directly with the patient  Greater than 50% of this time was spent in counseling/coordination of care regarding: instructions for management  Encounter provider Diana Faustin MD    Provider located at [address] [place] [id number] [phone]    Recent Visits  No visits were found meeting these conditions  Showing recent visits within past 7 days and meeting all other requirements  Today's Visits  Date Type Provider Dept   05/16/22 Telemedicine Diana Faustin MD [facility] today's visits and meeting all other requirements  Future Appointments  No visits were found meeting these conditions  Showing future appointments within next 150 days and meeting all other requirements     This virtual check-in was done via telephone and she agrees to proceed  Patient agrees to participate in a virtual check in via telephone or video visit instead of presenting to the office to address urgent/immediate medical needs  Patient is aware this is a billable service  After connecting through Telephone, the patient was identified by name and date of birth   Marya Sahu was informed that this was a telemedicine visit and that the exam was being conducted confidentially over secure lines  My office door was closed  No one else was in the room  Dhruv Garcia acknowledged consent and understanding of privacy and security of the telemedicine visit  I informed the patient that I have reviewed her record in Epic and presented the opportunity for her to ask any questions regarding the visit today  The patient agreed to participate  It was my intent to perform this visit via video technology but the patient was not able to do a video connection so the visit was completed via audio telephone only  Verification of patient location:  Patient is located in the following state in which I hold an active license: [place]    Subjective:   Dhruv Garcia is a 55 y o  female who has been screened for COVID-19  Patient's symptoms include fever (99s), nasal congestion, rhinorrhea, sore throat, cough and headache  Patient denies shortness of breath, chest tightness, nausea, vomiting and diarrhea  - Date of symptom onset: 5/11/2022  - Date of positive COVID-19 test: 5/13/2022  Type of test: Home antigen  COVID-19 vaccination status: Fully vaccinated (primary series)    Wednesday sore throat  Thought it was allergies, took allergy medication wed/thursday  Minimal improvement  Did home test on 5/13/21 in the AM   No exposures except for patients  No home contacts  Taking benadryl or nyquil at night  Lozenges for throat  Friday started using nasal spray  Having sinus pressure  Long term loss of taste/smell from zicam many years ago  Sore throat is improving  Sinus pressure is worsening              Lab Results   Component Value Date    SARSCOV2 Negative 09/28/2021    SARSCOVAG Negative 01/17/2022    [facility] Positive (Patient Reported) (A) 05/13/2022     Past Medical History:   Diagnosis Date    Acid reflux     Allergic reaction to dye     Resolved - [id number]    Aneurysm of thoracic aorta ([address] )     last assess 2017    Anxiety  Arthritis     Atrophic vaginitis     Benign familial tremor     Last assessed - [id number]    Cervical back pain with evidence of disc disease     Chronic otitis media of right ear     Last assessed - 43Suv5165    Contact dermatitis     Last assessed - [id number]    Dermatitis     Disturbance of smell     Last assessed - 29Apr2013    DJD (degenerative joint disease)     Endometriosis     Last assessed - [id number]    Esophageal reflux     Last assessed - 44Xzj6923    Extremity pain     RUE    Folliculitis     Last assessed - 42Moq7518    Headache     Herpes simplex     High cholesterol     Hyperlipidemia     Last assessed - 82PSD3643    Lateral epicondylitis of left elbow     Last assessed - 25YNJ0217    Lumbar back pain     Myofascial pain syndrome     Neoplasm of skin     Last assessed - 68Kgq2146    Osteoarthritis     PONV (postoperative nausea and vomiting)     severe    Postmenopausal atrophic vaginitis     Last assessed - [id number]    Prophylactic antibiotic     Last assessed - 58XFV5260    Rotator cuff disorder     Sleep apnea     mild no cpap    Taste impairment     taste disturbances - Last assessed - 29Apr2013    Tremor, hereditary, benign     Wound, open, finger     Last assessed - [id number]     Past Surgical History:   Procedure Laterality Date    COLONOSCOPY      DIAGNOSTIC LAPAROSCOPY      LAPAROSCOPIC ENDOMETRIOSIS FULGURATION      Laparoscop excis endometriotic tissue uterosacral ligaments    IN COLONOSCOPY FLX DX W/COLLJ SPEC WHEN PFRMD N/A 12/6/2018    Procedure: COLONOSCOPY;  Surgeon: Shellie Mcdaniel DO;  Location: [facility]; Service: Gastroenterology    IN ESOPHAGOGASTRODUODENOSCOPY TRANSORAL DIAGNOSTIC N/A 12/6/2018    Procedure: ESOPHAGOGASTRODUODENOSCOPY (EGD); Surgeon: Shellie Mcdaniel DO;  Location: [facility];   Service: Gastroenterology    IN TOTAL HIP ARTHROPLASTY Left 5/16/2016    Procedure: ANTERIOR TOTAL HIP ARTHROPLASTY ;  Surgeon: Willi Vera MD;  [facility];  Service: Orthopedics    REVISION TOTAL HIP ARTHROPLASTY  06/15/2011    TOTAL HIP ARTHROPLASTY Right     TOTAL HIP ARTHROPLASTY  10/20/2010    UPPER GASTROINTESTINAL ENDOSCOPY       Current Outpatient Medications   Medication Sig Dispense Refill    albuterol (Ventolin HFA) 90 mcg/act inhaler Inhale 2 puffs every 4 (four) hours as needed for wheezing 8 5 g 0    ALPRAZolam (XANAX) 0 25 mg tablet Take 1 tablet (0 25 mg total) by mouth daily at bedtime as needed for anxiety 30 tablet 0    ascorbic acid (VITAMIN C) 500 mg tablet Take 500 mg by mouth daily       Calcium-Vitamin D (CALTRATE 600 PLUS-VIT D PO) Take 600 mg by mouth daily       clindamycin (CLEOCIN T) 1 % external solution apply topically to affected area AS NEEDED FOR FLARES OF THE SCALP  0    estradiol (VAGIFEM, YUVAFEM) 10 MCG TABS vaginal tablet 1 tablet vaginally q hs x 14 days then twice weekly 40 tablet 3    gabapentin (NEURONTIN) 100 mg capsule Take 1 capsule (100 mg total) by mouth 3 (three) times a day 90 capsule 1    naproxen (NAPROSYN) 500 mg tablet Take 1 tablet (500 mg total) by mouth 2 (two) times a day with meals (Patient taking differently: Take 500 mg by mouth as needed in the morning ) 60 tablet 1    orphenadrine (NORFLEX) 100 mg tablet Take 1 tablet (100 mg total) by mouth daily as needed for muscle spasms 30 tablet 1    Suvorexant (Belsomra) 15 MG TABS Take 1 tablet (15 mg total) by mouth daily at bedtime 30 tablet 0    traMADol (ULTRAM) 50 mg tablet Take 1 tablet (50 mg total) by mouth every 6 (six) hours as needed for severe pain 20 tablet 0    TURMERIC PO Take 1 capsule by mouth daily       valACYclovir (VALTREX) 1,000 mg tablet TAKE 1 TABLET BY MOUTH DAILY AS NEEDED FOR ORAL LESION FOR UP TO 4 DAYS 4 tablet 5    zinc gluconate 50 mg tablet Take 50 mg by mouth daily  No current facility-administered medications for this visit       Allergies   Allergen Reactions    Iodinated Diagnostic Agents Swelling and Eye Swelling     IVP dye specifically; swollen eye lid shut    Morphine Vomiting     Reaction Date: [id number];     Morphine And Related      DENIES ALLERGY RELATES N/V TO ANESTHESIA    Vicodin [Hydrocodone-Acetaminophen]      No allergy to tylenol   Does have N/V to vicoden    Effexor [Venlafaxine] Arthralgia    Gentamicin Rash     Reaction Date: [id number];        Review of Systems   Constitutional: Positive for fever (99s)  HENT: Positive for congestion, rhinorrhea and sore throat  Respiratory: Positive for cough  Negative for chest tightness and shortness of breath  Gastrointestinal: Negative for diarrhea, nausea and vomiting  Neurological: Positive for headaches  Objective:    Vitals:    05/16/22 0831   Temp: 99 2 °F (37 3 °C)   Weight: 63 5 kg (140 lb)   Height: 5' 4 5" (1 638 m)       Physical Exam  No physical exam- had to switch to telephone, patient unable to access video by Stupil or by epic embedded  VIRTUAL VISIT DISCLAIMER    Maria Alejandra Silva verbally agrees to participate in [employer]  Pt is aware that [employer] could be limited without vital signs or the ability to perform a full hands-on physical exam  Maria Alejandra Bran understands she or the provider may request at any time to terminate the video visit and request the patient to seek care or treatment in person

## 2022-05-25 DIAGNOSIS — G47.00 INSOMNIA, UNSPECIFIED TYPE: ICD-10-CM

## 2022-05-25 RX ORDER — SUVOREXANT 15 MG/1
1 TABLET, FILM COATED ORAL
Qty: 30 TABLET | Refills: 0 | Status: SHIPPED | OUTPATIENT
Start: 2022-05-25 | End: 2022-07-05 | Stop reason: SDUPTHER

## 2022-05-25 NOTE — TELEPHONE ENCOUNTER
Medication refill requested: Belsomra  Last office visit: 5/16/22, ill  Next office visit: 1/27/23  Last refilled: 3/11/22 #30x0  Ordering Provider: Alba Mishra (select pharmacy send RX to): [address] [id number]  Phone: [phone] Fax: [phone]

## 2022-05-26 ENCOUNTER — TELEPHONE (OUTPATIENT)
Dept: GYNECOLOGY | Facility: CLINIC | Age: 57
End: 2022-05-26

## 2022-05-26 DIAGNOSIS — G47.00 INSOMNIA, UNSPECIFIED TYPE: ICD-10-CM

## 2022-05-26 RX ORDER — SUVOREXANT 15 MG/1
1 TABLET, FILM COATED ORAL
Qty: 30 TABLET | Refills: 0 | Status: CANCELLED | OUTPATIENT
Start: 2022-05-26

## 2022-05-26 NOTE — TELEPHONE ENCOUNTER
Patient needs refill of Vagifem sent to [facility]  She did make AE appt for 6/10    She wants Jose Luis taken out of chart please

## 2022-05-27 DIAGNOSIS — N95.2 ATROPHIC VAGINITIS: ICD-10-CM

## 2022-05-27 RX ORDER — ESTRADIOL 10 UG/1
1 INSERT VAGINAL 2 TIMES WEEKLY
Qty: 28 TABLET | Refills: 3 | Status: SHIPPED | OUTPATIENT
Start: 2022-05-30

## 2022-05-27 RX ORDER — ESTRADIOL 10 UG/1
INSERT VAGINAL
Qty: 40 TABLET | Refills: 3 | Status: SHIPPED | OUTPATIENT
Start: 2022-05-27 | End: 2022-05-27 | Stop reason: SDUPTHER

## 2022-06-21 DIAGNOSIS — F41.9 ANXIETY: Chronic | ICD-10-CM

## 2022-06-21 RX ORDER — ALPRAZOLAM 0.25 MG/1
0.25 TABLET ORAL
Qty: 30 TABLET | Refills: 0 | Status: SHIPPED | OUTPATIENT
Start: 2022-06-21

## 2022-06-21 NOTE — TELEPHONE ENCOUNTER
Medication refill requested:     ALPRAZolam Larina Chi) 0 25 mg tablet [  Last office visit: 05/16/2022  Next office visit: 1/27/2023  Last refilled: 11/1/2022  Labs: No  Ordering Provider: Alba Mishra (select pharmacy send RX to):     [address] [place] [id number]  Phone: [phone] Fax: [phone]

## 2022-06-23 ENCOUNTER — ANNUAL EXAM (OUTPATIENT)
Dept: GYNECOLOGY | Facility: CLINIC | Age: 57
End: 2022-06-23
Payer: COMMERCIAL

## 2022-06-23 VITALS
SYSTOLIC BLOOD PRESSURE: 100 MMHG | WEIGHT: 140 LBS | HEIGHT: 65 IN | HEART RATE: 50 BPM | BODY MASS INDEX: 23.32 KG/M2 | DIASTOLIC BLOOD PRESSURE: 62 MMHG

## 2022-06-23 DIAGNOSIS — N95.2 ATROPHIC VAGINITIS: ICD-10-CM

## 2022-06-23 DIAGNOSIS — Z12.31 ENCOUNTER FOR SCREENING MAMMOGRAM FOR MALIGNANT NEOPLASM OF BREAST: Primary | ICD-10-CM

## 2022-06-23 DIAGNOSIS — Z01.419 ENCOUNTER FOR GYNECOLOGICAL EXAMINATION WITH PAPANICOLAOU SMEAR OF CERVIX: ICD-10-CM

## 2022-06-23 DIAGNOSIS — R52 PAIN: ICD-10-CM

## 2022-06-23 PROCEDURE — 99396 PREV VISIT EST AGE 40-64: CPT | Performed by: OBSTETRICS & GYNECOLOGY

## 2022-06-23 PROCEDURE — G0145 SCR C/V CYTO,THINLAYER,RESCR: HCPCS | Performed by: OBSTETRICS & GYNECOLOGY

## 2022-06-23 NOTE — PROGRESS NOTES
Assessment/Plan:         Diagnoses and all orders for this visit:    Encounter for screening mammogram for malignant neoplasm of breast  -     Mammo screening bilateral w 3d & cad; Future    Pain  -     CT abdomen pelvis wo contrast; Future ; if negative will refer back to PCP for further evaluation    Atrophic vaginitis; continue Vagifem        Subjective:      Patient ID: Drake Jackson is a 55 y o  female  HPI  patient presents to the office for annual examination  She also presents complaining of sharp intermittent lower abdominal pain  This can alternate sides  The discomfort can last anywhere from a few hours to days  She has used tramadol in the past which does helps although she prefers not to take this  When she has the pain she has no associated nausea, vomiting, diarrhea or constipation  She did have a colonoscopy in December 2018 which was normal   But she repeats this every 5 years secondary to family history  She denies any dysuria, hematuria urgency or urinary incontinence      Osteoporosis screening via heel scan May 20 21:-0 3    The following portions of the patient's history were reviewed and updated as appropriate:   She  has a past medical history of Acid reflux, Allergic reaction to dye, Aneurysm of thoracic aorta (HCC), Anxiety, Arthritis, Atrophic vaginitis, Benign familial tremor, Cervical back pain with evidence of disc disease, Chronic otitis media of right ear, Contact dermatitis, Dermatitis, Disturbance of smell, DJD (degenerative joint disease), Endometriosis, Esophageal reflux, Extremity pain, Folliculitis, Headache, Herpes simplex, High cholesterol, Hyperlipidemia, Lateral epicondylitis of left elbow, Lumbar back pain, Myofascial pain syndrome, Neoplasm of skin, Osteoarthritis, PONV (postoperative nausea and vomiting), Postmenopausal atrophic vaginitis, Prophylactic antibiotic, Rotator cuff disorder, Sleep apnea, Taste impairment, Tremor, hereditary, benign, and Wound, open, finger  She   Patient Active Problem List    Diagnosis Date Noted    Glenohumeral arthritis, right 06/15/2021    Cervical disc disorder with radiculopathy of mid-cervical region     Tendinitis of right rotator cuff 08/22/2019    Subacromial bursitis of right shoulder joint 08/22/2019    Bee sting reaction 07/16/2019    Insomnia 05/23/2019    IT band syndrome 05/23/2019    Biceps tendinitis 05/23/2019    Acromioclavicular joint arthritis 05/23/2019    Knee pain, chronic 03/08/2019    Chronic midline thoracic back pain 12/24/2018    Colon cancer screening 09/07/2018    Chronic fatigue 07/11/2018    Bilateral carpal tunnel syndrome 05/10/2018    Acute non-recurrent sinusitis 03/05/2018    Endometrial polyp 05/16/2017    Postmenopausal bleeding 05/16/2017    BRENDA (obstructive sleep apnea) 10/12/2016    Episodic tension-type headache, not intractable 10/05/2016    Status post total replacement of left hip 05/17/2016    Anxiety 05/17/2016    Menopause 05/10/2016    DJD (degenerative joint disease)     Dense breasts 04/07/2016    Hemangioma of skin 03/10/2015    Lumbar degenerative disc disease 06/03/2014    Chronic lower back pain 05/06/2014    Pelvic and perineal pain 01/24/2014    Pelvic congestion syndrome 01/24/2014    Cervical radiculopathy 08/26/2013    DJD (degenerative joint disease), cervical 08/26/2013    Sleep apnea 07/11/2013    Herniated cervical disc 06/24/2013    Herpes simplex infection 11/26/2012    Benign familial tremor 06/14/2012    Endometriosis 06/14/2012    Hyperlipidemia 06/14/2012     She  has a past surgical history that includes Diagnostic laparoscopy; Total hip arthroplasty (Right); Revision total hip arthroplasty (06/15/2011); pr total hip arthroplasty (Left, 5/16/2016); Total hip arthroplasty (10/20/2010); Laparoscopic endometriosis fulguration; Colonoscopy;  Upper gastrointestinal endoscopy; pr esophagogastroduodenoscopy transoral diagnostic (N/A, 12/6/2018); and pr colonoscopy flx dx w/collj spec when pfrmd (N/A, 12/6/2018)  Her family history includes Arthritis in her brother and father; Atrial fibrillation in her mother; Breast cancer in her other and other; Breast cancer (age of onset: 48) in her maternal aunt; Breast cancer (age of onset: 72) in her maternal uncle; Colon cancer (age of onset: 66) in her paternal grandmother; Diverticulitis in her mother; Esophageal cancer (age of onset: 80) in her father; Hypertension in her mother; No Known Problems in her brother, brother, maternal aunt, maternal grandfather, maternal grandmother, paternal grandfather, sister, and sister; Other in her brother; Prostate cancer (age of onset: [de-identified]) in her paternal uncle; Stroke in her father and mother  She  reports that she has never smoked  She has never used smokeless tobacco  She reports current alcohol use  She reports that she does not use drugs    Current Outpatient Medications   Medication Sig Dispense Refill    ALPRAZolam (XANAX) 0 25 mg tablet Take 1 tablet (0 25 mg total) by mouth daily at bedtime as needed for anxiety 30 tablet 0    ascorbic acid (VITAMIN C) 500 mg tablet Take 500 mg by mouth daily       Calcium-Vitamin D (CALTRATE 600 PLUS-VIT D PO) Take 600 mg by mouth daily       clindamycin (CLEOCIN T) 1 % external solution apply topically to affected area AS NEEDED FOR FLARES OF THE SCALP  0    estradiol (VAGIFEM, YUVAFEM) 10 MCG TABS vaginal tablet Insert 1 tablet (10 mcg total) into the vagina 2 (two) times a week 28 tablet 3    gabapentin (NEURONTIN) 100 mg capsule Take 1 capsule (100 mg total) by mouth 3 (three) times a day 90 capsule 1    naproxen (NAPROSYN) 500 mg tablet Take 1 tablet (500 mg total) by mouth 2 (two) times a day with meals (Patient taking differently: Take 500 mg by mouth as needed) 60 tablet 1    orphenadrine (NORFLEX) 100 mg tablet Take 1 tablet (100 mg total) by mouth daily as needed for muscle spasms 30 tablet 1    Suvorexant (Belsomra) 15 MG TABS Take 1 tablet (15 mg total) by mouth daily at bedtime 30 tablet 0    traMADol (ULTRAM) 50 mg tablet Take 1 tablet (50 mg total) by mouth every 6 (six) hours as needed for severe pain 20 tablet 0    TURMERIC PO Take 1 capsule by mouth daily       valACYclovir (VALTREX) 1,000 mg tablet TAKE 1 TABLET BY MOUTH DAILY AS NEEDED FOR ORAL LESION FOR UP TO 4 DAYS 4 tablet 5    zinc gluconate 50 mg tablet Take 50 mg by mouth daily   albuterol (Ventolin HFA) 90 mcg/act inhaler Inhale 2 puffs every 4 (four) hours as needed for wheezing (Patient not taking: Reported on 6/23/2022) 8 5 g 0    fluticasone (FLONASE) 50 mcg/act nasal spray 2 sprays into each nostril as needed in the morning for rhinitis  (Patient not taking: Reported on 6/23/2022) 16 g 1     No current facility-administered medications for this visit       Current Outpatient Medications on File Prior to Visit   Medication Sig    ALPRAZolam (XANAX) 0 25 mg tablet Take 1 tablet (0 25 mg total) by mouth daily at bedtime as needed for anxiety    ascorbic acid (VITAMIN C) 500 mg tablet Take 500 mg by mouth daily     Calcium-Vitamin D (CALTRATE 600 PLUS-VIT D PO) Take 600 mg by mouth daily     clindamycin (CLEOCIN T) 1 % external solution apply topically to affected area AS NEEDED FOR FLARES OF THE SCALP    estradiol (VAGIFEM, YUVAFEM) 10 MCG TABS vaginal tablet Insert 1 tablet (10 mcg total) into the vagina 2 (two) times a week    gabapentin (NEURONTIN) 100 mg capsule Take 1 capsule (100 mg total) by mouth 3 (three) times a day    naproxen (NAPROSYN) 500 mg tablet Take 1 tablet (500 mg total) by mouth 2 (two) times a day with meals (Patient taking differently: Take 500 mg by mouth as needed)    orphenadrine (NORFLEX) 100 mg tablet Take 1 tablet (100 mg total) by mouth daily as needed for muscle spasms    Suvorexant (Belsomra) 15 MG TABS Take 1 tablet (15 mg total) by mouth daily at bedtime    traMADol (ULTRAM) 50 mg tablet Take 1 tablet (50 mg total) by mouth every 6 (six) hours as needed for severe pain    TURMERIC PO Take 1 capsule by mouth daily     valACYclovir (VALTREX) 1,000 mg tablet TAKE 1 TABLET BY MOUTH DAILY AS NEEDED FOR ORAL LESION FOR UP TO 4 DAYS    zinc gluconate 50 mg tablet Take 50 mg by mouth daily   albuterol (Ventolin HFA) 90 mcg/act inhaler Inhale 2 puffs every 4 (four) hours as needed for wheezing (Patient not taking: Reported on 6/23/2022)    fluticasone (FLONASE) 50 mcg/act nasal spray 2 sprays into each nostril as needed in the morning for rhinitis  (Patient not taking: Reported on 6/23/2022)     No current facility-administered medications on file prior to visit  She is allergic to iodinated diagnostic agents, morphine, morphine and related, vicodin [hydrocodone-acetaminophen], effexor [venlafaxine], and gentamicin       Review of Systems   Constitutional: Negative  HENT: Negative for sore throat and trouble swallowing  Gastrointestinal: Positive for abdominal pain  Negative for abdominal distention, blood in stool, constipation, diarrhea, nausea and vomiting  Genitourinary: Negative  Objective:      /62   Pulse (!) 50   Ht 5' 4 5" (1 638 m)   Wt 63 5 kg (140 lb)   BMI 23 66 kg/m²          Physical Exam  Vitals reviewed  Constitutional:       Appearance: Normal appearance  She is normal weight  Cardiovascular:      Rate and Rhythm: Normal rate and regular rhythm  Pulses: Normal pulses  Heart sounds: Normal heart sounds  No murmur heard  Pulmonary:      Effort: Pulmonary effort is normal  No respiratory distress  Breath sounds: Normal breath sounds  Chest:   Breasts:      Right: No swelling, bleeding, inverted nipple, mass, nipple discharge, skin change, tenderness, axillary adenopathy or supraclavicular adenopathy        Left: No swelling, bleeding, inverted nipple, mass, nipple discharge, skin change, tenderness, axillary adenopathy or supraclavicular adenopathy  Abdominal:      General: There is no distension  Palpations: Abdomen is soft  There is no mass  Tenderness: There is no abdominal tenderness  There is no guarding or rebound  Hernia: No hernia is present  There is no hernia in the left inguinal area or right inguinal area  Genitourinary:     General: Normal vulva  Labia:         Right: No rash, tenderness or lesion  Left: No rash, tenderness or lesion  Vagina: Normal       Cervix: Normal       Uterus: Normal        Adnexa:         Right: No mass, tenderness or fullness  Left: No mass, tenderness or fullness  Musculoskeletal:      Cervical back: Normal range of motion and neck supple  No tenderness  Lymphadenopathy:      Cervical: No cervical adenopathy  Upper Body:      Right upper body: No supraclavicular, axillary or pectoral adenopathy  Left upper body: No supraclavicular, axillary or pectoral adenopathy  Lower Body: No right inguinal adenopathy  No left inguinal adenopathy  Neurological:      Mental Status: She is alert

## 2022-06-29 ENCOUNTER — DOCUMENTATION (OUTPATIENT)
Dept: GYNECOLOGY | Facility: CLINIC | Age: 57
End: 2022-06-29

## 2022-06-30 LAB
LAB AP GYN PRIMARY INTERPRETATION: NORMAL
Lab: NORMAL

## 2022-07-05 DIAGNOSIS — G47.00 INSOMNIA, UNSPECIFIED TYPE: ICD-10-CM

## 2022-07-06 NOTE — TELEPHONE ENCOUNTER
Medication refill requested: Suvorexant (Belsomra) 15 MG TABS  Last office visit: 1/21/2022  Next office visit: 01/27/2023  Last refilled: 05/25/2022  Labs: No  Ordering Provider: Alba Mishra (select pharmacy send RX to):       [address] [address]  Phone: [phone] Fax: [phone]

## 2022-07-07 RX ORDER — SUVOREXANT 15 MG/1
1 TABLET, FILM COATED ORAL
Qty: 30 TABLET | Refills: 0 | Status: SHIPPED | OUTPATIENT
Start: 2022-07-07 | End: 2022-09-09 | Stop reason: SDUPTHER

## 2022-07-20 ENCOUNTER — HOSPITAL ENCOUNTER (OUTPATIENT)
Dept: CT IMAGING | Facility: HOSPITAL | Age: 57
Discharge: HOME | End: 2022-07-20
Attending: OBSTETRICS & GYNECOLOGY
Payer: COMMERCIAL

## 2022-07-20 DIAGNOSIS — R52 PAIN: ICD-10-CM

## 2022-07-20 PROCEDURE — 74176 CT ABD & PELVIS W/O CONTRAST: CPT

## 2022-07-27 ENCOUNTER — DOCUMENTATION (OUTPATIENT)
Dept: GYNECOLOGY | Facility: CLINIC | Age: 57
End: 2022-07-27

## 2022-08-13 ENCOUNTER — TELEPHONE (OUTPATIENT)
Dept: FAMILY MEDICINE CLINIC | Facility: CLINIC | Age: 57
End: 2022-08-13

## 2022-08-13 DIAGNOSIS — M10.9 GOUTY ARTHRITIS: Primary | ICD-10-CM

## 2022-08-13 RX ORDER — COLCHICINE 0.6 MG/1
TABLET ORAL
Qty: 10 TABLET | Refills: 0 | Status: SHIPPED | OUTPATIENT
Start: 2022-08-13

## 2022-08-13 NOTE — TELEPHONE ENCOUNTER
Pt called with acute gouty arthritis    I sent in Colchicine 0 6mg #10 until she can see her usual pcp

## 2022-09-09 DIAGNOSIS — G47.00 INSOMNIA, UNSPECIFIED TYPE: ICD-10-CM

## 2022-09-09 RX ORDER — SUVOREXANT 15 MG/1
1 TABLET, FILM COATED ORAL
Qty: 90 TABLET | Refills: 0 | Status: SHIPPED | OUTPATIENT
Start: 2022-09-09

## 2022-09-09 NOTE — TELEPHONE ENCOUNTER
Medication refill requested: Suvorexant (Belsomra) 15 MG TABS  Last office visit: 1/21/2022  Next office visit: 01/27/2023  Last refilled: 07/07/20222  Labs: No  Ordering Provider: [facility]       Pharmacy (select pharmacy send RX to):         [address] [address]  Phone: [phone] Fax: [phone]

## 2022-09-14 ENCOUNTER — HOSPITAL ENCOUNTER (OUTPATIENT)
Dept: MAMMOGRAPHY | Facility: HOSPITAL | Age: 57
Discharge: HOME | End: 2022-09-14
Attending: OBSTETRICS & GYNECOLOGY
Payer: COMMERCIAL

## 2022-09-14 VITALS — HEIGHT: 65 IN | BODY MASS INDEX: 23.32 KG/M2 | WEIGHT: 139.99 LBS

## 2022-09-14 DIAGNOSIS — Z12.31 ENCOUNTER FOR SCREENING MAMMOGRAM FOR MALIGNANT NEOPLASM OF BREAST: ICD-10-CM

## 2022-09-14 PROCEDURE — 77067 SCR MAMMO BI INCL CAD: CPT

## 2022-09-14 PROCEDURE — 77063 BREAST TOMOSYNTHESIS BI: CPT

## 2022-11-20 DIAGNOSIS — G47.00 INSOMNIA, UNSPECIFIED TYPE: ICD-10-CM

## 2022-11-21 RX ORDER — SUVOREXANT 15 MG/1
1 TABLET, FILM COATED ORAL
Qty: 90 TABLET | Refills: 0 | Status: SHIPPED | OUTPATIENT
Start: 2022-11-21

## 2022-11-25 ENCOUNTER — OFFICE VISIT (OUTPATIENT)
Dept: FAMILY MEDICINE CLINIC | Facility: CLINIC | Age: 57
End: 2022-11-25

## 2022-11-25 VITALS
SYSTOLIC BLOOD PRESSURE: 112 MMHG | HEIGHT: 65 IN | WEIGHT: 142.6 LBS | HEART RATE: 71 BPM | TEMPERATURE: 97.5 F | DIASTOLIC BLOOD PRESSURE: 78 MMHG | OXYGEN SATURATION: 98 % | BODY MASS INDEX: 23.76 KG/M2 | RESPIRATION RATE: 14 BRPM

## 2022-11-25 DIAGNOSIS — B34.9 VIRAL INFECTION, UNSPECIFIED: Primary | ICD-10-CM

## 2022-11-25 DIAGNOSIS — J02.9 PHARYNGITIS, UNSPECIFIED ETIOLOGY: ICD-10-CM

## 2022-11-25 DIAGNOSIS — R05.1 ACUTE COUGH: ICD-10-CM

## 2022-11-25 RX ORDER — BENZONATATE 100 MG/1
100 CAPSULE ORAL 3 TIMES DAILY PRN
Qty: 30 CAPSULE | Refills: 0 | Status: SHIPPED | OUTPATIENT
Start: 2022-11-25 | End: 2022-12-05

## 2022-11-25 RX ORDER — LIDOCAINE HYDROCHLORIDE 20 MG/ML
15 SOLUTION OROPHARYNGEAL 4 TIMES DAILY PRN
Qty: 200 ML | Refills: 0 | Status: SHIPPED | OUTPATIENT
Start: 2022-11-25 | End: 2022-12-05

## 2022-11-25 NOTE — PROGRESS NOTES
COVID-19 Outpatient Progress Note    Assessment/Plan:    Problem List Items Addressed This Visit    None  Visit Diagnoses     Viral infection, unspecified    -  Primary    Acute cough        Relevant Medications    benzonatate (TESSALON PERLES) 100 mg capsule    Pharyngitis, unspecified etiology        Relevant Medications    Lidocaine Viscous HCl (XYLOCAINE) 2 % mucosal solution         Disposition:     Discussed symptom directed medication options with patient  Viral Illness - repeat COVID home test 24 hours after last test     Start Tessalon Perles 100mg TID PRN cough, Viscous Lidocaine PRN  Do not use Keflex  Advise Rosendo Phlegm med sinus rinse kit, Mucinex, Claritin/Zyrtec/Allegra, Flonase / Nasacort nasal spray  Avoid decongestants if you have high blood pressure  Restart Albuterol HFA PRN  I have spent 20 minutes directly with the patient  Greater than 50% of this time was spent in counseling/coordination of care regarding: diagnostic results, prognosis, risks and benefits of treatment options, instructions for management, patient and family education, importance of treatment compliance, risk factor reductions and impressions  Encounter provider: Quintin Smith DO     Provider located at: [address] [place] [id number] [phone]     Recent Visits  No visits were found meeting these conditions  Showing recent visits within past 7 days and meeting all other requirements  Today's Visits  Date Type Provider Dept   11/25/22 Office Visit Arnol Souza DO [facility] today's visits and meeting all other requirements  Future Appointments  No visits were found meeting these conditions  Showing future appointments within next 150 days and meeting all other requirements     Subjective:   Merline Lean is a 65 y o  female who is concerned about COVID-19   Patient's symptoms include fatigue, nasal congestion, rhinorrhea, sore throat, cough (Productive), myalgias (Improving) and headache  Patient denies fever, chills, anosmia, loss of taste, shortness of breath, chest tightness, abdominal pain, nausea, vomiting and diarrhea  - Date of symptom onset: 11/25/2022      COVID-19 vaccination status: Fully vaccinated (primary series)    Negative home COVID test 11/25/22  She took leftover Keflex 2 pills on Tuesday and 2 pills on Wednesday s benefit - AMA  Taking Motrin/Tylenol - last dose Wednesday  Using Nyquil for sleep and Flonase  Lab Results   Component Value Date    SARSCOV2 Negative 09/28/2021    SARSCOVAG Negative 01/17/2022    [facility] Positive (Patient Reported) (A) 05/13/2022       Review of Systems   Constitutional: Positive for fatigue  Negative for chills and fever  HENT: Positive for congestion, rhinorrhea and sore throat  Respiratory: Positive for cough (Productive)  Negative for chest tightness and shortness of breath  Gastrointestinal: Negative for abdominal pain, diarrhea, nausea and vomiting  Musculoskeletal: Positive for myalgias (Improving)  Neurological: Positive for headaches  Current Outpatient Medications on File Prior to Visit   Medication Sig   • ALPRAZolam (XANAX) 0 25 mg tablet Take 1 tablet (0 25 mg total) by mouth daily at bedtime as needed for anxiety   • ascorbic acid (VITAMIN C) 500 mg tablet Take 500 mg by mouth daily    • Calcium-Vitamin D (CALTRATE 600 PLUS-VIT D PO) Take 600 mg by mouth daily    • clindamycin (CLEOCIN T) 1 % external solution apply topically to affected area AS NEEDED FOR FLARES OF THE SCALP   • estradiol (VAGIFEM, YUVAFEM) 10 MCG TABS vaginal tablet Insert 1 tablet (10 mcg total) into the vagina 2 (two) times a week   • fluticasone (FLONASE) 50 mcg/act nasal spray 2 sprays into each nostril as needed in the morning for rhinitis     • naproxen (NAPROSYN) 500 mg tablet Take 1 tablet (500 mg total) by mouth 2 (two) times a day with meals (Patient taking differently: Take 500 mg by mouth as needed)   • orphenadrine (NORFLEX) 100 mg tablet Take 1 tablet (100 mg total) by mouth daily as needed for muscle spasms   • Suvorexant (Belsomra) 15 MG TABS Take 1 tablet (15 mg total) by mouth daily at bedtime   • traMADol (ULTRAM) 50 mg tablet Take 1 tablet (50 mg total) by mouth every 6 (six) hours as needed for severe pain   • TURMERIC PO Take 1 capsule by mouth daily    • valACYclovir (VALTREX) 1,000 mg tablet TAKE 1 TABLET BY MOUTH DAILY AS NEEDED FOR ORAL LESION FOR UP TO 4 DAYS   • zinc gluconate 50 mg tablet Take 50 mg by mouth daily  • albuterol (Ventolin HFA) 90 mcg/act inhaler Inhale 2 puffs every 4 (four) hours as needed for wheezing (Patient not taking: Reported on 11/25/2022)   • colchicine (COLCRYS) 0 6 mg tablet Take 1 tablet 3 times daily as needed for acute gout (Patient not taking: Reported on 11/25/2022)   • gabapentin (NEURONTIN) 100 mg capsule Take 1 capsule (100 mg total) by mouth 3 (three) times a day (Patient not taking: Reported on 11/25/2022)       Objective:    /78   Pulse 71   Temp 97 5 °F (36 4 °C)   Resp 14   Ht 5' 4 5" (1 638 m)   Wt 64 7 kg (142 lb 9 6 oz)   SpO2 98%   BMI 24 10 kg/m²      Physical Exam  Vitals and nursing note reviewed  Constitutional:       General: She is not in acute distress  Appearance: Normal appearance  She is well-developed and normal weight  HENT:      Head: Normocephalic and atraumatic  Right Ear: Tympanic membrane, ear canal and external ear normal       Left Ear: Tympanic membrane, ear canal and external ear normal       Nose: Nose normal       Mouth/Throat:      Mouth: Mucous membranes are moist       Pharynx: Oropharynx is clear  No oropharyngeal exudate or posterior oropharyngeal erythema  Eyes:      Extraocular Movements: Extraocular movements intact        Conjunctiva/sclera: Conjunctivae normal    Cardiovascular:      Rate and Rhythm: Normal rate and regular rhythm  Pulses: Normal pulses  Heart sounds: Normal heart sounds  No murmur heard  Pulmonary:      Effort: Pulmonary effort is normal  No respiratory distress  Breath sounds: Normal breath sounds  Musculoskeletal:         General: No swelling or tenderness  Cervical back: Neck supple  Right lower leg: No edema  Left lower leg: No edema  Lymphadenopathy:      Cervical: No cervical adenopathy  Skin:     General: Skin is warm and dry  Capillary Refill: Capillary refill takes less than 2 seconds  Neurological:      General: No focal deficit present  Mental Status: She is alert and oriented to person, place, and time     Psychiatric:         Mood and Affect: Mood normal        Daryl Mcconnell DO

## 2022-11-25 NOTE — PATIENT INSTRUCTIONS
Advise Echo Bettencourt med sinus rinse kit, Mucinex, Claritin/Zyrtec/Allegra, Flonase / Nasacort nasal spray  Avoid decongestants if you have high blood pressure

## 2022-11-29 ENCOUNTER — IMMUNIZATIONS (OUTPATIENT)
Dept: FAMILY MEDICINE CLINIC | Facility: CLINIC | Age: 57
End: 2022-11-29

## 2022-11-29 DIAGNOSIS — Z23 ENCOUNTER FOR IMMUNIZATION: Primary | ICD-10-CM

## 2022-12-02 ENCOUNTER — TELEPHONE (OUTPATIENT)
Dept: FAMILY MEDICINE CLINIC | Facility: CLINIC | Age: 57
End: 2022-12-02

## 2022-12-02 DIAGNOSIS — B34.9 VIRAL INFECTION, UNSPECIFIED: Primary | ICD-10-CM

## 2022-12-02 DIAGNOSIS — R05.1 ACUTE COUGH: ICD-10-CM

## 2022-12-02 RX ORDER — DEXTROMETHORPHAN HYDROBROMIDE AND PROMETHAZINE HYDROCHLORIDE 15; 6.25 MG/5ML; MG/5ML
5 SOLUTION ORAL 4 TIMES DAILY PRN
Qty: 118 ML | Refills: 0 | Status: SHIPPED | OUTPATIENT
Start: 2022-12-02 | End: 2022-12-12

## 2022-12-02 NOTE — TELEPHONE ENCOUNTER
From OV 11/25/22:    Start Tessalon Perles 100mg TID PRN cough, Viscous Lidocaine PRN  Do not use Keflex      Advise Echo Bettencourt med sinus rinse kit, Mucinex, Claritin/Zyrtec/Allegra, Flonase / Nasacort nasal spray  Avoid decongestants if you have high blood pressure      Restart Albuterol HFA PRN  eRx sent for Promth-DM 5mL QID PRN cough  May cause drowsiness  Use heating pad, Freddie's, Icy Hot PRN chest soreness

## 2022-12-02 NOTE — TELEPHONE ENCOUNTER
Pt saw you last week for viral illness  She was prescribed Tessalon and states she is coughing more persistently  She has also been using Mucinex as well as Nyquil  No SOB, but is not sleeping due to cough  No fever or new symptoms  Sputum is yellow each time she coughs  States her muscles hurt from coughing so persistently  Asking if you can recommend anything else

## 2022-12-20 ENCOUNTER — NURSE TRIAGE (OUTPATIENT)
Dept: OTHER | Facility: OTHER | Age: 57
End: 2022-12-20

## 2022-12-20 NOTE — TELEPHONE ENCOUNTER
Regarding: congestion/cough  ----- Message from [facility] sent at 12/20/2022 12:06 PM EST -----  "I have been experiencing congestion and coughing   It's keeping me up at night "

## 2022-12-20 NOTE — TELEPHONE ENCOUNTER
Patient with cough and congestion  Patient states last time she was seen in office for similar symptoms and prescribed tessalon pearls and they did not work  Provider placed patient on phenergan, and patient is currently requesting another prescription for cough  Please follow up wit patient regarding medication  Home care advice given  Reason for Disposition  • [1] Caller has NON-URGENT medicine question about med that PCP prescribed AND [2] triager unable to answer question    Answer Assessment - Initial Assessment Questions  1  ONSET: "When did the symptoms start?"       12/12    2  AMOUNT: "How much discharge is there?"       Patient is congested    3  COUGH: "Do you have a cough?" If yes, ask: "Describe the color of your sputum" (clear, white, yellow, green)      Productive wet- yellow mucus    4  RESPIRATORY DISTRESS: "Describe your breathing "       Denies    5  FEVER: "Do you have a fever?" If Yes, ask: "What is your temperature, how was it measured, and when did it start?"    Denies      6  SEVERITY: "Overall, how bad are you feeling right now?" (e g , doesn't interfere with normal activities, staying home from school/work, staying in bed)       Weak    7  OTHER SYMPTOMS: "Do you have any other symptoms?" (e g , sore throat, earache, wheezing, vomiting)      Congestion    8   PREGNANCY: "Is there any chance you are pregnant?" "When was your last menstrual period?"      N/A    Taking mucinex, dayquil, nyquil, benadryl    Protocols used: MEDICATION REFILL AND RENEWAL CALL-ADULT-, COMMON COLD-ADULT-

## 2022-12-22 ENCOUNTER — TELEPHONE (OUTPATIENT)
Dept: OTHER | Facility: OTHER | Age: 57
End: 2022-12-22

## 2022-12-22 NOTE — TELEPHONE ENCOUNTER
Per patient's phone call, she is returning missed phone call     please leave detailed message per patient's request

## 2022-12-22 NOTE — TELEPHONE ENCOUNTER
Patient spoke with a triage nurse two days ago  Her symptoms are still persisting  She is currently taking OTC medications, but they are not helping

## 2022-12-23 NOTE — TELEPHONE ENCOUNTER
LM for a return call-did mention it is hard to leave a detailed message to triage   It requires speaking to the patient about symptoms, treatments, time frame, meds tried, etc

## 2023-01-16 DIAGNOSIS — G47.00 INSOMNIA, UNSPECIFIED TYPE: ICD-10-CM

## 2023-01-16 RX ORDER — SUVOREXANT 15 MG/1
1 TABLET, FILM COATED ORAL
Qty: 90 TABLET | Refills: 0 | Status: SHIPPED | OUTPATIENT
Start: 2023-01-16

## 2023-01-26 ENCOUNTER — OFFICE VISIT (OUTPATIENT)
Dept: FAMILY MEDICINE CLINIC | Facility: CLINIC | Age: 58
End: 2023-01-26

## 2023-01-26 VITALS
BODY MASS INDEX: 23.76 KG/M2 | WEIGHT: 142.6 LBS | HEIGHT: 65 IN | SYSTOLIC BLOOD PRESSURE: 126 MMHG | DIASTOLIC BLOOD PRESSURE: 64 MMHG | TEMPERATURE: 98.1 F

## 2023-01-26 DIAGNOSIS — M10.9 GOUT, UNSPECIFIED CAUSE, UNSPECIFIED CHRONICITY, UNSPECIFIED SITE: Primary | ICD-10-CM

## 2023-01-26 DIAGNOSIS — G47.33 OSA (OBSTRUCTIVE SLEEP APNEA): ICD-10-CM

## 2023-01-26 DIAGNOSIS — M19.012 ARTHRITIS OF LEFT ACROMIOCLAVICULAR JOINT: ICD-10-CM

## 2023-01-26 DIAGNOSIS — F41.9 ANXIETY: Chronic | ICD-10-CM

## 2023-01-26 DIAGNOSIS — F51.01 PRIMARY INSOMNIA: ICD-10-CM

## 2023-01-26 DIAGNOSIS — Z78.0 MENOPAUSE: ICD-10-CM

## 2023-01-26 DIAGNOSIS — M15.9 PRIMARY OSTEOARTHRITIS INVOLVING MULTIPLE JOINTS: ICD-10-CM

## 2023-01-26 DIAGNOSIS — E78.2 MIXED HYPERLIPIDEMIA: ICD-10-CM

## 2023-01-26 DIAGNOSIS — G56.03 BILATERAL CARPAL TUNNEL SYNDROME: ICD-10-CM

## 2023-01-26 DIAGNOSIS — M76.32 ILIOTIBIAL BAND SYNDROME OF LEFT SIDE: ICD-10-CM

## 2023-01-26 DIAGNOSIS — M47.812 SPONDYLOSIS OF CERVICAL REGION WITHOUT MYELOPATHY OR RADICULOPATHY: ICD-10-CM

## 2023-01-26 DIAGNOSIS — B00.1 RECURRENT COLD SORES: ICD-10-CM

## 2023-01-26 DIAGNOSIS — G25.0 BENIGN FAMILIAL TREMOR: ICD-10-CM

## 2023-01-26 PROBLEM — J01.90 ACUTE NON-RECURRENT SINUSITIS: Status: RESOLVED | Noted: 2018-03-05 | Resolved: 2023-01-26

## 2023-01-26 PROBLEM — N95.0 POSTMENOPAUSAL BLEEDING: Status: RESOLVED | Noted: 2017-05-16 | Resolved: 2023-01-26

## 2023-01-26 RX ORDER — ALPRAZOLAM 0.25 MG/1
0.25 TABLET ORAL
Qty: 30 TABLET | Refills: 0 | Status: SHIPPED | OUTPATIENT
Start: 2023-01-26

## 2023-01-26 RX ORDER — ORPHENADRINE CITRATE 100 MG/1
100 TABLET, EXTENDED RELEASE ORAL DAILY PRN
Qty: 30 TABLET | Refills: 5 | Status: SHIPPED | OUTPATIENT
Start: 2023-01-26

## 2023-01-26 NOTE — ASSESSMENT & PLAN NOTE
Patient does have recurrent cold sores  Has used Valtrex when they occur  Doing relatively well with this  Has not needed recently

## 2023-01-26 NOTE — PROGRESS NOTES
Name: Romaine Hua      : 1965      MRN: [id number]  Encounter Provider: Porsche Escobedo MD  Encounter Date: 2023   Encounter department: [place] [address]     1  Gout, unspecified cause, unspecified chronicity, unspecified site  Assessment & Plan:  Patient may have had several episodes of gout  She had swelling and irritation of the right first toe  Check uric acid, CBC  Follow-up afterwards  Limit shellfish somewhat  Try to avoid beer  Orders:  -     Uric acid; Future  -     CBC and differential; Future  -     Comprehensive metabolic panel; Future; Expected date: 2023    2  Mixed hyperlipidemia  Assessment & Plan:  Last labs were done 2022 with LDL of 138  Takes fish oil tablets daily  Orders:  -     Comprehensive metabolic panel; Future; Expected date: 2023  -     Lipid Panel with Direct LDL reflex; Future; Expected date: 2023    3  Spondylosis of cervical region without myelopathy or radiculopathy  Assessment & Plan:  Patient does have some arthritis symptoms in the C-spine, as well as multiple other areas  No change at the moment  4  Primary osteoarthritis involving multiple joints  Assessment & Plan:  Stable on tramadol and gabapentin  Orders:  -     orphenadrine (NORFLEX) 100 mg tablet; Take 1 tablet (100 mg total) by mouth daily as needed for muscle spasms    5  BRENDA (obstructive sleep apnea)  Assessment & Plan:  Doesn't notice it  Denies needing treatment  6  Benign familial tremor  Assessment & Plan:  Stable, has not sees Neuro for a long time  Orders:  -     TSH, 3rd generation; Future; Expected date: 2023    7  Bilateral carpal tunnel syndrome  Assessment & Plan:  Has not had any problems for awhile  8  Iliotibial band syndrome of left side  Assessment & Plan:  IT band syndrome on the left  Managed with stretches given to her by PT        9  Arthritis of left acromioclavicular joint  Assessment & Plan:  Intermittent pain  Managed with heat/ice or stretching  10  Menopause  Assessment & Plan:  LMP over 6 years ago  Sees GYN       11  Anxiety  Assessment & Plan:  Says the anxiety is slightly worse than before, but is still manageable with the Xanax  Increase stress with raising a teenager  Orders:  -     ALPRAZolam (XANAX) 0 25 mg tablet; Take 1 tablet (0 25 mg total) by mouth daily at bedtime as needed for anxiety    12  Primary insomnia  Assessment & Plan:  Stable on combo melatonin, Belsamra, Benadryl  Switches medications every few days  Orders:  -     CBC and differential; Future  -     TSH, 3rd generation; Future; Expected date: 01/26/2023    13  Recurrent cold sores  Assessment & Plan:  Patient does have recurrent cold sores  Has used Valtrex when they occur  Doing relatively well with this  Has not needed recently  Subjective      Patient is a 66 y/o female coming in to establish care  States she thinks that she has had gout attacks  One was in left wrist was July 2022, left big toe was August 2022, and right wrist was sometime in the Fall  Pain happened out of the blue and lasted for a few days  Took Uric Acid Control and something else that paired with it and pain was gone the next day  Describes it as sharp and throbbing  Her wrist didn't swell but the toe was significantly swollen and red  Said she could barely walk and anything that touched it hurt  Has never had blood work for uric acid levels  Says diet consists of meat, fruits/vegetables, and sometimes processed foods  Tries to stay away from carbs for weight management and doesn't eat seafood  Reviewed chart  Review of Systems   Constitutional: Negative  HENT: Negative  Eyes: Negative  Respiratory: Negative  Cardiovascular: Negative  Gastrointestinal: Negative  Endocrine: Negative  Genitourinary: Negative      Musculoskeletal: Positive for arthralgias ( Arthritis in the shoulder sometimes acts up)  Skin: Negative  Allergic/Immunologic: Negative  Neurological: Negative  Hematological: Negative  Psychiatric/Behavioral: Positive for sleep disturbance  The patient is nervous/anxious  Current Outpatient Medications on File Prior to Visit   Medication Sig   • ascorbic acid (VITAMIN C) 500 mg tablet Take 500 mg by mouth daily    • Calcium-Vitamin D (CALTRATE 600 PLUS-VIT D PO) Take 600 mg by mouth daily    • clindamycin (CLEOCIN T) 1 % external solution apply topically to affected area AS NEEDED FOR FLARES OF THE SCALP   • estradiol (VAGIFEM, YUVAFEM) 10 MCG TABS vaginal tablet Insert 1 tablet (10 mcg total) into the vagina 2 (two) times a week   • gabapentin (NEURONTIN) 100 mg capsule Take 1 capsule (100 mg total) by mouth 3 (three) times a day   • naproxen (NAPROSYN) 500 mg tablet Take 1 tablet (500 mg total) by mouth 2 (two) times a day with meals (Patient taking differently: Take 500 mg by mouth as needed)   • Suvorexant (Belsomra) 15 MG TABS Take 1 tablet (15 mg total) by mouth daily at bedtime   • traMADol (ULTRAM) 50 mg tablet Take 1 tablet (50 mg total) by mouth every 6 (six) hours as needed for severe pain   • TURMERIC PO Take 1 capsule by mouth daily    • valACYclovir (VALTREX) 1,000 mg tablet TAKE 1 TABLET BY MOUTH DAILY AS NEEDED FOR ORAL LESION FOR UP TO 4 DAYS   • zinc gluconate 50 mg tablet Take 50 mg by mouth daily     • [DISCONTINUED] ALPRAZolam (XANAX) 0 25 mg tablet Take 1 tablet (0 25 mg total) by mouth daily at bedtime as needed for anxiety   • [DISCONTINUED] orphenadrine (NORFLEX) 100 mg tablet Take 1 tablet (100 mg total) by mouth daily as needed for muscle spasms   • [DISCONTINUED] albuterol (Ventolin HFA) 90 mcg/act inhaler Inhale 2 puffs every 4 (four) hours as needed for wheezing (Patient not taking: Reported on 11/25/2022)   • [DISCONTINUED] colchicine (COLCRYS) 0 6 mg tablet Take 1 tablet 3 times daily as needed for acute gout (Patient not taking: Reported on 11/25/2022)   • [DISCONTINUED] fluticasone (FLONASE) 50 mcg/act nasal spray 2 sprays into each nostril as needed in the morning for rhinitis  Objective     /64 (BP Location: Right arm, Patient Position: Sitting, Cuff Size: Standard)   Temp 98 1 °F (36 7 °C) (Temporal)   Ht 5' 4 5" (1 638 m)   Wt 64 7 kg (142 lb 9 6 oz)   BMI 24 10 kg/m²     Physical Exam  Vitals and nursing note reviewed  Constitutional:       Appearance: Normal appearance  She is well-developed, well-groomed and normal weight  HENT:      Head: Normocephalic and atraumatic  Cardiovascular:      Rate and Rhythm: Normal rate and regular rhythm  Pulses: Normal pulses  Heart sounds: Normal heart sounds  Pulmonary:      Effort: Pulmonary effort is normal       Breath sounds: Normal breath sounds  Musculoskeletal:         General: Normal range of motion  Skin:     General: Skin is warm and dry  Neurological:      General: No focal deficit present  Mental Status: She is alert and oriented to person, place, and time  Psychiatric:         Mood and Affect: Mood normal          Behavior: Behavior normal  Behavior is cooperative  Thought Content:  Thought content normal          Judgment: Judgment normal        Yobany Lamar MD

## 2023-01-26 NOTE — PATIENT INSTRUCTIONS
1  Gout, unspecified cause, unspecified chronicity, unspecified site  Assessment & Plan:  Patient may have had several episodes of gout  She had swelling and irritation of the right first toe  Check uric acid, CBC  Follow-up afterwards  Limit shellfish somewhat  Try to avoid beer  Orders:  -     Uric acid; Future  -     CBC and differential; Future  -     Comprehensive metabolic panel; Future; Expected date: 01/26/2023    2  Mixed hyperlipidemia  Assessment & Plan:  Last labs were done 5/2022 with LDL of 138  Takes fish oil tablets daily  Orders:  -     Comprehensive metabolic panel; Future; Expected date: 01/26/2023  -     Lipid Panel with Direct LDL reflex; Future; Expected date: 01/26/2023    3  Spondylosis of cervical region without myelopathy or radiculopathy  Assessment & Plan:  Patient does have some arthritis symptoms in the C-spine, as well as multiple other areas  No change at the moment  4  Primary osteoarthritis involving multiple joints  Assessment & Plan:  Stable on tramadol and gabapentin  Orders:  -     orphenadrine (NORFLEX) 100 mg tablet; Take 1 tablet (100 mg total) by mouth daily as needed for muscle spasms    5  BRENDA (obstructive sleep apnea)  Assessment & Plan:  Doesn't notice it  Denies needing treatment  6  Benign familial tremor  Assessment & Plan:  Stable, has not sees Neuro for a long time  Orders:  -     TSH, 3rd generation; Future; Expected date: 01/26/2023    7  Bilateral carpal tunnel syndrome  Assessment & Plan:  Has not had any problems for awhile  8  Iliotibial band syndrome of left side  Assessment & Plan:  IT band syndrome on the left  Managed with stretches given to her by PT  9  Arthritis of left acromioclavicular joint  Assessment & Plan:  Intermittent pain  Managed with heat/ice or stretching  10  Menopause  Assessment & Plan:  LMP over 6 years ago   Sees GYN       11  Anxiety  Assessment & Plan:  Says the anxiety is slightly worse than before, but is still manageable with the Xanax  Increase stress with raising a teenager  Orders:  -     ALPRAZolam (XANAX) 0 25 mg tablet; Take 1 tablet (0 25 mg total) by mouth daily at bedtime as needed for anxiety    12  Primary insomnia  Assessment & Plan:  Stable on combo melatonin, Belsamra, Benadryl  Switches medications every few days  Orders:  -     CBC and differential; Future  -     TSH, 3rd generation; Future; Expected date: 01/26/2023    13  Recurrent cold sores  Assessment & Plan:  Patient does have recurrent cold sores  Has used Valtrex when they occur  Doing relatively well with this  Has not needed recently  COVID 19 Instructions    Maria Alejandra Coon was advised to limit contact with others to essential tasks such as getting food, medications, and medical care  Proper handwashing reviewed, and Hand sanitzer when washing is not available  If the patient develops symptoms of COVID 19, the patient should call the office as soon as possible  For [id number] Flu season, it is strongly recommended that Flu Vaccinations be obtained  Virtual Visits:  Noah: This works on smart phones (any phone with Internet browsing capability)  You should get a text message when the provider is ready to see you  Click on the link in the text message, and the call should start  You will need to type in your name, and allow camera and microphone access  This is HIPPA compliant, and secure  If you have not already done so, get immunized to COVID 19  We are committed to getting you vaccinated as soon as possible and will be closely following CDC and [facility]  guidelines as they are released and revised  Please refer to our COVID-19 vaccine webpage for the most up to date information on the vaccine and our distribution efforts      This site will also have the most up to date recommendations for COVID booster elvis Sorto tn    Call [phone] ([phone]), option 7    OUR NEW LOCATION:    [address], [address], [place], [address]  Fax: [phone]    Lab services and OB/GYN are at this location as well

## 2023-01-26 NOTE — ASSESSMENT & PLAN NOTE
Says the anxiety is slightly worse than before, but is still manageable with the Xanax  Increase stress with raising a teenager

## 2023-01-26 NOTE — ASSESSMENT & PLAN NOTE
Patient does have some arthritis symptoms in the C-spine, as well as multiple other areas  No change at the moment

## 2023-01-26 NOTE — ASSESSMENT & PLAN NOTE
Patient may have had several episodes of gout  She had swelling and irritation of the right first toe  Check uric acid, CBC  Follow-up afterwards  Limit shellfish somewhat  Try to avoid beer

## 2023-01-26 NOTE — PROGRESS NOTES
Name: Tatianna Cruz      : 1965      MRN: [id number]  Encounter Provider: Ramon Varma MD  Encounter Date: 2023   Encounter department: [place] [address]     1  Gout, unspecified cause, unspecified chronicity, unspecified site  Assessment & Plan:  Patient may have had several episodes of gout  She had swelling and irritation of the right first toe  Check uric acid, CBC  Follow-up afterwards  Limit shellfish somewhat  Try to avoid beer  Orders:  -     Uric acid; Future  -     CBC and differential; Future  -     Comprehensive metabolic panel; Future; Expected date: 2023    2  Mixed hyperlipidemia  Assessment & Plan:  Last labs were done 2022 with LDL of 138  Takes fish oil tablets daily  Orders:  -     Comprehensive metabolic panel; Future; Expected date: 2023  -     Lipid Panel with Direct LDL reflex; Future; Expected date: 2023    3  Spondylosis of cervical region without myelopathy or radiculopathy  Assessment & Plan:  Patient does have some arthritis symptoms in the C-spine, as well as multiple other areas  No change at the moment  4  Primary osteoarthritis involving multiple joints  Assessment & Plan:  Stable on tramadol and gabapentin  Orders:  -     orphenadrine (NORFLEX) 100 mg tablet; Take 1 tablet (100 mg total) by mouth daily as needed for muscle spasms    5  BRENDA (obstructive sleep apnea)  Assessment & Plan:  Doesn't notice it  Denies needing treatment  6  Benign familial tremor  Assessment & Plan:  Stable, has not sees Neuro for a long time  Orders:  -     TSH, 3rd generation; Future; Expected date: 2023    7  Bilateral carpal tunnel syndrome  Assessment & Plan:  Has not had any problems for awhile  8  Iliotibial band syndrome of left side  Assessment & Plan:  IT band syndrome on the left  Managed with stretches given to her by PT        9  Arthritis of left acromioclavicular joint  Assessment & Plan:  Intermittent pain  Managed with heat/ice or stretching  10  Menopause  Assessment & Plan:  LMP over 6 years ago  Sees GYN       11  Anxiety  Assessment & Plan:  Says the anxiety is slightly worse than before, but is still manageable with the Xanax  Increase stress with raising a teenager  Orders:  -     ALPRAZolam (XANAX) 0 25 mg tablet; Take 1 tablet (0 25 mg total) by mouth daily at bedtime as needed for anxiety    12  Primary insomnia  Assessment & Plan:  Stable on combo melatonin, Belsamra, Benadryl  Switches medications every few days  Orders:  -     CBC and differential; Future  -     TSH, 3rd generation; Future; Expected date: 01/26/2023    13  Recurrent cold sores  Assessment & Plan:  Patient does have recurrent cold sores  Has used Valtrex when they occur  Doing relatively well with this  Has not needed recently               Subjective      HPI  Review of Systems    Current Outpatient Medications on File Prior to Visit   Medication Sig   • ascorbic acid (VITAMIN C) 500 mg tablet Take 500 mg by mouth daily    • Calcium-Vitamin D (CALTRATE 600 PLUS-VIT D PO) Take 600 mg by mouth daily    • clindamycin (CLEOCIN T) 1 % external solution apply topically to affected area AS NEEDED FOR FLARES OF THE SCALP   • estradiol (VAGIFEM, YUVAFEM) 10 MCG TABS vaginal tablet Insert 1 tablet (10 mcg total) into the vagina 2 (two) times a week   • gabapentin (NEURONTIN) 100 mg capsule Take 1 capsule (100 mg total) by mouth 3 (three) times a day   • naproxen (NAPROSYN) 500 mg tablet Take 1 tablet (500 mg total) by mouth 2 (two) times a day with meals (Patient taking differently: Take 500 mg by mouth as needed)   • Suvorexant (Belsomra) 15 MG TABS Take 1 tablet (15 mg total) by mouth daily at bedtime   • traMADol (ULTRAM) 50 mg tablet Take 1 tablet (50 mg total) by mouth every 6 (six) hours as needed for severe pain   • TURMERIC PO Take 1 capsule by mouth daily    • valACYclovir (VALTREX) 1,000 mg tablet TAKE 1 TABLET BY MOUTH DAILY AS NEEDED FOR ORAL LESION FOR UP TO 4 DAYS   • zinc gluconate 50 mg tablet Take 50 mg by mouth daily  • [DISCONTINUED] ALPRAZolam (XANAX) 0 25 mg tablet Take 1 tablet (0 25 mg total) by mouth daily at bedtime as needed for anxiety   • [DISCONTINUED] orphenadrine (NORFLEX) 100 mg tablet Take 1 tablet (100 mg total) by mouth daily as needed for muscle spasms   • [DISCONTINUED] albuterol (Ventolin HFA) 90 mcg/act inhaler Inhale 2 puffs every 4 (four) hours as needed for wheezing (Patient not taking: Reported on 11/25/2022)   • [DISCONTINUED] colchicine (COLCRYS) 0 6 mg tablet Take 1 tablet 3 times daily as needed for acute gout (Patient not taking: Reported on 11/25/2022)   • [DISCONTINUED] fluticasone (FLONASE) 50 mcg/act nasal spray 2 sprays into each nostril as needed in the morning for rhinitis         Objective     /64 (BP Location: Right arm, Patient Position: Sitting, Cuff Size: Standard)   Temp 98 1 °F (36 7 °C) (Temporal)   Ht 5' 4 5" (1 638 m)   Wt 64 7 kg (142 lb 9 6 oz)   BMI 24 10 kg/m²     Physical Exam  Sharla Pollock MD

## 2023-02-20 DIAGNOSIS — G47.00 INSOMNIA, UNSPECIFIED TYPE: ICD-10-CM

## 2023-02-20 RX ORDER — SUVOREXANT 15 MG/1
1 TABLET, FILM COATED ORAL
Qty: 90 TABLET | Refills: 0 | Status: SHIPPED | OUTPATIENT
Start: 2023-02-20 | End: 2023-03-03 | Stop reason: SDUPTHER

## 2023-03-03 DIAGNOSIS — G47.00 INSOMNIA, UNSPECIFIED TYPE: ICD-10-CM

## 2023-03-03 RX ORDER — SUVOREXANT 15 MG/1
1 TABLET, FILM COATED ORAL
Qty: 90 TABLET | Refills: 0 | Status: SHIPPED | OUTPATIENT
Start: 2023-03-03

## 2023-03-09 ENCOUNTER — OFFICE VISIT (OUTPATIENT)
Dept: FAMILY MEDICINE CLINIC | Facility: CLINIC | Age: 58
End: 2023-03-09

## 2023-03-09 VITALS
BODY MASS INDEX: 24.75 KG/M2 | DIASTOLIC BLOOD PRESSURE: 62 MMHG | SYSTOLIC BLOOD PRESSURE: 122 MMHG | HEIGHT: 64 IN | WEIGHT: 145 LBS | HEART RATE: 60 BPM

## 2023-03-09 DIAGNOSIS — Z02.89 ENCOUNTER FOR FEDERAL AVIATION ADMINISTRATION (FAA) EXAMINATION: Primary | ICD-10-CM

## 2023-05-05 ENCOUNTER — APPOINTMENT (OUTPATIENT)
Dept: RADIOLOGY | Facility: MEDICAL CENTER | Age: 58
End: 2023-05-05

## 2023-05-05 ENCOUNTER — OFFICE VISIT (OUTPATIENT)
Dept: OBGYN CLINIC | Facility: MEDICAL CENTER | Age: 58
End: 2023-05-05

## 2023-05-05 VITALS
HEIGHT: 64 IN | BODY MASS INDEX: 25.23 KG/M2 | SYSTOLIC BLOOD PRESSURE: 146 MMHG | HEART RATE: 44 BPM | WEIGHT: 147.8 LBS | DIASTOLIC BLOOD PRESSURE: 87 MMHG

## 2023-05-05 DIAGNOSIS — M25.511 RIGHT SHOULDER PAIN, UNSPECIFIED CHRONICITY: ICD-10-CM

## 2023-05-05 DIAGNOSIS — G47.00 INSOMNIA, UNSPECIFIED TYPE: ICD-10-CM

## 2023-05-05 DIAGNOSIS — M19.011 GLENOHUMERAL ARTHRITIS, RIGHT: ICD-10-CM

## 2023-05-05 DIAGNOSIS — M25.511 RIGHT SHOULDER PAIN, UNSPECIFIED CHRONICITY: Primary | ICD-10-CM

## 2023-05-05 RX ORDER — BUPIVACAINE HYDROCHLORIDE 2.5 MG/ML
4 INJECTION, SOLUTION INFILTRATION; PERINEURAL
Status: COMPLETED | OUTPATIENT
Start: 2023-05-05 | End: 2023-05-05

## 2023-05-05 RX ORDER — METHYLPREDNISOLONE ACETATE 40 MG/ML
1 INJECTION, SUSPENSION INTRA-ARTICULAR; INTRALESIONAL; INTRAMUSCULAR; SOFT TISSUE
Status: COMPLETED | OUTPATIENT
Start: 2023-05-05 | End: 2023-05-05

## 2023-05-05 RX ADMIN — BUPIVACAINE HYDROCHLORIDE 4 ML: 2.5 INJECTION, SOLUTION INFILTRATION; PERINEURAL at 08:49

## 2023-05-05 RX ADMIN — METHYLPREDNISOLONE ACETATE 1 ML: 40 INJECTION, SUSPENSION INTRA-ARTICULAR; INTRALESIONAL; INTRAMUSCULAR; SOFT TISSUE at 08:55

## 2023-05-05 NOTE — PROGRESS NOTES
"Orthopaedic Surgery - Office Note  Jl Parisi (61 y o  female)   : 1965   MRN: [id number]  Encounter Date: 2023    Chief Complaint   Patient presents with    Right Shoulder - Follow-up       Assessment / Plan  Right glenohumeral arthritis    · CSI of right glenohumeral joint was performed  Return if symptoms worsen or fail to improve  History of Present Illness  Maria Alejandra Villalba is a 65 y o  female who presents for follow up of right glenohumeral arthritis  Patient was last seen 6/15/21 at which time she had CSI  She reports the injection was very beneficial and lasted 6 months or longer  She notes she does do HEP daily, normally in the shower with the hot water  Patient states her shoulder is burning again for the past several months  She states it is worse at night while trying to sleep  Maria Alejandra would like another injection today  Review of Systems  A comprehensive review of systems was negative except for:  MSK: positive for arthralgias and stiff joints    Physical Exam  /87   Pulse (!) 44   Ht 5' 4\" (1 626 m)   Wt 67 kg (147 lb 12 8 oz)   BMI 25 37 kg/m²   Cons: Appears well  No apparent distress  Psych: Alert  Oriented x3  Mood and affect normal   Eyes: PERRLA, EOMI  Resp: Normal effort  No audible wheezing or stridor  CV: Palpable pulse  No discernable arrhythmia  No LE edema  Lymph:  No palpable cervical, axillary, or inguinal lymphadenopathy  Skin: Warm  No palpable masses  No visible lesions  Neuro: Normal muscle tone  Normal and symmetric DTR's  Right Shoulder Exam  Alignment / Posture:  Normal cervical alignment  Normal shoulder posture  No scapular dyskinesis or winging  Inspection:  No swelling  No edema  No erythema  No ecchymosis  No muscle atrophy  No deformity  Palpation:  No effusion  No warmth  No crepitus  ROM:  Normal shoulder ROM  Strength:  5/5 supraspinatus, infraspinatus, and subscapularis  Rotator cuff 5/5  Stability:  Not tested    Tests: No " "pertinent positive or negative tests  Neurovascular:  Sensation intact in Ax/R/M/U nerve distributions  2+ radial pulse  150 FF  60 ER  Rotator 5/5  L4  Subscap   ER 5/5  Studies Reviewed  XR of right shoulder - moderate glenohumeral osteoarthritis    Large joint arthrocentesis: R glenohumeral  [place] Protocol:  Consent: Verbal consent obtained  Risks and benefits: risks, benefits and alternatives were discussed  Consent given by: patient  Time out: Immediately prior to procedure a \"time out\" was called to verify the correct patient, procedure, equipment, support staff and site/side marked as required  Timeout called at: 5/5/2023 8:46 AM   Patient understanding: patient states understanding of the procedure being performed  Site marked: the operative site was marked  Patient identity confirmed: verbally with patient    Supporting Documentation  Indications: pain   Procedure Details  Location: shoulder - R glenohumeral  Needle size: 22 G  Ultrasound guidance: no  Medications administered: 4 mL bupivacaine 0 25 %; 1 mL methylPREDNISolone acetate 40 mg/mL    Patient tolerance: patient tolerated the procedure well with no immediate complications  Dressing:  Sterile dressing applied        Medical, Surgical, Family, and Social History  The patient's medical history, family history, and social history, were reviewed and updated as appropriate      Past Medical History:   Diagnosis Date    Acid reflux     Allergic reaction to dye     Resolved - [id number]    Aneurysm of thoracic aorta (HCC)     last assess 2017    Anxiety     Arthritis     Atrophic vaginitis     Benign familial tremor     Last assessed - [id number]    Cervical back pain with evidence of disc disease     Chronic otitis media of right ear     Last assessed - 29Jan2015    Contact dermatitis     Last assessed - [id number]    Dermatitis     Disturbance of smell     Last assessed - 29Apr2013    DJD (degenerative joint disease)     Endometriosis  " Last assessed - [id number]    Esophageal reflux     Last assessed - 31Iuh8739    Extremity pain     RUE    Folliculitis     Last assessed - 24Ulh9099    Headache     Herpes simplex     High cholesterol     Hyperlipidemia     Last assessed - 54ZQS9486    Lateral epicondylitis of left elbow     Last assessed - 23ATQ0183    Lumbar back pain     Myofascial pain syndrome     Neoplasm of skin     Last assessed - 61Ezw1546    Osteoarthritis     PONV (postoperative nausea and vomiting)     severe    Postmenopausal atrophic vaginitis     Last assessed - [id number]    Postmenopausal bleeding 5/16/2017    Prophylactic antibiotic     Last assessed - [id number]    Rotator cuff disorder     Sleep apnea     mild no cpap    Taste impairment     taste disturbances - Last assessed - 96Szq6876    Tremor, hereditary, benign     Wound, open, finger     Last assessed - [id number]       Past Surgical History:   Procedure Laterality Date    COLONOSCOPY      DIAGNOSTIC LAPAROSCOPY      LAPAROSCOPIC ENDOMETRIOSIS FULGURATION      Laparoscop excis endometriotic tissue uterosacral ligaments    KS ARTHRP ACETBLR/PROX FEM PROSTC AGRFT/ALGRFT Left 5/16/2016    Procedure: ANTERIOR TOTAL HIP ARTHROPLASTY ;  Surgeon: Akira Aguirre MD;  Location: [facility];  Service: Orthopedics    KS COLONOSCOPY FLX DX W/FRANCESCA Bhardwaj 1978 PFRMD N/A 12/6/2018    Procedure: COLONOSCOPY;  Surgeon: Sindy Benitez DO;  Location: [facility]; Service: Gastroenterology    KS ESOPHAGOGASTRODUODENOSCOPY TRANSORAL DIAGNOSTIC N/A 12/6/2018    Procedure: ESOPHAGOGASTRODUODENOSCOPY (EGD); Surgeon: Sindy Benitez DO;  Location: [facility];   Service: Gastroenterology    REVISION TOTAL HIP ARTHROPLASTY  06/15/2011    TOTAL HIP ARTHROPLASTY Right     TOTAL HIP ARTHROPLASTY  10/20/2010    UPPER GASTROINTESTINAL ENDOSCOPY         Family History   Problem Relation Age of Onset    Hypertension Mother     Stroke Mother     Atrial fibrillation Mother     Diverticulitis Mother         of colon    Esophageal cancer Father 80    Arthritis Father     Stroke Father         Stroke syndrome    Arthritis Brother     Other Brother         Esophageal reflux    No Known Problems Sister     No Known Problems Maternal Grandmother     No Known Problems Maternal Grandfather     Colon cancer Paternal Grandmother 66    No Known Problems Paternal Grandfather     No Known Problems Sister     No Known Problems Maternal Aunt     Breast cancer Maternal Aunt 48    Breast cancer Other         age unknown    Breast cancer Other         unknown age   [de-identified] Prostate cancer Paternal Uncle [de-identified]    Breast cancer Maternal Uncle 72    No Known Problems Brother     No Known Problems Brother        Social History     Occupational History    Occupation: Medical Professional   Tobacco Use    Smoking status: Never    Smokeless tobacco: Never   Vaping Use    Vaping Use: Never used   Substance and Sexual Activity    Alcohol use: Yes     Comment: rare    Drug use: No     Comment: Denied history of drug use    Sexual activity: Yes     Partners: Male     Birth control/protection: None, Post-menopausal       Allergies   Allergen Reactions    Iodinated Contrast Media Swelling and Eye Swelling     IVP dye specifically; swollen eye lid shut    Morphine Vomiting     Reaction Date: [id number];     Morphine And Related      DENIES ALLERGY RELATES N/V TO ANESTHESIA    Vicodin [Hydrocodone-Acetaminophen]      No allergy to tylenol   Does have N/V to vicoden    Effexor [Venlafaxine] Arthralgia    Gentamicin Rash     Reaction Date: 48TQS1147;          Current Outpatient Medications:     ALPRAZolam (XANAX) 0 25 mg tablet, Take 1 tablet (0 25 mg total) by mouth daily at bedtime as needed for anxiety, Disp: 30 tablet, Rfl: 0    ascorbic acid (VITAMIN C) 500 mg tablet, Take 500 mg by mouth daily , Disp: , Rfl:     Calcium-Vitamin D (CALTRATE 600 PLUS-VIT D PO), Take 600 mg by mouth daily , Disp: , Rfl:     clindamycin (CLEOCIN T) 1 % external solution, apply topically to affected area AS NEEDED FOR FLARES OF THE SCALP, Disp: , Rfl: 0    estradiol (VAGIFEM, YUVAFEM) 10 MCG TABS vaginal tablet, Insert 1 tablet (10 mcg total) into the vagina 2 (two) times a week, Disp: 28 tablet, Rfl: 3    naproxen (NAPROSYN) 500 mg tablet, Take 1 tablet (500 mg total) by mouth 2 (two) times a day with meals (Patient taking differently: Take 500 mg by mouth as needed), Disp: 60 tablet, Rfl: 1    orphenadrine (NORFLEX) 100 mg tablet, Take 1 tablet (100 mg total) by mouth daily as needed for muscle spasms, Disp: 30 tablet, Rfl: 5    Suvorexant (Belsomra) 15 MG TABS, Take 1 tablet (15 mg total) by mouth daily at bedtime, Disp: 90 tablet, Rfl: 0    TURMERIC PO, Take 1 capsule by mouth daily , Disp: , Rfl:     zinc gluconate 50 mg tablet, Take 50 mg by mouth daily  , Disp: , Rfl:     gabapentin (NEURONTIN) 100 mg capsule, Take 1 capsule (100 mg total) by mouth 3 (three) times a day (Patient not taking: Reported on 5/5/2023), Disp: 90 capsule, Rfl: 1    traMADol (ULTRAM) 50 mg tablet, Take 1 tablet (50 mg total) by mouth every 6 (six) hours as needed for severe pain (Patient not taking: Reported on 5/5/2023), Disp: 20 tablet, Rfl: 0    valACYclovir (VALTREX) 1,000 mg tablet, TAKE 1 TABLET BY MOUTH DAILY AS NEEDED FOR ORAL LESION FOR UP TO 4 DAYS, Disp: 4 tablet, Rfl: 5    Scribe Attestation    I,:  Yin Marie am acting as a scribe while in the presence of the attending physician :       I,:  Domenico Birmingham MD personally performed the services described in this documentation    as scribed in my presence :

## 2023-05-08 RX ORDER — SUVOREXANT 15 MG/1
1 TABLET, FILM COATED ORAL
Qty: 90 TABLET | Refills: 0 | Status: SHIPPED | OUTPATIENT
Start: 2023-05-08

## 2023-05-11 DIAGNOSIS — G47.33 OSA (OBSTRUCTIVE SLEEP APNEA): ICD-10-CM

## 2023-05-11 DIAGNOSIS — E55.9 VITAMIN D DEFICIENCY: ICD-10-CM

## 2023-05-11 DIAGNOSIS — E78.2 MIXED HYPERLIPIDEMIA: Primary | ICD-10-CM

## 2023-05-11 DIAGNOSIS — R53.83 FATIGUE, UNSPECIFIED TYPE: ICD-10-CM

## 2023-05-11 DIAGNOSIS — M10.9 GOUT, UNSPECIFIED CAUSE, UNSPECIFIED CHRONICITY, UNSPECIFIED SITE: ICD-10-CM

## 2023-05-11 DIAGNOSIS — G47.00 INSOMNIA, UNSPECIFIED TYPE: ICD-10-CM

## 2023-05-24 ENCOUNTER — OFFICE VISIT (OUTPATIENT)
Dept: GASTROENTEROLOGY | Facility: CLINIC | Age: 58
End: 2023-05-24

## 2023-05-24 VITALS
HEIGHT: 64 IN | DIASTOLIC BLOOD PRESSURE: 60 MMHG | SYSTOLIC BLOOD PRESSURE: 112 MMHG | BODY MASS INDEX: 25.44 KG/M2 | TEMPERATURE: 97.6 F | WEIGHT: 149 LBS

## 2023-05-24 DIAGNOSIS — Z12.11 COLON CANCER SCREENING: ICD-10-CM

## 2023-05-24 DIAGNOSIS — K21.9 GASTROESOPHAGEAL REFLUX DISEASE WITHOUT ESOPHAGITIS: ICD-10-CM

## 2023-05-24 DIAGNOSIS — R13.19 ESOPHAGEAL DYSPHAGIA: Primary | ICD-10-CM

## 2023-05-24 DIAGNOSIS — Z11.59 NEED FOR HEPATITIS B SCREENING TEST: ICD-10-CM

## 2023-05-24 NOTE — LETTER
May 24, 2023     Karina Garcia MD  [address]  [address] [place] [id number]    Patient: Edward Xie   YOB: 1965   Date of Visit: 5/24/2023       Dear Dr Miladys Larkin: Thank you for referring Raisa Soto to me for evaluation  Below are my notes for this consultation  If you have questions, please do not hesitate to call me  I look forward to following your patient along with you  Sincerely,        Lane Motta DO        CC: No Recipients    Lane Smith DO  5/24/2023  8:33 AM  Incomplete  [facility] - Outpatient Consultation  Amy Bennett Runner 65 y o  female MRN: [id number]  Encounter: [id number]          ASSESSMENT AND PLAN: 60-year-old female prior patient of mine from 2018  1  Esophageal dysphagia  -This symptom is new and is an alarm symptom, fortunately she does not have weight loss but rather weight gain, this could be due to Schatzki's ring, eosinophilic esophagitis, other  -We will plan EGD with biopsies  - EGD; Future    2  Gastroesophageal reflux disease without esophagitis  -On prior EGDs it has been nonerosive in nature, can continue as needed PPI currently or can try Pepcid instead  -Discussed that the only real shown long-term side effect is a small but increased risk of an enteric infection when looking at the meta-analysis of PPIs    3  Need for hepatitis B screening test  -She was hep C negative, will check for hepatitis B based on new guidelines  - Hepatitis B surface antibody; Future  - Hepatitis B surface antigen; Future  - Hepatitis B core antibody, total; Future    4  Colon cancer screening  -She is up-to-date, last colonoscopy in 2018, recommend 10-year follow-up      ____________________________________________________    HPI:  65year old female self-referred  Is a prior patient of mine    EGD in 2018 without erosive esophagitis seen has had biopsies of stomach, duodenum, GE junction in the past  Colonoscopy in  and   Dad had esophageal cancer, paternal grandmother had colon cancer  Dad was a smoker and had chronic reflux and  at 80   Non smoker, occasional rare alcohol    Pt reports chronic heartburn started in her 29's and has been on occasional PPI's for many years  Symptoms include nausea and epigastric discomfort, does not get acid taste  Doesn't get worse after any foods  Newer symptoms of some discomfort in the epigastrium, feels like she is getting riddle faster, is actually gaining weight  A few months ago had an episode where she moved her legs above her chest in a challenge and had severe pain  REVIEW OF SYSTEMS:    CONSTITUTIONAL: Positive for weight gain   HEENT: No earache or tinnitus  Denies hearing loss or visual disturbances  CARDIOVASCULAR: No chest pain or palpitations  RESPIRATORY: Denies any cough, hemoptysis, shortness of breath or dyspnea on exertion  GASTROINTESTINAL: As noted in the History of Present Illness  GENITOURINARY: No problems with urination  Denies any hematuria or dysuria  NEUROLOGIC: No dizziness or vertigo, denies headaches  MUSCULOSKELETAL: Denies any muscle or joint pain  SKIN: Denies skin rashes or itching  ENDOCRINE: Denies excessive thirst  Denies intolerance to heat or cold  PSYCHOSOCIAL: Denies depression or anxiety  Denies any recent memory loss       Historical Information   Past Medical History:   Diagnosis Date   • Acid reflux    • Allergic reaction to dye     Resolved - [id number]   • Aneurysm of thoracic aorta (HCC)     last assess    • Anxiety    • Arthritis    • Atrophic vaginitis    • Benign familial tremor     Last assessed - 2013   • Cervical back pain with evidence of disc disease    • Chronic otitis media of right ear     Last assessed - 2015   • Contact dermatitis     Last assessed - 22NFY4396   • Dermatitis    • Disturbance of smell     Last assessed - 2013   • DJD (degenerative joint disease)    • Endometriosis     Last assessed - 36ONT0848   • Esophageal reflux     Last assessed - 40Mmt2968   • Extremity pain     RUE   • Folliculitis     Last assessed - 00Lfk7308   • Headache    • Herpes simplex    • High cholesterol    • Hyperlipidemia     Last assessed - 27HBK7801   • Lateral epicondylitis of left elbow     Last assessed - 32VUL5620   • Lumbar back pain    • Myofascial pain syndrome    • Neoplasm of skin     Last assessed - 82Epr7349   • Osteoarthritis    • PONV (postoperative nausea and vomiting)     severe   • Postmenopausal atrophic vaginitis     Last assessed - 70ODK8265   • Postmenopausal bleeding 5/16/2017   • Prophylactic antibiotic     Last assessed - 71AOI9647   • Rotator cuff disorder    • Sleep apnea     mild no cpap   • Taste impairment     taste disturbances - Last assessed - 29Apr2013   • Tremor, hereditary, benign    • Wound, open, finger     Last assessed - 40INT8647     Past Surgical History:   Procedure Laterality Date   • COLONOSCOPY     • DIAGNOSTIC LAPAROSCOPY     • LAPAROSCOPIC ENDOMETRIOSIS FULGURATION      Laparoscop excis endometriotic tissue uterosacral ligaments   • LA ARTHRP ACETBLR/PROX FEM PROSTC AGRFT/ALGRFT Left 5/16/2016    Procedure: ANTERIOR TOTAL HIP ARTHROPLASTY ;  Surgeon: Yosvany Barbosa MD;  [facility];  Service: Orthopedics   • LA COLONOSCOPY FLX DX W/COLLJ SPEC WHEN PFRMD N/A 12/6/2018    Procedure: COLONOSCOPY;  Surgeon: John Galvan DO;  Location: [facility]; Service: Gastroenterology   • LA ESOPHAGOGASTRODUODENOSCOPY TRANSORAL DIAGNOSTIC N/A 12/6/2018    Procedure: ESOPHAGOGASTRODUODENOSCOPY (EGD); Surgeon: John Galvan DO;  Location: [facility];   Service: Gastroenterology   • REVISION TOTAL HIP ARTHROPLASTY  06/15/2011   • TOTAL HIP ARTHROPLASTY Right    • TOTAL HIP ARTHROPLASTY  10/20/2010   • UPPER GASTROINTESTINAL ENDOSCOPY       Social History   Social History     Substance and Sexual Activity   Alcohol Use Yes    Comment: rare Social History     Substance and Sexual Activity   Drug Use No    Comment: Denied history of drug use     Social History     Tobacco Use   Smoking Status Never   Smokeless Tobacco Never     Family History   Problem Relation Age of Onset   • Hypertension Mother    • Stroke Mother    • Atrial fibrillation Mother    • Diverticulitis Mother         of colon   • Esophageal cancer Father 80   • Arthritis Father    • Stroke Father         Stroke syndrome   • Arthritis Brother    • Other Brother         Esophageal reflux   • No Known Problems Sister    • No Known Problems Maternal Grandmother    • No Known Problems Maternal Grandfather    • Colon cancer Paternal Grandmother 66   • No Known Problems Paternal Grandfather    • No Known Problems Sister    • No Known Problems Maternal Aunt    • Breast cancer Maternal Aunt 48   • Breast cancer Other         age unknown   • Breast cancer Other         unknown age   • Prostate cancer Paternal Uncle [de-identified]   • Breast cancer Maternal Uncle 72   • No Known Problems Brother    • No Known Problems Brother      Meds/Allergies       Current Outpatient Medications:   •  ALPRAZolam (XANAX) 0 25 mg tablet  •  ascorbic acid (VITAMIN C) 500 mg tablet  •  Calcium-Vitamin D (CALTRATE 600 PLUS-VIT D PO)  •  clindamycin (CLEOCIN T) 1 % external solution  •  estradiol (VAGIFEM, YUVAFEM) 10 MCG TABS vaginal tablet  •  orphenadrine (NORFLEX) 100 mg tablet  •  Suvorexant (Belsomra) 15 MG TABS  •  TURMERIC PO  •  zinc gluconate 50 mg tablet  •  gabapentin (NEURONTIN) 100 mg capsule  •  naproxen (NAPROSYN) 500 mg tablet  •  traMADol (ULTRAM) 50 mg tablet  •  valACYclovir (VALTREX) 1,000 mg tablet    Allergies   Allergen Reactions   • Iodinated Contrast Media Swelling and Eye Swelling     IVP dye specifically; swollen eye lid shut   • Morphine Vomiting     Reaction Date: [id number];    • Morphine And Related      DENIES ALLERGY RELATES N/V TO ANESTHESIA   • Vicodin [Hydrocodone-Acetaminophen]      No "allergy to tylenol   Does have N/V to vicoden   • Effexor [Venlafaxine] Arthralgia   • Gentamicin Rash     Reaction Date: 36IUY4666;      Objective     Blood pressure 112/60, temperature 97 6 °F (36 4 °C), temperature source Tympanic, height 5' 4\" (1 626 m), weight 67 6 kg (149 lb)  Body mass index is 25 58 kg/m²  PHYSICAL EXAM:      General Appearance:   Alert, cooperative, no distress   HEENT:   Normocephalic, atraumatic, anicteric  Neck:  Supple, symmetrical, trachea midline   Lungs:   Clear to auscultation bilaterally; no rales, rhonchi or wheezing; respirations unlabored    Heart[de-identified]   Regular rate and rhythm; no murmur, rub, or gallop  Abdomen:   Soft, non-tender, non-distended; normal bowel sounds; no masses, no organomegaly    Genitalia:   Deferred    Rectal:   Deferred    Extremities:  No cyanosis, clubbing or edema    Pulses:  2+ and symmetric    Skin:  No jaundice, rashes, or lesions    Lymph nodes:  No palpable cervical lymphadenopathy        Lab Results:   No visits with results within 1 Day(s) from this visit     Latest known visit with results is:   Annual Exam on 06/23/2022   Component Date Value   • Case Report 06/23/2022                      Value:Gynecologic Cytology Report                       Case: [id number]                                  Authorizing Provider:  Janina Klinefelter, DO      Collected:           06/23/2022 1035              Ordering Location:     [facility]:            06/23/2022 1035                                     [facility]                                                    First Screen:          Rui Paul, CT                                                       Rescreen:              LUISA Piña                                                    Specimen:    LIQUID-BASED PAP, SCREENING, Cervix                                                       • Primary Interpretation 06/23/2022 Negative for " intraepithelial lesion or malignancy    • Specimen Adequacy 2022 Satisfactory for evaluation  Endocervical/transformation zone component present  • Additional Information 2022                      Value: This result contains rich text formatting which cannot be displayed here  Jade Smith DO  2023  8:25 AM  Sign when Signing Visit  [facility] - Outpatient Consultation  Maria Alejandra Whelan [place] 65 y o  female MRN: [id number]  Encounter: [id number]          ASSESSMENT AND PLAN:      ____________________________________________________    HPI:  65year old  EGD in 2018  Colonoscopy in  and 2018  Dad had esophageal cancer, paternal grandmother had colon cancer  Dad was a smoker and had chronic reflux and  at 80   Non smoker, occasional rare alcohol    Pt reports chronic heartburn started in her 29's and has been on occasional PPI's for many years  Symptoms include nausea and epigastric discomfort, does not get acid taste  Doesn't get worse after any foods  Newer symptoms of some discomfort in the epigastrium, feels like she is getting riddle faster, is actually gaining weight  A few months ago had an episode where she moved her legs above her chest in a challenge and had severe pain  REVIEW OF SYSTEMS:    CONSTITUTIONAL: Denies any fever, chills, rigors, and weight loss  HEENT: No earache or tinnitus  Denies hearing loss or visual disturbances  CARDIOVASCULAR: No chest pain or palpitations  RESPIRATORY: Denies any cough, hemoptysis, shortness of breath or dyspnea on exertion  GASTROINTESTINAL: As noted in the History of Present Illness  GENITOURINARY: No problems with urination  Denies any hematuria or dysuria  NEUROLOGIC: No dizziness or vertigo, denies headaches  MUSCULOSKELETAL: Denies any muscle or joint pain  SKIN: Denies skin rashes or itching     ENDOCRINE: Denies excessive thirst  Denies intolerance to heat or cold   PSYCHOSOCIAL: Denies depression or anxiety  Denies any recent memory loss         Historical Information   Past Medical History:   Diagnosis Date   • Acid reflux    • Allergic reaction to dye     Resolved - 13SYZ6220   • Aneurysm of thoracic aorta (HCC)     last assess 2017   • Anxiety    • Arthritis    • Atrophic vaginitis    • Benign familial tremor     Last assessed - 23Dec2013   • Cervical back pain with evidence of disc disease    • Chronic otitis media of right ear     Last assessed - 29Jan2015   • Contact dermatitis     Last assessed - 29CLO7470   • Dermatitis    • Disturbance of smell     Last assessed - 29Apr2013   • DJD (degenerative joint disease)    • Endometriosis     Last assessed - 18OZI5119   • Esophageal reflux     Last assessed - 67UMA7440   • Extremity pain     RUE   • Folliculitis     Last assessed - 24Apr2013   • Headache    • Herpes simplex    • High cholesterol    • Hyperlipidemia     Last assessed - 18LXR0254   • Lateral epicondylitis of left elbow     Last assessed - 83KHS3153   • Lumbar back pain    • Myofascial pain syndrome    • Neoplasm of skin     Last assessed - 28Aug2013   • Osteoarthritis    • PONV (postoperative nausea and vomiting)     severe   • Postmenopausal atrophic vaginitis     Last assessed - 02YDN0338   • Postmenopausal bleeding 5/16/2017   • Prophylactic antibiotic     Last assessed - 86JSB4168   • Rotator cuff disorder    • Sleep apnea     mild no cpap   • Taste impairment     taste disturbances - Last assessed - 29Apr2013   • Tremor, hereditary, benign    • Wound, open, finger     Last assessed - 51YRI8800     Past Surgical History:   Procedure Laterality Date   • COLONOSCOPY     • DIAGNOSTIC LAPAROSCOPY     • LAPAROSCOPIC ENDOMETRIOSIS FULGURATION      Laparoscop excis endometriotic tissue uterosacral ligaments   • VA ARTHRP ACETBLR/PROX FEM PROSTC AGRFT/ALGRFT Left 5/16/2016    Procedure: ANTERIOR TOTAL HIP ARTHROPLASTY ;  Surgeon: Erlinda Arrieta MD;  [facility];  Service: Orthopedics   • NC COLONOSCOPY FLX DX W/COLLJ SPEC WHEN PFRMD N/A 12/6/2018    Procedure: COLONOSCOPY;  Surgeon: Mt Dominguez DO;  Location: [facility]; Service: Gastroenterology   • NC ESOPHAGOGASTRODUODENOSCOPY TRANSORAL DIAGNOSTIC N/A 12/6/2018    Procedure: ESOPHAGOGASTRODUODENOSCOPY (EGD); Surgeon: Mt Dominguez DO;  Location: [facility];   Service: Gastroenterology   • REVISION TOTAL HIP ARTHROPLASTY  06/15/2011   • TOTAL HIP ARTHROPLASTY Right    • TOTAL HIP ARTHROPLASTY  10/20/2010   • UPPER GASTROINTESTINAL ENDOSCOPY       Social History   Social History     Substance and Sexual Activity   Alcohol Use Yes    Comment: rare     Social History     Substance and Sexual Activity   Drug Use No    Comment: Denied history of drug use     Social History     Tobacco Use   Smoking Status Never   Smokeless Tobacco Never     Family History   Problem Relation Age of Onset   • Hypertension Mother    • Stroke Mother    • Atrial fibrillation Mother    • Diverticulitis Mother         of colon   • Esophageal cancer Father 80   • Arthritis Father    • Stroke Father         Stroke syndrome   • Arthritis Brother    • Other Brother         Esophageal reflux   • No Known Problems Sister    • No Known Problems Maternal Grandmother    • No Known Problems Maternal Grandfather    • Colon cancer Paternal Grandmother 66   • No Known Problems Paternal Grandfather    • No Known Problems Sister    • No Known Problems Maternal Aunt    • Breast cancer Maternal Aunt 48   • Breast cancer Other         age unknown   • Breast cancer Other         unknown age   • Prostate cancer Paternal Uncle [de-identified]   • Breast cancer Maternal Uncle 72   • No Known Problems Brother    • No Known Problems Brother      Meds/Allergies       Current Outpatient Medications:   •  ALPRAZolam (XANAX) 0 25 mg tablet  •  ascorbic acid (VITAMIN C) 500 mg tablet  •  Calcium-Vitamin D (CALTRATE 600 PLUS-VIT D PO)  •  clindamycin (CLEOCIN T) 1 "% external solution  •  estradiol (VAGIFEM, YUVAFEM) 10 MCG TABS vaginal tablet  •  orphenadrine (NORFLEX) 100 mg tablet  •  Suvorexant (Belsomra) 15 MG TABS  •  TURMERIC PO  •  zinc gluconate 50 mg tablet  •  gabapentin (NEURONTIN) 100 mg capsule  •  naproxen (NAPROSYN) 500 mg tablet  •  traMADol (ULTRAM) 50 mg tablet  •  valACYclovir (VALTREX) 1,000 mg tablet    Allergies   Allergen Reactions   • Iodinated Contrast Media Swelling and Eye Swelling     IVP dye specifically; swollen eye lid shut   • Morphine Vomiting     Reaction Date: [id number];    • Morphine And Related      DENIES ALLERGY RELATES N/V TO ANESTHESIA   • Vicodin [Hydrocodone-Acetaminophen]      No allergy to tylenol   Does have N/V to vicoden   • Effexor [Venlafaxine] Arthralgia   • Gentamicin Rash     Reaction Date: [id number];      Objective     Blood pressure 112/60, temperature 97 6 °F (36 4 °C), temperature source Tympanic, height 5' 4\" (1 626 m), weight 67 6 kg (149 lb)  Body mass index is 25 58 kg/m²  PHYSICAL EXAM:      General Appearance:   Alert, cooperative, no distress   HEENT:   Normocephalic, atraumatic, anicteric  Neck:  Supple, symmetrical, trachea midline   Lungs:   Clear to auscultation bilaterally; no rales, rhonchi or wheezing; respirations unlabored    Heart[de-identified]   Regular rate and rhythm; no murmur, rub, or gallop  Abdomen:   Soft, non-tender, non-distended; normal bowel sounds; no masses, no organomegaly    Genitalia:   Deferred    Rectal:   Deferred    Extremities:  No cyanosis, clubbing or edema    Pulses:  2+ and symmetric    Skin:  No jaundice, rashes, or lesions    Lymph nodes:  No palpable cervical lymphadenopathy        Lab Results:   No visits with results within 1 Day(s) from this visit     Latest known visit with results is:   Annual Exam on 06/23/2022   Component Date Value   • Case Report 06/23/2022                      Value:Gynecologic Cytology Report                       Case: [id number]                      " Authorizing Provider:  Namita Geiger DO      Collected:           06/23/2022 1035              Ordering Location:     [address] [place] [address] For        Received:            06/23/2022 1035                                     [facility]                                                    First Screen:          Leopoldo Ming, CT                                                       Rescreen:              LUISA Zamorano                                                    Specimen:    LIQUID-BASED PAP, SCREENING, Cervix                                                       • Primary Interpretation 06/23/2022 Negative for intraepithelial lesion or malignancy    • Specimen Adequacy 06/23/2022 Satisfactory for evaluation  Endocervical/transformation zone component present  • Additional Information 06/23/2022                      Value: This result contains rich text formatting which cannot be displayed here  Radiology Results:   XR shoulder 2+ vw right    Result Date: 5/9/2023  Narrative: RIGHT SHOULDER INDICATION:   M25 511: Pain in right shoulder  COMPARISON:  None VIEWS:  XR SHOULDER 2+ VW RIGHT Images: 9 FINDINGS: There is no acute fracture or dislocation  Mild osteoarthritis of the glenohumeral and acromioclavicular joints  No lytic or blastic osseous lesion  Soft tissues are unremarkable  Impression: No acute osseous abnormality  Degenerative changes as described   Workstation performed: [id number]

## 2023-05-24 NOTE — PATIENT INSTRUCTIONS
Scheduled date of EGD(as of today):08 01 23  Physician performing EGD:DR BAHENA  Location of EGD:[facility]  Instructions reviewed with patient by:LUCITA  Clearances:  N/A

## 2023-05-24 NOTE — PROGRESS NOTES
[facility] - Outpatient Consultation  Maria Alejandra Murdock 65 y o  female MRN: [id number]  Encounter: [id number]          ASSESSMENT AND PLAN: 81-year-old female prior patient of mine from 2018  1  Esophageal dysphagia  -This symptom is new and is an alarm symptom, fortunately she does not have weight loss but rather weight gain, this could be due to Schatzki's ring, eosinophilic esophagitis, other  -We will plan EGD with biopsies  - EGD; Future    2  Gastroesophageal reflux disease without esophagitis  -On prior EGDs it has been nonerosive in nature, can continue as needed PPI currently or can try Pepcid instead  -Discussed that the only real shown long-term side effect is a small but increased risk of an enteric infection when looking at the meta-analysis of PPIs    3  Need for hepatitis B screening test  -She was hep C negative, will check for hepatitis B based on new guidelines  - Hepatitis B surface antibody; Future  - Hepatitis B surface antigen; Future  - Hepatitis B core antibody, total; Future    4  Colon cancer screening  -She is up-to-date, last colonoscopy in 2018, recommend 10-year follow-up      ____________________________________________________    HPI:  65year old female self-referred  Is a prior patient of mine  EGD in 2018 without erosive esophagitis seen has had biopsies of stomach, duodenum, GE junction in the past  Colonoscopy in  and 2018  Dad had esophageal cancer, paternal grandmother had colon cancer  Dad was a smoker and had chronic reflux and  at 80   Non smoker, occasional rare alcohol    Pt reports chronic heartburn started in her 29's and has been on occasional PPI's for many years  Symptoms include nausea and epigastric discomfort, does not get acid taste  Doesn't get worse after any foods  Newer symptoms of some discomfort in the epigastrium, feels like she is getting riddle faster, is actually gaining weight        A few months ago had an episode where she moved her legs above her chest in a challenge and had severe pain  REVIEW OF SYSTEMS:    CONSTITUTIONAL: Positive for weight gain   HEENT: No earache or tinnitus  Denies hearing loss or visual disturbances  CARDIOVASCULAR: No chest pain or palpitations  RESPIRATORY: Denies any cough, hemoptysis, shortness of breath or dyspnea on exertion  GASTROINTESTINAL: As noted in the History of Present Illness  GENITOURINARY: No problems with urination  Denies any hematuria or dysuria  NEUROLOGIC: No dizziness or vertigo, denies headaches  MUSCULOSKELETAL: Denies any muscle or joint pain  SKIN: Denies skin rashes or itching  ENDOCRINE: Denies excessive thirst  Denies intolerance to heat or cold  PSYCHOSOCIAL: Denies depression or anxiety  Denies any recent memory loss       Historical Information   Past Medical History:   Diagnosis Date   • Acid reflux    • Allergic reaction to dye     Resolved - [id number]   • Aneurysm of thoracic aorta (HCC)     last assess 2017   • Anxiety    • Arthritis    • Atrophic vaginitis    • Benign familial tremor     Last assessed - 23Dec2013   • Cervical back pain with evidence of disc disease    • Chronic otitis media of right ear     Last assessed - 29Jan2015   • Contact dermatitis     Last assessed - [id number]   • Dermatitis    • Disturbance of smell     Last assessed - 29Apr2013   • DJD (degenerative joint disease)    • Endometriosis     Last assessed - 58GFH9366   • Esophageal reflux     Last assessed - 35TIZ4149   • Extremity pain     RUE   • Folliculitis     Last assessed - 24Apr2013   • Headache    • Herpes simplex    • High cholesterol    • Hyperlipidemia     Last assessed - 95BWX1176   • Lateral epicondylitis of left elbow     Last assessed - 41YOC7793   • Lumbar back pain    • Myofascial pain syndrome    • Neoplasm of skin     Last assessed - 10Wxx8933   • Osteoarthritis    • PONV (postoperative nausea and vomiting)     severe   • Postmenopausal atrophic vaginitis     Last assessed - 06RYJ7673   • Postmenopausal bleeding 5/16/2017   • Prophylactic antibiotic     Last assessed - 95AWM6074   • Rotator cuff disorder    • Sleep apnea     mild no cpap   • Taste impairment     taste disturbances - Last assessed - 37Duq2945   • Tremor, hereditary, benign    • Wound, open, finger     Last assessed - 41HOW8306     Past Surgical History:   Procedure Laterality Date   • COLONOSCOPY     • DIAGNOSTIC LAPAROSCOPY     • LAPAROSCOPIC ENDOMETRIOSIS FULGURATION      Laparoscop excis endometriotic tissue uterosacral ligaments   • [place] ARTHRP ACETBLR/PROX FEM PROSTC AGRFT/ALGRFT Left 5/16/2016    Procedure: ANTERIOR TOTAL HIP ARTHROPLASTY ;  Surgeon: Donal Cornejo MD;  Location: [facility];  Service: Orthopedics   • [place] COLONOSCOPY FLX DX W/COLLJ [place] 1978 PFRMD N/A 12/6/2018    Procedure: COLONOSCOPY;  Surgeon: Sheeba Triana DO;  Location: [facility] GI LAB; Service: Gastroenterology   • KS ESOPHAGOGASTRODUODENOSCOPY TRANSORAL DIAGNOSTIC N/A 12/6/2018    Procedure: ESOPHAGOGASTRODUODENOSCOPY (EGD); Surgeon: Sheeba Triana DO;  Location: [facility] GI LAB;   Service: Gastroenterology   • REVISION TOTAL HIP ARTHROPLASTY  06/15/2011   • TOTAL HIP ARTHROPLASTY Right    • TOTAL HIP ARTHROPLASTY  10/20/2010   • UPPER GASTROINTESTINAL ENDOSCOPY       Social History   Social History     Substance and Sexual Activity   Alcohol Use Yes    Comment: rare     Social History     Substance and Sexual Activity   Drug Use No    Comment: Denied history of drug use     Social History     Tobacco Use   Smoking Status Never   Smokeless Tobacco Never     Family History   Problem Relation Age of Onset   • Hypertension Mother    • Stroke Mother    • Atrial fibrillation Mother    • Diverticulitis Mother         of colon   • Esophageal cancer Father 80   • Arthritis Father    • Stroke Father         Stroke syndrome   • Arthritis Brother    • Other Brother         Esophageal reflux   • No Known Problems Sister "  • No Known Problems Maternal Grandmother    • No Known Problems Maternal Grandfather    • Colon cancer Paternal Grandmother 66   • No Known Problems Paternal Grandfather    • No Known Problems Sister    • No Known Problems Maternal Aunt    • Breast cancer Maternal Aunt 48   • Breast cancer Other         age unknown   • Breast cancer Other         unknown age   • Prostate cancer Paternal Uncle [de-identified]   • Breast cancer Maternal Uncle 72   • No Known Problems Brother    • No Known Problems Brother      Meds/Allergies       Current Outpatient Medications:   •  ALPRAZolam (XANAX) 0 25 mg tablet  •  ascorbic acid (VITAMIN C) 500 mg tablet  •  Calcium-Vitamin D (CALTRATE 600 PLUS-VIT D PO)  •  clindamycin (CLEOCIN T) 1 % external solution  •  estradiol (VAGIFEM, YUVAFEM) 10 MCG TABS vaginal tablet  •  orphenadrine (NORFLEX) 100 mg tablet  •  Suvorexant (Belsomra) 15 MG TABS  •  TURMERIC PO  •  zinc gluconate 50 mg tablet  •  gabapentin (NEURONTIN) 100 mg capsule  •  naproxen (NAPROSYN) 500 mg tablet  •  traMADol (ULTRAM) 50 mg tablet  •  valACYclovir (VALTREX) 1,000 mg tablet    Allergies   Allergen Reactions   • Iodinated Contrast Media Swelling and Eye Swelling     IVP dye specifically; swollen eye lid shut   • Morphine Vomiting     Reaction Date: [id number];    • Morphine And Related      DENIES ALLERGY RELATES N/V TO ANESTHESIA   • Vicodin [Hydrocodone-Acetaminophen]      No allergy to tylenol   Does have N/V to vicoden   • Effexor [Venlafaxine] Arthralgia   • Gentamicin Rash     Reaction Date: [id number];      Objective     Blood pressure 112/60, temperature 97 6 °F (36 4 °C), temperature source Tympanic, height 5' 4\" (1 626 m), weight 67 6 kg (149 lb)  Body mass index is 25 58 kg/m²      PHYSICAL EXAM:      General Appearance:   Alert, cooperative, no distress   HEENT:   Normocephalic, atraumatic, anicteric      Neck:  Supple, symmetrical, trachea midline   Lungs:   Clear to auscultation bilaterally; no rales, rhonchi or " wheezing; respirations unlabored    Heart[de-identified]   Regular rate and rhythm; no murmur, rub, or gallop  Abdomen:   Soft, non-tender, non-distended; normal bowel sounds; no masses, no organomegaly    Genitalia:   Deferred    Rectal:   Deferred    Extremities:  No cyanosis, clubbing or edema    Pulses:  2+ and symmetric    Skin:  No jaundice, rashes, or lesions    Lymph nodes:  No palpable cervical lymphadenopathy        Lab Results:   No visits with results within 1 Day(s) from this visit  Latest known visit with results is:   Annual Exam on 06/23/2022   Component Date Value   • Case Report 06/23/2022                      Value:Gynecologic Cytology Report                       Case: [id number]                                  Authorizing Provider:  Corrinne Glasser, DO      Collected:           06/23/2022 1035              Ordering Location:     [address] [place] [address] For        Received:            06/23/2022 1035                                     [facility]                                                    First Screen:          [place], [place]                                                       Rescreen:              LUISA Levy                                                    Specimen:    LIQUID-BASED PAP, SCREENING, Cervix                                                       • Primary Interpretation 06/23/2022 Negative for intraepithelial lesion or malignancy    • Specimen Adequacy 06/23/2022 Satisfactory for evaluation  Endocervical/transformation zone component present  • Additional Information 06/23/2022                      Value: This result contains rich text formatting which cannot be displayed here

## 2023-06-01 DIAGNOSIS — R21 FACIAL RASH: Primary | ICD-10-CM

## 2023-06-01 NOTE — PROGRESS NOTES
Had rash across face  Was concerned could be Lupus  OK for FIDE test with next labs  1  Facial rash  -     FIDE Screen w/ Reflex to Titer/Pattern;  Future yes

## 2023-07-05 DIAGNOSIS — G47.00 INSOMNIA, UNSPECIFIED TYPE: ICD-10-CM

## 2023-07-05 DIAGNOSIS — F41.9 ANXIETY: Chronic | ICD-10-CM

## 2023-07-05 RX ORDER — ALPRAZOLAM 0.25 MG/1
0.25 TABLET ORAL
Qty: 30 TABLET | Refills: 0 | Status: SHIPPED | OUTPATIENT
Start: 2023-07-05

## 2023-07-05 RX ORDER — SUVOREXANT 15 MG/1
1 TABLET, FILM COATED ORAL
Qty: 90 TABLET | Refills: 0 | Status: SHIPPED | OUTPATIENT
Start: 2023-07-05

## 2023-07-26 DIAGNOSIS — Z82.69 FAMILY HISTORY OF SYSTEMIC LUPUS ERYTHEMATOSUS: Primary | ICD-10-CM

## 2023-07-26 DIAGNOSIS — R53.82 CHRONIC FATIGUE: ICD-10-CM

## 2023-08-01 ENCOUNTER — ANESTHESIA EVENT (OUTPATIENT)
Dept: ANESTHESIOLOGY | Facility: HOSPITAL | Age: 58
End: 2023-08-01

## 2023-08-01 ENCOUNTER — HOSPITAL ENCOUNTER (OUTPATIENT)
Dept: GASTROENTEROLOGY | Facility: AMBULARY SURGERY CENTER | Age: 58
Setting detail: OUTPATIENT SURGERY
Discharge: HOME | End: 2023-08-01
Attending: INTERNAL MEDICINE
Payer: COMMERCIAL

## 2023-08-01 ENCOUNTER — ANESTHESIA (OUTPATIENT)
Dept: GASTROENTEROLOGY | Facility: AMBULARY SURGERY CENTER | Age: 58
End: 2023-08-01

## 2023-08-01 ENCOUNTER — ANESTHESIA EVENT (OUTPATIENT)
Dept: GASTROENTEROLOGY | Facility: AMBULARY SURGERY CENTER | Age: 58
End: 2023-08-01

## 2023-08-01 ENCOUNTER — ANESTHESIA (OUTPATIENT)
Dept: ANESTHESIOLOGY | Facility: HOSPITAL | Age: 58
End: 2023-08-01

## 2023-08-01 VITALS
BODY MASS INDEX: 24.59 KG/M2 | TEMPERATURE: 96.9 F | RESPIRATION RATE: 20 BRPM | HEIGHT: 64 IN | HEART RATE: 68 BPM | SYSTOLIC BLOOD PRESSURE: 102 MMHG | DIASTOLIC BLOOD PRESSURE: 45 MMHG | WEIGHT: 144 LBS | OXYGEN SATURATION: 99 %

## 2023-08-01 DIAGNOSIS — R13.19 ESOPHAGEAL DYSPHAGIA: ICD-10-CM

## 2023-08-01 PROCEDURE — 43239 EGD BIOPSY SINGLE/MULTIPLE: CPT | Performed by: INTERNAL MEDICINE

## 2023-08-01 PROCEDURE — 88313 SPECIAL STAINS GROUP 2: CPT | Performed by: PATHOLOGY

## 2023-08-01 PROCEDURE — 88305 TISSUE EXAM BY PATHOLOGIST: CPT | Performed by: PATHOLOGY

## 2023-08-01 RX ORDER — SODIUM CHLORIDE, SODIUM LACTATE, POTASSIUM CHLORIDE, CALCIUM CHLORIDE 600; 310; 30; 20 MG/100ML; MG/100ML; MG/100ML; MG/100ML
INJECTION, SOLUTION INTRAVENOUS CONTINUOUS PRN
Status: DISCONTINUED | OUTPATIENT
Start: 2023-08-01 | End: 2023-08-01

## 2023-08-01 RX ORDER — PROPOFOL 10 MG/ML
INJECTION, EMULSION INTRAVENOUS AS NEEDED
Status: DISCONTINUED | OUTPATIENT
Start: 2023-08-01 | End: 2023-08-01

## 2023-08-01 RX ORDER — LIDOCAINE HYDROCHLORIDE 20 MG/ML
INJECTION, SOLUTION EPIDURAL; INFILTRATION; INTRACAUDAL; PERINEURAL AS NEEDED
Status: DISCONTINUED | OUTPATIENT
Start: 2023-08-01 | End: 2023-08-01

## 2023-08-01 RX ORDER — EPHEDRINE SULFATE 50 MG/ML
INJECTION INTRAVENOUS AS NEEDED
Status: DISCONTINUED | OUTPATIENT
Start: 2023-08-01 | End: 2023-08-01

## 2023-08-01 RX ADMIN — PROPOFOL 50 MG: 10 INJECTION, EMULSION INTRAVENOUS at 11:30

## 2023-08-01 RX ADMIN — LIDOCAINE HYDROCHLORIDE 100 MG: 20 INJECTION, SOLUTION EPIDURAL; INFILTRATION; INTRACAUDAL; PERINEURAL at 11:27

## 2023-08-01 RX ADMIN — SODIUM CHLORIDE, SODIUM LACTATE, POTASSIUM CHLORIDE, AND CALCIUM CHLORIDE: .6; .31; .03; .02 INJECTION, SOLUTION INTRAVENOUS at 11:21

## 2023-08-01 RX ADMIN — EPHEDRINE SULFATE 10 MG: 50 INJECTION INTRAVENOUS at 11:36

## 2023-08-01 RX ADMIN — PROPOFOL 200 MG: 10 INJECTION, EMULSION INTRAVENOUS at 11:27

## 2023-08-01 RX ADMIN — EPHEDRINE SULFATE 10 MG: 50 INJECTION INTRAVENOUS at 11:33

## 2023-08-01 NOTE — ANESTHESIA POSTPROCEDURE EVALUATION
Post-Op Assessment Note    CV Status:  Stable  Pain Score: 0    Pain management: adequate     Mental Status:  Arousable and sleepy   Hydration Status:  Stable   PONV Controlled:  Controlled   Airway Patency:  Patent      Post Op Vitals Reviewed: Yes      Staff: CRNA         No notable events documented.     BP   120/60   Temp 97.6   Pulse 51   Resp 14   SpO2 99

## 2023-08-01 NOTE — ANESTHESIA PREPROCEDURE EVALUATION
Procedure:  EGD    Relevant Problems   CARDIO   (+) Chronic midline thoracic back pain   (+) Hyperlipidemia   (+) Pelvic congestion syndrome      MUSCULOSKELETAL   (+) Acromioclavicular joint arthritis   (+) Chronic lower back pain   (+) Chronic midline thoracic back pain   (+) DJD (degenerative joint disease)   (+) DJD (degenerative joint disease), cervical   (+) Glenohumeral arthritis, right   (+) Gout   (+) Lumbar degenerative disc disease      NEURO/PSYCH   (+) Anxiety   (+) Chronic lower back pain   (+) Chronic midline thoracic back pain   (+) Episodic tension-type headache, not intractable   (+) Pelvic congestion syndrome      PULMONARY   (+) BRENDA (obstructive sleep apnea)      GERD      Physical Exam    Airway    Mallampati score: II  TM Distance: >3 FB  Neck ROM: full     Dental   No notable dental hx     Cardiovascular  Cardiovascular exam normal    Pulmonary  Pulmonary exam normal     Other Findings        Anesthesia Plan  ASA Score- 2     Anesthesia Type- IV sedation with anesthesia with ASA Monitors. Additional Monitors:   Airway Plan:           Plan Factors-Exercise tolerance (METS): >4 METS. Chart reviewed. EKG reviewed. Imaging results reviewed. Existing labs reviewed. Patient summary reviewed. Patient is not a current smoker. Patient not instructed to abstain from smoking on day of procedure. Patient did not smoke on day of surgery. Obstructive sleep apnea risk education given perioperatively. Induction- intravenous. Postoperative Plan-     Informed Consent- Anesthetic plan and risks discussed with patient. I personally reviewed this patient with the CRNA. Discussed and agreed on the Anesthesia Plan with the CRNA. Juanito Velez
no

## 2023-08-01 NOTE — H&P
History and Physical -  [facility]  Maria Alejandra Dominique Leeann 65 y.o. female MRN: [id number]                  HPI: Juan Manuel Ortiz is a 65y.o. year old female who presents for EGD due to dysphagia. REVIEW OF SYSTEMS: Per the HPI, and otherwise unremarkable.     Historical Information   Past Medical History:   Diagnosis Date   • Acid reflux    • Allergic reaction to dye     Resolved - [id number]   • Aneurysm of thoracic aorta (HCC)     last assess 2017   • Anxiety    • Arthritis    • Atrophic vaginitis    • Benign familial tremor     Last assessed - 23Dec2013   • Cervical back pain with evidence of disc disease    • Chronic otitis media of right ear     Last assessed - 29Jan2015   • Contact dermatitis     Last assessed - 51WIK5989   • Dermatitis    • Disturbance of smell     Last assessed - 29Apr2013   • DJD (degenerative joint disease)    • Endometriosis     Last assessed - 72DLU1738   • Esophageal reflux     Last assessed - 75GDF9441   • Extremity pain     RUE   • Folliculitis     Last assessed - 24Apr2013   • Headache    • Herpes simplex    • High cholesterol    • Hyperlipidemia     Last assessed - 67OUS4653   • Lateral epicondylitis of left elbow     Last assessed - 95BUJ6792   • Lumbar back pain    • Myofascial pain syndrome    • Neoplasm of skin     Last assessed - 28Aug2013   • Osteoarthritis    • PONV (postoperative nausea and vomiting)     severe   • Postmenopausal atrophic vaginitis     Last assessed - 79LVN5880   • Postmenopausal bleeding 5/16/2017   • Prophylactic antibiotic     Last assessed - 28VEV6663   • Rotator cuff disorder    • Sleep apnea     mild no cpap   • Taste impairment     taste disturbances - Last assessed - 29Apr2013   • Tremor, hereditary, benign    • Wound, open, finger     Last assessed - 85BHO0750     Past Surgical History:   Procedure Laterality Date   • COLONOSCOPY     • DIAGNOSTIC LAPAROSCOPY     • LAPAROSCOPIC ENDOMETRIOSIS FULGURATION      Laparoscop excis endometriotic tissue uterosacral ligaments   • UT ARTHRP ACETBLR/PROX FEM PROSTC AGRFT/ALGRFT Left 5/16/2016    Procedure: ANTERIOR TOTAL HIP ARTHROPLASTY ;  Surgeon: Brian Marcelino MD;  Location: [facility];  Service: Orthopedics   • UT COLONOSCOPY FLX DX W/COLLJ SPEC WHEN PFRMD N/A 12/6/2018    Procedure: COLONOSCOPY;  Surgeon: Bang West DO;  Location: [facility] GI LAB; Service: Gastroenterology   • UT ESOPHAGOGASTRODUODENOSCOPY TRANSORAL DIAGNOSTIC N/A 12/6/2018    Procedure: ESOPHAGOGASTRODUODENOSCOPY (EGD); Surgeon: Bang West DO;  Location: [facility] GI LAB;   Service: Gastroenterology   • REVISION TOTAL HIP ARTHROPLASTY  06/15/2011   • TOTAL HIP ARTHROPLASTY Right    • TOTAL HIP ARTHROPLASTY  10/20/2010   • UPPER GASTROINTESTINAL ENDOSCOPY       Social History   Social History     Substance and Sexual Activity   Alcohol Use Yes    Comment: rare     Social History     Substance and Sexual Activity   Drug Use No    Comment: Denied history of drug use     Social History     Tobacco Use   Smoking Status Never   Smokeless Tobacco Never     Family History   Problem Relation Age of Onset   • Hypertension Mother    • Stroke Mother    • Atrial fibrillation Mother    • Diverticulitis Mother         of colon   • Esophageal cancer Father 80   • Arthritis Father    • Stroke Father         Stroke syndrome   • No Known Problems Sister    • No Known Problems Sister    • Arthritis Brother    • Other Brother         Esophageal reflux   • No Known Problems Brother    • No Known Problems Brother    • No Known Problems Maternal Grandmother    • No Known Problems Maternal Grandfather    • Cancer Paternal Grandmother    • Colon cancer Paternal Grandmother 66   • No Known Problems Paternal Grandfather    • No Known Problems Maternal Aunt    • Breast cancer Maternal Aunt 48   • Breast cancer Maternal Uncle 72   • Prostate cancer Paternal Uncle 80   • Breast cancer Other         age unknown   • Breast cancer Other         unknown age Meds/Allergies       Current Outpatient Medications:   •  ALPRAZolam (XANAX) 0.25 mg tablet  •  ascorbic acid (VITAMIN C) 500 mg tablet  •  Calcium-Vitamin D (CALTRATE 600 PLUS-VIT D PO)  •  estradiol (VAGIFEM, YUVAFEM) 10 MCG TABS vaginal tablet  •  Suvorexant (Belsomra) 15 MG TABS  •  TURMERIC PO  •  valACYclovir (VALTREX) 1,000 mg tablet  •  zinc gluconate 50 mg tablet  •  clindamycin (CLEOCIN T) 1 % external solution  •  gabapentin (NEURONTIN) 100 mg capsule  •  naproxen (NAPROSYN) 500 mg tablet  •  orphenadrine (NORFLEX) 100 mg tablet  •  traMADol (ULTRAM) 50 mg tablet    Allergies   Allergen Reactions   • Iodinated Contrast Media Swelling and Eye Swelling     IVP dye specifically; swollen eye lid shut   • Morphine Vomiting     Reaction Date: [id number];    • Morphine And Related      DENIES ALLERGY RELATES N/V TO ANESTHESIA   • Vicodin [Hydrocodone-Acetaminophen]      No allergy to tylenol   Does have N/V to vicoden   • Effexor [Venlafaxine] Arthralgia   • Gentamicin Rash     Reaction Date: [id number];        Objective     /79   Pulse (!) 47   Temp 97.7 °F (36.5 °C) (Temporal)   Resp 16   Ht 5' 4" (1.626 m)   Wt 65.3 kg (144 lb)   SpO2 99%   BMI 24.72 kg/m²       PHYSICAL EXAM    Gen: NAD  Head: NCAT  CV: RRR  CHEST: Clear  ABD: soft, NT/ND  EXT: no edema      ASSESSMENT/PLAN:  This is a 65y.o. year old female here for EGD, and she is stable and optimized for her procedure.

## 2023-08-01 NOTE — ANESTHESIA PREPROCEDURE EVALUATION
Procedure:  PRE-OP ONLY    Relevant Problems   CARDIO   (+) Chronic midline thoracic back pain   (+) Hyperlipidemia   (+) Pelvic congestion syndrome      MUSCULOSKELETAL   (+) Acromioclavicular joint arthritis   (+) Chronic lower back pain   (+) Chronic midline thoracic back pain   (+) DJD (degenerative joint disease)   (+) DJD (degenerative joint disease), cervical   (+) Glenohumeral arthritis, right   (+) Gout   (+) Lumbar degenerative disc disease      NEURO/PSYCH   (+) Anxiety   (+) Chronic lower back pain   (+) Chronic midline thoracic back pain   (+) Episodic tension-type headache, not intractable   (+) Pelvic congestion syndrome      PULMONARY   (+) BRENDA (obstructive sleep apnea)      GERD      Physical Exam    Airway    Mallampati score: II  TM Distance: >3 FB  Neck ROM: full     Dental   No notable dental hx     Cardiovascular  Cardiovascular exam normal    Pulmonary  Pulmonary exam normal     Other Findings        Anesthesia Plan  ASA Score- 2     Anesthesia Type- IV sedation with anesthesia with ASA Monitors. Additional Monitors:   Airway Plan:           Plan Factors-Exercise tolerance (METS): >4 METS. Chart reviewed. EKG reviewed. Imaging results reviewed. Existing labs reviewed. Patient summary reviewed. Patient is not a current smoker. Patient not instructed to abstain from smoking on day of procedure. Patient did not smoke on day of surgery. Obstructive sleep apnea risk education given perioperatively. Induction- intravenous. Postoperative Plan-     Informed Consent- Anesthetic plan and risks discussed with patient. I personally reviewed this patient with the CRNA. Discussed and agreed on the Anesthesia Plan with the CRNA. Roger Denis

## 2023-08-04 PROCEDURE — 88313 SPECIAL STAINS GROUP 2: CPT | Performed by: PATHOLOGY

## 2023-08-04 PROCEDURE — 88305 TISSUE EXAM BY PATHOLOGIST: CPT | Performed by: PATHOLOGY

## 2023-08-12 ENCOUNTER — LAB (OUTPATIENT)
Dept: LAB | Facility: CLINIC | Age: 58
End: 2023-08-12
Payer: COMMERCIAL

## 2023-08-12 ENCOUNTER — APPOINTMENT (OUTPATIENT)
Dept: LAB | Facility: CLINIC | Age: 58
End: 2023-08-12
Payer: COMMERCIAL

## 2023-08-12 DIAGNOSIS — G25.0 BENIGN FAMILIAL TREMOR: ICD-10-CM

## 2023-08-12 DIAGNOSIS — E78.2 MIXED HYPERLIPIDEMIA: ICD-10-CM

## 2023-08-12 DIAGNOSIS — M10.9 GOUT, UNSPECIFIED CAUSE, UNSPECIFIED CHRONICITY, UNSPECIFIED SITE: ICD-10-CM

## 2023-08-12 DIAGNOSIS — G47.33 OSA (OBSTRUCTIVE SLEEP APNEA): ICD-10-CM

## 2023-08-12 DIAGNOSIS — R53.82 CHRONIC FATIGUE: ICD-10-CM

## 2023-08-12 DIAGNOSIS — R21 FACIAL RASH: ICD-10-CM

## 2023-08-12 DIAGNOSIS — Z00.8 HEALTH EXAMINATION IN POPULATION SURVEY: ICD-10-CM

## 2023-08-12 DIAGNOSIS — F51.01 PRIMARY INSOMNIA: ICD-10-CM

## 2023-08-12 DIAGNOSIS — R53.83 FATIGUE, UNSPECIFIED TYPE: ICD-10-CM

## 2023-08-12 DIAGNOSIS — Z82.69 FAMILY HISTORY OF SYSTEMIC LUPUS ERYTHEMATOSUS: ICD-10-CM

## 2023-08-12 DIAGNOSIS — G47.00 INSOMNIA, UNSPECIFIED TYPE: ICD-10-CM

## 2023-08-12 DIAGNOSIS — E55.9 VITAMIN D DEFICIENCY: ICD-10-CM

## 2023-08-12 DIAGNOSIS — Z11.59 NEED FOR HEPATITIS B SCREENING TEST: ICD-10-CM

## 2023-08-12 LAB
25(OH)D3 SERPL-MCNC: 41.2 NG/ML (ref 30–100)
ALBUMIN SERPL BCP-MCNC: 4.5 G/DL (ref 3.5–5)
ALP SERPL-CCNC: 81 U/L (ref 34–104)
ALT SERPL W P-5'-P-CCNC: 15 U/L (ref 7–52)
ANION GAP SERPL CALCULATED.3IONS-SCNC: 8 MMOL/L
AST SERPL W P-5'-P-CCNC: 22 U/L (ref 13–39)
BASOPHILS # BLD AUTO: 0.04 THOUSANDS/ÂΜL (ref 0–0.1)
BASOPHILS NFR BLD AUTO: 1 % (ref 0–1)
BILIRUB SERPL-MCNC: 0.6 MG/DL (ref 0.2–1)
BUN SERPL-MCNC: 13 MG/DL (ref 5–25)
CALCIUM SERPL-MCNC: 9.3 MG/DL (ref 8.4–10.2)
CHLORIDE SERPL-SCNC: 104 MMOL/L (ref 96–108)
CHOLEST SERPL-MCNC: 281 MG/DL
CO2 SERPL-SCNC: 28 MMOL/L (ref 21–32)
CREAT SERPL-MCNC: 0.84 MG/DL (ref 0.6–1.3)
CRP SERPL QL: 2.5 MG/L
EOSINOPHIL # BLD AUTO: 0.24 THOUSAND/ÂΜL (ref 0–0.61)
EOSINOPHIL NFR BLD AUTO: 5 % (ref 0–6)
ERYTHROCYTE [DISTWIDTH] IN BLOOD BY AUTOMATED COUNT: 12.2 % (ref 11.6–15.1)
ERYTHROCYTE [SEDIMENTATION RATE] IN BLOOD: 20 MM/HOUR (ref 0–29)
GFR SERPL CREATININE-BSD FRML MDRD: 77 ML/MIN/1.73SQ M
GLUCOSE P FAST SERPL-MCNC: 81 MG/DL (ref 65–99)
HCT VFR BLD AUTO: 41.7 % (ref 34.8–46.1)
HDLC SERPL-MCNC: 67 MG/DL
HGB BLD-MCNC: 13.6 G/DL (ref 11.5–15.4)
IMM GRANULOCYTES # BLD AUTO: 0.01 THOUSAND/UL (ref 0–0.2)
IMM GRANULOCYTES NFR BLD AUTO: 0 % (ref 0–2)
LDLC SERPL CALC-MCNC: 189 MG/DL (ref 0–100)
LYMPHOCYTES # BLD AUTO: 1.29 THOUSANDS/ÂΜL (ref 0.6–4.47)
LYMPHOCYTES NFR BLD AUTO: 28 % (ref 14–44)
MCH RBC QN AUTO: 29.2 PG (ref 26.8–34.3)
MCHC RBC AUTO-ENTMCNC: 32.6 G/DL (ref 31.4–37.4)
MCV RBC AUTO: 90 FL (ref 82–98)
MONOCYTES # BLD AUTO: 0.48 THOUSAND/ÂΜL (ref 0.17–1.22)
MONOCYTES NFR BLD AUTO: 11 % (ref 4–12)
NEUTROPHILS # BLD AUTO: 2.48 THOUSANDS/ÂΜL (ref 1.85–7.62)
NEUTS SEG NFR BLD AUTO: 55 % (ref 43–75)
NRBC BLD AUTO-RTO: 0 /100 WBCS
PLATELET # BLD AUTO: 218 THOUSANDS/UL (ref 149–390)
PMV BLD AUTO: 10.6 FL (ref 8.9–12.7)
POTASSIUM SERPL-SCNC: 3.9 MMOL/L (ref 3.5–5.3)
PROT SERPL-MCNC: 7.3 G/DL (ref 6.4–8.4)
RBC # BLD AUTO: 4.66 MILLION/UL (ref 3.81–5.12)
SODIUM SERPL-SCNC: 140 MMOL/L (ref 135–147)
T4 FREE SERPL-MCNC: 0.86 NG/DL (ref 0.61–1.12)
TRIGL SERPL-MCNC: 127 MG/DL
TSH SERPL DL<=0.05 MIU/L-ACNC: 3.39 UIU/ML (ref 0.45–4.5)
URATE SERPL-MCNC: 4.4 MG/DL (ref 2–7.5)
VIT B12 SERPL-MCNC: 908 PG/ML (ref 180–914)
WBC # BLD AUTO: 4.54 THOUSAND/UL (ref 4.31–10.16)

## 2023-08-12 PROCEDURE — 80053 COMPREHEN METABOLIC PANEL: CPT

## 2023-08-12 PROCEDURE — 36415 COLL VENOUS BLD VENIPUNCTURE: CPT

## 2023-08-12 PROCEDURE — 84550 ASSAY OF BLOOD/URIC ACID: CPT

## 2023-08-12 PROCEDURE — 83036 HEMOGLOBIN GLYCOSYLATED A1C: CPT

## 2023-08-12 PROCEDURE — 86704 HEP B CORE ANTIBODY TOTAL: CPT

## 2023-08-12 PROCEDURE — 87340 HEPATITIS B SURFACE AG IA: CPT

## 2023-08-12 PROCEDURE — 80061 LIPID PANEL: CPT

## 2023-08-12 PROCEDURE — 82607 VITAMIN B-12: CPT

## 2023-08-12 PROCEDURE — 85025 COMPLETE CBC W/AUTO DIFF WBC: CPT

## 2023-08-12 PROCEDURE — 85652 RBC SED RATE AUTOMATED: CPT

## 2023-08-12 PROCEDURE — 86038 ANTINUCLEAR ANTIBODIES: CPT

## 2023-08-12 PROCEDURE — 86618 LYME DISEASE ANTIBODY: CPT

## 2023-08-12 PROCEDURE — 82306 VITAMIN D 25 HYDROXY: CPT

## 2023-08-12 PROCEDURE — 86140 C-REACTIVE PROTEIN: CPT

## 2023-08-12 PROCEDURE — 84443 ASSAY THYROID STIM HORMONE: CPT

## 2023-08-12 PROCEDURE — 86706 HEP B SURFACE ANTIBODY: CPT

## 2023-08-12 PROCEDURE — 84439 ASSAY OF FREE THYROXINE: CPT

## 2023-08-13 LAB
ANA SER QL IA: NEGATIVE
B BURGDOR IGG+IGM SER QL IA: NEGATIVE
EST. AVERAGE GLUCOSE BLD GHB EST-MCNC: 117 MG/DL
HBA1C MFR BLD: 5.7 %
HBV CORE AB SER QL: NORMAL
HBV SURFACE AB SER-ACNC: 87.2 MIU/ML
HBV SURFACE AG SER QL: NORMAL

## 2023-08-14 NOTE — RESULT ENCOUNTER NOTE
Tests were not normal, and should follow at  your scheduled Office visit. Please call about this, if [employer] message is not available or not read by patient.

## 2023-08-15 ENCOUNTER — TELEPHONE (OUTPATIENT)
Dept: FAMILY MEDICINE CLINIC | Facility: CLINIC | Age: 58
End: 2023-08-15

## 2023-08-15 NOTE — TELEPHONE ENCOUNTER
Left detailed message advising patient of results and recommendations. Patient was advised to call back to schedule follow up appointment.

## 2023-08-15 NOTE — TELEPHONE ENCOUNTER
----- Message from Allison Robles MD sent at 8/14/2023  4:32 PM EDT -----  Tests were not normal, and should follow at  your scheduled Office visit. Please call about this, if [employer] message is not available or not read by patient.

## 2023-08-17 ENCOUNTER — ANNUAL EXAM (OUTPATIENT)
Dept: GYNECOLOGY | Facility: CLINIC | Age: 58
End: 2023-08-17
Payer: COMMERCIAL

## 2023-08-17 VITALS
SYSTOLIC BLOOD PRESSURE: 98 MMHG | BODY MASS INDEX: 25.23 KG/M2 | DIASTOLIC BLOOD PRESSURE: 60 MMHG | HEIGHT: 64 IN | WEIGHT: 147.8 LBS | HEART RATE: 53 BPM

## 2023-08-17 DIAGNOSIS — Z78.0 MENOPAUSE: Primary | ICD-10-CM

## 2023-08-17 DIAGNOSIS — N95.2 ATROPHIC VAGINITIS: ICD-10-CM

## 2023-08-17 DIAGNOSIS — Z13.820 SCREENING FOR OSTEOPOROSIS: ICD-10-CM

## 2023-08-17 DIAGNOSIS — Z01.419 ENCOUNTER FOR GYNECOLOGICAL EXAMINATION WITHOUT ABNORMAL FINDING: ICD-10-CM

## 2023-08-17 PROCEDURE — 76977 US BONE DENSITY MEASURE: CPT | Performed by: OBSTETRICS & GYNECOLOGY

## 2023-08-17 PROCEDURE — G0145 SCR C/V CYTO,THINLAYER,RESCR: HCPCS | Performed by: OBSTETRICS & GYNECOLOGY

## 2023-08-17 PROCEDURE — S0612 ANNUAL GYNECOLOGICAL EXAMINA: HCPCS | Performed by: OBSTETRICS & GYNECOLOGY

## 2023-08-17 RX ORDER — ESTRADIOL 10 UG/1
1 INSERT VAGINAL 2 TIMES WEEKLY
Qty: 24 TABLET | Refills: 3 | Status: SHIPPED | OUTPATIENT
Start: 2023-08-17 | End: 2023-11-07

## 2023-08-17 NOTE — PROGRESS NOTES
Assessment/Plan:       Diagnoses and all orders for this visit:    Menopause  -     DXA bone density spine hip and pelvis; Future    Screening for osteoporosis; peripheral scan -1.0 DEXA scan ordered  -     DXA bone density spine hip and pelvis; Future    Encounter for gynecological examination without abnormal finding    Atrophic vaginitis  -     estradiol (VAGIFEM, YUVAFEM) 10 MCG TABS vaginal tablet; Insert 1 tablet (10 mcg total) into the vagina 2 (two) times a week for 24 doses        Subjective:      Patient ID: Teresa Quinones is a 65 y.o. female. HPI patient presents for annual examination. She offers no complaints. Denies any vaginal irritation, burning, discharge or bleeding. Denies any dysuria, hematuria urgency or urinary incontinence. She is still experiencing vasomotor symptoms but declines HRT. She has been using estradiol vaginal tablets twice weekly. No GI complaints. Colonoscopy December 2018.   Normal.  Repeat in 10 years    Osteoporosis screening via peripheral scan May 2021.  -0.3    The following portions of the patient's history were reviewed and updated as appropriate:   She  has a past medical history of Acid reflux, Allergic reaction to dye, Aneurysm of thoracic aorta (HCC), Anxiety, Arthritis, Atrophic vaginitis, Benign familial tremor, Cervical back pain with evidence of disc disease, Chronic otitis media of right ear, Contact dermatitis, Dermatitis, Disturbance of smell, DJD (degenerative joint disease), Endometriosis, Esophageal reflux, Extremity pain, Folliculitis, Headache, Herpes simplex, High cholesterol, Hyperlipidemia, Lateral epicondylitis of left elbow, Lumbar back pain, Myofascial pain syndrome, Neoplasm of skin, Osteoarthritis, PONV (postoperative nausea and vomiting), Postmenopausal atrophic vaginitis, Postmenopausal bleeding (5/16/2017), Prophylactic antibiotic, Rotator cuff disorder, Sleep apnea, Taste impairment, Tremor, hereditary, benign, and Wound, open, finger. She   Patient Active Problem List    Diagnosis Date Noted   • Gout 01/26/2023   • Recurrent cold sores 01/26/2023   • Glenohumeral arthritis, right 06/15/2021   • Tendinitis of right rotator cuff 08/22/2019   • Subacromial bursitis of right shoulder joint 08/22/2019   • Bee sting reaction 07/16/2019   • Insomnia 05/23/2019   • IT band syndrome 05/23/2019   • Biceps tendinitis 05/23/2019   • Acromioclavicular joint arthritis 05/23/2019   • Knee pain, chronic 03/08/2019   • Chronic midline thoracic back pain 12/24/2018   • Colon cancer screening 09/07/2018   • Chronic fatigue 07/11/2018   • Bilateral carpal tunnel syndrome 05/10/2018   • Endometrial polyp 05/16/2017   • BRENDA (obstructive sleep apnea) 10/12/2016   • Episodic tension-type headache, not intractable 10/05/2016   • Status post total replacement of left hip 05/17/2016   • Anxiety 05/17/2016   • Menopause 05/10/2016   • DJD (degenerative joint disease)    • Dense breasts 04/07/2016   • Hemangioma of skin 03/10/2015   • Lumbar degenerative disc disease 06/03/2014   • Chronic lower back pain 05/06/2014   • Pelvic and perineal pain 01/24/2014   • Pelvic congestion syndrome 01/24/2014   • Cervical radiculopathy 08/26/2013   • DJD (degenerative joint disease), cervical 08/26/2013   • Herniated cervical disc 06/24/2013   • Benign familial tremor 06/14/2012   • Endometriosis 06/14/2012   • Hyperlipidemia 06/14/2012     She  has a past surgical history that includes Diagnostic laparoscopy; Total hip arthroplasty (Right); Revision total hip arthroplasty (06/15/2011); pr arthrp acetblr/prox fem prostc agrft/algrft (Left, 5/16/2016); Total hip arthroplasty (10/20/2010); Laparoscopic endometriosis fulguration; Colonoscopy; Upper gastrointestinal endoscopy; pr esophagogastroduodenoscopy transoral diagnostic (N/A, 12/6/2018); and pr colonoscopy flx dx w/collj spec when pfrmd (N/A, 12/6/2018).   Her family history includes Arthritis in her brother and father; Atrial fibrillation in her mother; Breast cancer in her other and other; Breast cancer (age of onset: 48) in her maternal aunt; Breast cancer (age of onset: 72) in her maternal uncle; Cancer in her paternal grandmother; Colon cancer (age of onset: 66) in her paternal grandmother; Diverticulitis in her mother; Esophageal cancer (age of onset: 80) in her father; Hypertension in her mother; No Known Problems in her brother, maternal aunt, maternal grandfather, maternal grandmother, paternal grandfather, sister, and sister; Other in her brother and brother; Prostate cancer (age of onset: 80) in her paternal uncle; Stroke in her father and mother. She  reports that she has never smoked. She has never used smokeless tobacco. She reports current alcohol use. She reports that she does not use drugs. Current Outpatient Medications   Medication Sig Dispense Refill   • ALPRAZolam (XANAX) 0.25 mg tablet Take 1 tablet (0.25 mg total) by mouth daily at bedtime as needed for anxiety 30 tablet 0   • ascorbic acid (VITAMIN C) 500 mg tablet Take 500 mg by mouth daily      • Calcium-Vitamin D (CALTRATE 600 PLUS-VIT D PO) Take 600 mg by mouth daily      • clindamycin (CLEOCIN T) 1 % external solution apply topically to affected area AS NEEDED FOR FLARES OF THE SCALP  0   • estradiol (VAGIFEM, YUVAFEM) 10 MCG TABS vaginal tablet Insert 1 tablet (10 mcg total) into the vagina 2 (two) times a week for 24 doses 24 tablet 3   • gabapentin (NEURONTIN) 100 mg capsule Take 1 capsule (100 mg total) by mouth 3 (three) times a day 90 capsule 1   • Suvorexant (Belsomra) 15 MG TABS Take 1 tablet (15 mg total) by mouth daily at bedtime 90 tablet 0   • TURMERIC PO Take 1 capsule by mouth daily Omega and tumeric     • zinc gluconate 50 mg tablet Take 50 mg by mouth daily.      • naproxen (NAPROSYN) 500 mg tablet Take 1 tablet (500 mg total) by mouth 2 (two) times a day with meals (Patient not taking: Reported on 8/17/2023) 60 tablet 1   • orphenadrine (NORFLEX) 100 mg tablet Take 1 tablet (100 mg total) by mouth daily as needed for muscle spasms (Patient not taking: Reported on 8/17/2023) 30 tablet 5   • traMADol (ULTRAM) 50 mg tablet Take 1 tablet (50 mg total) by mouth every 6 (six) hours as needed for severe pain (Patient not taking: Reported on 5/5/2023) 20 tablet 0   • valACYclovir (VALTREX) 1,000 mg tablet TAKE 1 TABLET BY MOUTH DAILY AS NEEDED FOR ORAL LESION FOR UP TO 4 DAYS (Patient not taking: Reported on 8/17/2023) 4 tablet 5     No current facility-administered medications for this visit. Current Outpatient Medications on File Prior to Visit   Medication Sig   • ALPRAZolam (XANAX) 0.25 mg tablet Take 1 tablet (0.25 mg total) by mouth daily at bedtime as needed for anxiety   • ascorbic acid (VITAMIN C) 500 mg tablet Take 500 mg by mouth daily    • Calcium-Vitamin D (CALTRATE 600 PLUS-VIT D PO) Take 600 mg by mouth daily    • clindamycin (CLEOCIN T) 1 % external solution apply topically to affected area AS NEEDED FOR FLARES OF THE SCALP   • gabapentin (NEURONTIN) 100 mg capsule Take 1 capsule (100 mg total) by mouth 3 (three) times a day   • Suvorexant (Belsomra) 15 MG TABS Take 1 tablet (15 mg total) by mouth daily at bedtime   • TURMERIC PO Take 1 capsule by mouth daily Omega and tumeric   • zinc gluconate 50 mg tablet Take 50 mg by mouth daily.    • [DISCONTINUED] estradiol (VAGIFEM, YUVAFEM) 10 MCG TABS vaginal tablet Insert 1 tablet (10 mcg total) into the vagina 2 (two) times a week   • naproxen (NAPROSYN) 500 mg tablet Take 1 tablet (500 mg total) by mouth 2 (two) times a day with meals (Patient not taking: Reported on 8/17/2023)   • orphenadrine (NORFLEX) 100 mg tablet Take 1 tablet (100 mg total) by mouth daily as needed for muscle spasms (Patient not taking: Reported on 8/17/2023)   • traMADol (ULTRAM) 50 mg tablet Take 1 tablet (50 mg total) by mouth every 6 (six) hours as needed for severe pain (Patient not taking: Reported on 5/5/2023) • valACYclovir (VALTREX) 1,000 mg tablet TAKE 1 TABLET BY MOUTH DAILY AS NEEDED FOR ORAL LESION FOR UP TO 4 DAYS (Patient not taking: Reported on 8/17/2023)     No current facility-administered medications on file prior to visit. She is allergic to iodinated contrast media, morphine, morphine and related, vicodin [hydrocodone-acetaminophen], effexor [venlafaxine], and gentamicin. .    Review of Systems   Constitutional: Negative. HENT: Negative for sore throat and trouble swallowing. Gastrointestinal: Negative. Genitourinary: Negative. Objective:      BP 98/60   Pulse (!) 53   Ht 5' 4" (1.626 m)   Wt 67 kg (147 lb 12.8 oz)   BMI 25.37 kg/m²          Physical Exam  Vitals reviewed. Constitutional:       Appearance: Normal appearance. Cardiovascular:      Rate and Rhythm: Normal rate and regular rhythm. Pulses: Normal pulses. Heart sounds: Normal heart sounds. No murmur heard. Pulmonary:      Effort: Pulmonary effort is normal. No respiratory distress. Breath sounds: Normal breath sounds. Chest:   Breasts:     Right: No swelling, bleeding, inverted nipple, mass, nipple discharge, skin change or tenderness. Left: No swelling, bleeding, inverted nipple, mass, nipple discharge, skin change or tenderness. Abdominal:      General: There is no distension. Palpations: Abdomen is soft. There is no mass. Tenderness: There is no abdominal tenderness. There is no guarding or rebound. Hernia: No hernia is present. There is no hernia in the left inguinal area or right inguinal area. Genitourinary:     General: Normal vulva. Labia:         Right: No rash, tenderness or lesion. Left: No rash, tenderness or lesion. Vagina: Normal.      Cervix: Normal.      Uterus: Normal.       Adnexa:         Right: No mass, tenderness or fullness. Left: No mass, tenderness or fullness.      Musculoskeletal:      Cervical back: Normal range of motion and neck supple. No tenderness. Lymphadenopathy:      Cervical: No cervical adenopathy. Upper Body:      Right upper body: No supraclavicular, axillary or pectoral adenopathy. Left upper body: No supraclavicular, axillary or pectoral adenopathy. Lower Body: No right inguinal adenopathy. No left inguinal adenopathy. Neurological:      Mental Status: She is alert.

## 2023-08-20 DIAGNOSIS — G47.00 INSOMNIA, UNSPECIFIED TYPE: ICD-10-CM

## 2023-08-21 RX ORDER — SUVOREXANT 15 MG/1
1 TABLET, FILM COATED ORAL
Qty: 90 TABLET | Refills: 1 | Status: SHIPPED | OUTPATIENT
Start: 2023-08-21

## 2023-08-24 LAB
LAB AP GYN PRIMARY INTERPRETATION: NORMAL
Lab: NORMAL

## 2023-08-31 DIAGNOSIS — Z12.31 ENCOUNTER FOR SCREENING MAMMOGRAM FOR MALIGNANT NEOPLASM OF BREAST: Primary | ICD-10-CM

## 2023-09-26 ENCOUNTER — OFFICE VISIT (OUTPATIENT)
Dept: FAMILY MEDICINE CLINIC | Facility: CLINIC | Age: 58
End: 2023-09-26
Payer: COMMERCIAL

## 2023-09-26 VITALS
SYSTOLIC BLOOD PRESSURE: 122 MMHG | OXYGEN SATURATION: 98 % | BODY MASS INDEX: 25.83 KG/M2 | HEART RATE: 79 BPM | DIASTOLIC BLOOD PRESSURE: 74 MMHG | TEMPERATURE: 97.8 F | WEIGHT: 150.5 LBS

## 2023-09-26 DIAGNOSIS — B00.1 HERPES LABIALIS: ICD-10-CM

## 2023-09-26 DIAGNOSIS — G89.29 CHRONIC MIDLINE THORACIC BACK PAIN: ICD-10-CM

## 2023-09-26 DIAGNOSIS — M15.9 PRIMARY OSTEOARTHRITIS INVOLVING MULTIPLE JOINTS: ICD-10-CM

## 2023-09-26 DIAGNOSIS — M54.6 CHRONIC MIDLINE THORACIC BACK PAIN: ICD-10-CM

## 2023-09-26 DIAGNOSIS — B02.9 HERPES ZOSTER WITHOUT COMPLICATION: Primary | ICD-10-CM

## 2023-09-26 DIAGNOSIS — M54.2 NECK PAIN: ICD-10-CM

## 2023-09-26 DIAGNOSIS — M54.12 CERVICAL RADICULOPATHY: ICD-10-CM

## 2023-09-26 PROCEDURE — 99214 OFFICE O/P EST MOD 30 MIN: CPT | Performed by: PHYSICIAN ASSISTANT

## 2023-09-26 RX ORDER — METHOCARBAMOL 750 MG/1
750 TABLET, FILM COATED ORAL EVERY 8 HOURS PRN
Qty: 90 TABLET | Refills: 0 | Status: SHIPPED | OUTPATIENT
Start: 2023-09-26

## 2023-09-26 RX ORDER — GABAPENTIN 100 MG/1
100 CAPSULE ORAL 3 TIMES DAILY
Qty: 90 CAPSULE | Refills: 1 | Status: SHIPPED | OUTPATIENT
Start: 2023-09-26 | End: 2023-09-29 | Stop reason: SDUPTHER

## 2023-09-26 RX ORDER — NAPROXEN 500 MG/1
500 TABLET ORAL 2 TIMES DAILY WITH MEALS
Qty: 60 TABLET | Refills: 1 | Status: SHIPPED | OUTPATIENT
Start: 2023-09-26

## 2023-09-26 RX ORDER — VALACYCLOVIR HYDROCHLORIDE 1 G/1
1000 TABLET, FILM COATED ORAL 3 TIMES DAILY
Qty: 21 TABLET | Refills: 0 | Status: SHIPPED | OUTPATIENT
Start: 2023-09-26 | End: 2023-10-03

## 2023-09-26 RX ORDER — TRAMADOL HYDROCHLORIDE 50 MG/1
50 TABLET ORAL EVERY 6 HOURS PRN
Qty: 20 TABLET | Refills: 0 | Status: SHIPPED | OUTPATIENT
Start: 2023-09-26 | End: 2023-10-04 | Stop reason: SDUPTHER

## 2023-09-26 RX ORDER — LIDOCAINE 50 MG/G
1 PATCH TOPICAL DAILY
Qty: 20 PATCH | Refills: 0 | Status: SHIPPED | OUTPATIENT
Start: 2023-09-26

## 2023-09-26 NOTE — PROGRESS NOTES
Name: Puma Bella      : 1965      MRN: [id number]  Encounter Provider: Mandi Child PA-C  Encounter Date: 2023   Encounter department: [facility] PRIMARY CARE    Assessment & Plan     Patient Instructions   Assessment/plan:  1. Shingles-patient with shingles on the right lower thoracic area. She has a group of vesicles present in the T8 dermatome approximately. She will be given Valtrex 1 g 3 times daily for 7 days. She will continue with gabapentin, naproxen, and tramadol at nighttime as necessary. We will also provide Lidoderm patches to be used up to 12 hours daily as needed topically on the area. 2.  Back pain-symptoms did start about 2 weeks prior to the onset of rash. Most likely related to shingles but if it is persistent afterward would consider physical therapy. 3.  History of neck pain-refill of Robaxin given. 1. Herpes zoster without complication  -     valACYclovir (VALTREX) 1,000 mg tablet; Take 1 tablet (1,000 mg total) by mouth 3 (three) times a day for 7 days  -     lidocaine (Lidoderm) 5 %; Apply 1 patch topically over 12 hours daily Remove & Discard patch within 12 hours or as directed by MD    2. Herpes labialis    3. Neck pain  Comments:  may use naproxen at night and see if we can calm the flaring symptoms  okay to use with belsomra  Orders:  -     naproxen (NAPROSYN) 500 mg tablet; Take 1 tablet (500 mg total) by mouth 2 (two) times a day with meals  -     methocarbamol (Robaxin-750) 750 mg tablet; Take 1 tablet (750 mg total) by mouth every 8 (eight) hours as needed for muscle spasms    4. Primary osteoarthritis involving multiple joints    5. Cervical radiculopathy  Comments:  agree with neurontin prn for pain   she is in PT  will follow with hand surgery after PT to see next rec  Orders:  -     gabapentin (NEURONTIN) 100 mg capsule; Take 1 capsule (100 mg total) by mouth 3 (three) times a day    6.  Chronic midline thoracic back pain  Comments:  tramadol prn  Orders:  -     traMADol (ULTRAM) 50 mg tablet; Take 1 tablet (50 mg total) by mouth every 6 (six) hours as needed for severe pain           Subjective      HPI: This is a 18-year-old female who presents to the office with concerns over pain in her right thoracic area for about 3 weeks. She thought it was related to volleyball that she had been playing recently but did not notice any moment where there was a sudden pain or snap or pop. Pain has been localized to the right lower thoracic area. Pain had been more consistent over the past 5 days or so and about 2 days ago she developed a small rash on the area. She has been using gabapentin, naproxen, tramadol at bedtime for the pain because it has been quite intense at times. Review of Systems   Constitutional: Negative for chills, fatigue and fever. HENT: Negative for congestion, ear pain and sinus pressure. Eyes: Negative for visual disturbance. Respiratory: Negative for cough, chest tightness and shortness of breath. Cardiovascular: Negative for chest pain and palpitations. Gastrointestinal: Negative for diarrhea, nausea and vomiting. Endocrine: Negative for polyuria. Genitourinary: Negative for dysuria and frequency. Musculoskeletal: Positive for arthralgias, back pain and myalgias. Skin: Positive for rash. Negative for pallor. Neurological: Negative for dizziness, weakness, light-headedness, numbness and headaches. Psychiatric/Behavioral: Negative for agitation, behavioral problems and sleep disturbance. All other systems reviewed and are negative.       Current Outpatient Medications on File Prior to Visit   Medication Sig   • ALPRAZolam (XANAX) 0.25 mg tablet Take 1 tablet (0.25 mg total) by mouth daily at bedtime as needed for anxiety   • ascorbic acid (VITAMIN C) 500 mg tablet Take 500 mg by mouth daily    • Calcium-Vitamin D (CALTRATE 600 PLUS-VIT D PO) Take 600 mg by mouth daily    • clindamycin (CLEOCIN T) 1 % external solution apply topically to affected area AS NEEDED FOR FLARES OF THE SCALP   • estradiol (VAGIFEM, YUVAFEM) 10 MCG TABS vaginal tablet Insert 1 tablet (10 mcg total) into the vagina 2 (two) times a week for 24 doses   • orphenadrine (NORFLEX) 100 mg tablet Take 1 tablet (100 mg total) by mouth daily as needed for muscle spasms   • Suvorexant (Belsomra) 15 MG TABS Take 1 tablet (15 mg total) by mouth daily at bedtime   • TURMERIC PO Take 1 capsule by mouth daily Omega and tumeric   • [DISCONTINUED] gabapentin (NEURONTIN) 100 mg capsule Take 1 capsule (100 mg total) by mouth 3 (three) times a day   • [DISCONTINUED] naproxen (NAPROSYN) 500 mg tablet Take 1 tablet (500 mg total) by mouth 2 (two) times a day with meals   • [DISCONTINUED] traMADol (ULTRAM) 50 mg tablet Take 1 tablet (50 mg total) by mouth every 6 (six) hours as needed for severe pain   • zinc gluconate 50 mg tablet Take 50 mg by mouth daily. • [DISCONTINUED] valACYclovir (VALTREX) 1,000 mg tablet TAKE 1 TABLET BY MOUTH DAILY AS NEEDED FOR ORAL LESION FOR UP TO 4 DAYS (Patient not taking: Reported on 8/17/2023)       Objective     /74 (BP Location: Left arm, Patient Position: Sitting, Cuff Size: Standard)   Pulse 79   Temp 97.8 °F (36.6 °C) (Temporal)   Wt 68.3 kg (150 lb 8 oz)   SpO2 98%   BMI 25.83 kg/m²     Physical Exam  Vitals and nursing note reviewed. Constitutional:       General: She is not in acute distress. Appearance: She is well-developed. HENT:      Head: Normocephalic and atraumatic. Right Ear: External ear normal.      Left Ear: External ear normal.      Nose: Nose normal.      Mouth/Throat:      Pharynx: No oropharyngeal exudate. Eyes:      Conjunctiva/sclera: Conjunctivae normal.      Pupils: Pupils are equal, round, and reactive to light. Neck:      Thyroid: No thyromegaly. Trachea: No tracheal deviation. Cardiovascular:      Rate and Rhythm: Normal rate and regular rhythm. Heart sounds: Normal heart sounds. No murmur heard. No friction rub. Pulmonary:      Effort: Pulmonary effort is normal. No respiratory distress. Breath sounds: Normal breath sounds. No wheezing or rales. Abdominal:      General: Bowel sounds are normal. There is no distension. Palpations: Abdomen is soft. Tenderness: There is no abdominal tenderness. There is no guarding or rebound. Musculoskeletal:         General: No tenderness. Normal range of motion. Cervical back: Normal range of motion and neck supple. Lymphadenopathy:      Cervical: No cervical adenopathy. Skin:     General: Skin is warm and dry. Findings: Rash present. No erythema. Comments: Patient has a cluster of grouped vesicles on the right lower thoracic back approximately T8 dermatome. Neurological:      Mental Status: She is alert and oriented to person, place, and time. Cranial Nerves: No cranial nerve deficit. Coordination: Coordination normal.   Psychiatric:         Behavior: Behavior normal.         Thought Content:  Thought content normal.       Ronal Hylton PA-C

## 2023-09-26 NOTE — PATIENT INSTRUCTIONS
Assessment/plan:  1. Shingles-patient with shingles on the right lower thoracic area. She has a group of vesicles present in the T8 dermatome approximately. She will be given Valtrex 1 g 3 times daily for 7 days. She will continue with gabapentin, naproxen, and tramadol at nighttime as necessary. We will also provide Lidoderm patches to be used up to 12 hours daily as needed topically on the area. 2.  Back pain-symptoms did start about 2 weeks prior to the onset of rash. Most likely related to shingles but if it is persistent afterward would consider physical therapy. 3.  History of neck pain-refill of Robaxin given.

## 2023-09-29 ENCOUNTER — TELEPHONE (OUTPATIENT)
Dept: FAMILY MEDICINE CLINIC | Facility: CLINIC | Age: 58
End: 2023-09-29

## 2023-09-29 DIAGNOSIS — M54.12 CERVICAL RADICULOPATHY: ICD-10-CM

## 2023-09-29 RX ORDER — GABAPENTIN 300 MG/1
300 CAPSULE ORAL 3 TIMES DAILY
Qty: 120 CAPSULE | Refills: 0 | Status: SHIPPED | OUTPATIENT
Start: 2023-09-29

## 2023-09-29 NOTE — TELEPHONE ENCOUNTER
----- Message from Deann Winchester sent at 9/29/2023  7:42 AM EDT -----  Regarding: FW: My shingles  Contact: [phone]    ----- Message -----  From: Mil Chahal  Sent: 9/29/2023   5:51 AM EDT  To: [facility]  Subject: My shingles                                      Hi Thaddeus Reg so last night was by far the worst night yet. I maybe got 2 hours sleep taking all the meds. Is there anything else to take for the pain ? It’s like constant fireworks going off and radiating from my back to the front on the right side. I Ended up going home from work early on Wednesday because of the pain and yesterday I was at home and barely managed to get a meal together and last night was absolutely horrible. About how long does this debilitating pain last typically in your experience? Not sure how much more I can take of this. If it lasts longer then a few more days I definitely will need a letter for work.     Thanks     Maria Alejandra

## 2023-09-29 NOTE — LETTER
September 29, 2023     Patient: Chris Cao  YOB: 1965  Date of Visit: 9/29/2023      To Whom it May Concern: Ronal Regan is under my professional care. Maria Alejandra was seen in my office on 9/26/2023. Maria Alejandra has been diagnosed with shingles and may not return to work until Tuesday, 10/3/2023. If you have any questions or concerns, please don't hesitate to call.          Sincerely,          Tommy Quintero PA-C        CC: No Recipients

## 2023-10-02 ENCOUNTER — TELEPHONE (OUTPATIENT)
Dept: FAMILY MEDICINE CLINIC | Facility: CLINIC | Age: 58
End: 2023-10-02

## 2023-10-02 NOTE — TELEPHONE ENCOUNTER
----- Message from 22 Shani Winchester sent at 10/2/2023  8:35 AM EDT -----  Regarding: FW: My shingles  Contact: [phone]    ----- Message -----  From: Alec Díaz  Sent: 9/29/2023  11:27 AM EDT  To: [facility]  Subject: My shingles                                      Thank you Alexandra Brewster !!

## 2023-10-02 NOTE — LETTER
October 2, 2023     Patient: Day Moody  YOB: 1965  Date of Visit: 10/2/2023      To Whom it May Concern: Nani Roslynrosemary is under my professional care. Maria Alejandra was seen in my office on 9/26/2023. Maria Alejandra has been diagnosed with shingles and may not return to work until Wedsnesday, 10/4/2023. If you have any questions or concerns, please don't hesitate to call.          Sincerely,          Laine Quezada PA-C        CC: No Recipients

## 2023-10-04 ENCOUNTER — TELEPHONE (OUTPATIENT)
Dept: FAMILY MEDICINE CLINIC | Facility: CLINIC | Age: 58
End: 2023-10-04

## 2023-10-04 ENCOUNTER — PATIENT MESSAGE (OUTPATIENT)
Dept: FAMILY MEDICINE CLINIC | Facility: CLINIC | Age: 58
End: 2023-10-04

## 2023-10-04 DIAGNOSIS — M54.6 CHRONIC MIDLINE THORACIC BACK PAIN: ICD-10-CM

## 2023-10-04 DIAGNOSIS — G89.29 CHRONIC MIDLINE THORACIC BACK PAIN: ICD-10-CM

## 2023-10-04 RX ORDER — TRAMADOL HYDROCHLORIDE 50 MG/1
50 TABLET ORAL EVERY 6 HOURS PRN
Qty: 20 TABLET | Refills: 0 | Status: SHIPPED | OUTPATIENT
Start: 2023-10-04 | End: 2023-10-04 | Stop reason: SDUPTHER

## 2023-10-04 RX ORDER — TRAMADOL HYDROCHLORIDE 50 MG/1
50 TABLET ORAL EVERY 6 HOURS PRN
Qty: 20 TABLET | Refills: 0 | Status: SHIPPED | OUTPATIENT
Start: 2023-10-04

## 2023-10-04 NOTE — TELEPHONE ENCOUNTER
----- Message from Deann Winchester sent at 10/4/2023  7:54 AM EDT -----  Regarding: FW: Pain  Contact: [phone]    ----- Message -----  From: Ivy Esquivel  Sent: 10/4/2023   5:24 AM EDT  To: [facility]  Subject: Pain                                             Good morning Roger   Is there any way you can refill the tramadol? Ammyching Arteagain been pretty severe pain on my right side and I’ve had a difficult night. I’m taking my tramadol every six hours  (I’m trying to spread it out to seven or eight hours) but at  times like I this barely breathe But I know sooner or later it will pass, and it does. If you have any suggestions, please let me know. I am still taking my gabapentin every eight hours although I cannot take The 300 mg at once. It makes me very nauseous so I have to split up by 100 mg a few hours apart.   Thank you   Maria Alejandra

## 2023-10-06 ENCOUNTER — TELEPHONE (OUTPATIENT)
Dept: FAMILY MEDICINE CLINIC | Facility: CLINIC | Age: 58
End: 2023-10-06

## 2023-10-06 DIAGNOSIS — R10.9 FLANK PAIN: Primary | ICD-10-CM

## 2023-10-06 NOTE — TELEPHONE ENCOUNTER
----- Message from Annika sent at 10/5/2023  3:20 PM EDT -----  Regarding: FW: PAIN  Contact: [phone]    ----- Message -----  From: Jorge Leung  Sent: 10/5/2023  12:22 PM EDT  To: [facility]  Subject: PAIN                                             hi Roger! I’m sorry to bother you again but I have a question for you… The last three early mornings I’ve been having severe right upper quadrant pain as well as what it seems to be like kidney pain… Is this normal for shingles? It starts sometime after midnight and usually I can calm it down with heat and medicine by seven or 8 AM… Thank you.   Maria Alejandra

## 2023-10-06 NOTE — TELEPHONE ENCOUNTER
----- Message from Annika sent at 10/5/2023  3:20 PM EDT -----  Regarding: FW: PAIN  Contact: [phone]    ----- Message -----  From: Kenrick Paniagua  Sent: 10/5/2023  12:22 PM EDT  To: [facility]  Subject: PAIN                                             hi Roger! I’m sorry to bother you again but I have a question for you… The last three early mornings I’ve been having severe right upper quadrant pain as well as what it seems to be like kidney pain… Is this normal for shingles? It starts sometime after midnight and usually I can calm it down with heat and medicine by seven or 8 AM… Thank you.   Maria Alejandra

## 2023-10-06 NOTE — TELEPHONE ENCOUNTER
This is possible because it is in the area of the affected nerve but I would recommend we schedule CT of the abdomen to rule out stones or gallbladder disease.

## 2023-10-26 ENCOUNTER — TELEPHONE (OUTPATIENT)
Dept: FAMILY MEDICINE CLINIC | Facility: CLINIC | Age: 58
End: 2023-10-26

## 2023-10-26 NOTE — TELEPHONE ENCOUNTER
Good afternoon. This is Tintah bright from [employer], the pharmacy benefit management. I'm calling for our member Linwood Beck. Her YOB: 1965. She sees a prescriber from what I'm seeing here in my system, Arturo Najjar [facility] and she was prescribed lidocaine 5% patch. I'm just giving a call regarding an authorization that we have currently pending for the lidocaine. This is authorization number [id number]. Like I said, it's currently pending. We did fax over a clinical questionnaire form. The fax number we have here for you is [phone], not sure if that's correct or not. However, we have not received anything back and we are approaching a deadline on this one. So just reaching out to make sure that this authorization is on your radar. If there's any additional information that you need or if you want to complete this with us verbally, please feel free to give us a call back using the number [phone]. Again, this is for Linwood Beck, date of birth November 10th, 1965. Thanks so much for your time and I hope you have a wonderful rest of your day.  Bye, bye.

## 2023-10-30 ENCOUNTER — OFFICE VISIT (OUTPATIENT)
Dept: FAMILY MEDICINE CLINIC | Facility: CLINIC | Age: 58
End: 2023-10-30
Payer: COMMERCIAL

## 2023-10-30 VITALS
RESPIRATION RATE: 16 BRPM | SYSTOLIC BLOOD PRESSURE: 104 MMHG | HEIGHT: 64 IN | BODY MASS INDEX: 25.51 KG/M2 | TEMPERATURE: 97.3 F | DIASTOLIC BLOOD PRESSURE: 70 MMHG | WEIGHT: 149.4 LBS | HEART RATE: 60 BPM

## 2023-10-30 DIAGNOSIS — R10.9 FLANK PAIN: Primary | ICD-10-CM

## 2023-10-30 DIAGNOSIS — B02.9 HERPES ZOSTER WITHOUT COMPLICATION: ICD-10-CM

## 2023-10-30 DIAGNOSIS — M51.36 LUMBAR DEGENERATIVE DISC DISEASE: ICD-10-CM

## 2023-10-30 PROCEDURE — 99213 OFFICE O/P EST LOW 20 MIN: CPT | Performed by: FAMILY MEDICINE

## 2023-10-30 NOTE — PATIENT INSTRUCTIONS
1. Flank pain  Comments:  ended up with irritation due to shingles. No CT needed. 2. Lumbar degenerative disc disease  Assessment & Plan:  Increased pain at times. Continue exercises, consider PT. Could be lumbar vs thoracic. Since it is not incapacitating, at this point I would consider continuing with as needed Celebrex versus naproxen. Exercises, stretching. 3. Herpes zoster without complication  Assessment & Plan:  Recommend wait 1 year then consider Shingrix vaccine          COVID 19 Instructions    Maria Alejandra Silva was advised to limit contact with others to essential tasks such as getting food, medications, and medical care. Proper handwashing reviewed, and Hand sanitzer when washing is not available. If the patient develops symptoms of COVID 19, the patient should call the office as soon as possible. It is strongly recommended that Flu Vaccinations be obtained. Virtual Visits:  Noah: This works on smart phones (any phone with Internet browsing capability). You should get a text message when the provider is ready to see you. Click on the link in the text message, and the call should start. You will need to type in your name, and allow camera and microphone access. This is HIPPA compliant, and secure. If you have not already done so, get immunized to COVID 19. We are committed to getting you vaccinated as soon as possible and will be closely following CDC and [facility] guidelines as they are released and revised. Please refer to our COVID-19 vaccine webpage for the most up to date information on the vaccine and our distribution efforts. This site will also have the most up to date recommendations for COVID booster vaccine. Claudio    Call [phone] ([phone]), option 7    You can also visit [email or web address] to find vaccines in your area.     OUR LOCATION:    [facility] [address], [address] [place] [address], [phone]  Fax: [phone]    Lab services, Rheumatology, and OB/GYN are at this location as well.

## 2023-10-30 NOTE — PROGRESS NOTES
Name: Hakeem Parra      : 1965      MRN: [id number]  Encounter Provider: Dede Figueredo MD  Encounter Date: 10/30/2023   Encounter department: [place] [address]     1. Flank pain  Comments:  ended up with irritation due to shingles. No CT needed. 2. Lumbar degenerative disc disease  Assessment & Plan:  Increased pain at times. Continue exercises, consider PT. Could be lumbar vs thoracic. Since it is not incapacitating, at this point I would consider continuing with as needed Celebrex versus naproxen. Exercises, stretching. 3. Herpes zoster without complication  Assessment & Plan:  Recommend wait 1 year then consider Shingrix vaccine          Depression Screening and Follow-up Plan: Patient was screened for depression during today's encounter. They screened negative with a PHQ-2 score of 0. Subjective      Chief Complaint   Patient presents with   • Back Pain     Patient in office for ongoing back pain. Here to discuss back pain. Has mid back, at the midline. Pain with certain movements. Has been for a bit now. Is middle of the spine. No issues in the legs. No weakness or numbness. Has been for a bit now. Has been doing home exercise (stretching). Meds: None for this. Occasional naproxen/robaxin. Back Pain  Pertinent negatives include no abdominal pain, chest pain, fever or headaches. Review of Systems   Constitutional:  Negative for appetite change and fever. Respiratory:  Negative for chest tightness and shortness of breath. Cardiovascular:  Negative for chest pain, palpitations and leg swelling. Gastrointestinal:  Negative for abdominal pain. Genitourinary:  Negative for difficulty urinating. Musculoskeletal:  Positive for back pain. Negative for arthralgias and myalgias. Skin:  Negative for wound. Neurological: Negative. Negative for dizziness, light-headedness and headaches. Psychiatric/Behavioral:  Negative for dysphoric mood and sleep disturbance. The patient is not nervous/anxious. Current Outpatient Medications on File Prior to Visit   Medication Sig   • ALPRAZolam (XANAX) 0.25 mg tablet Take 1 tablet (0.25 mg total) by mouth daily at bedtime as needed for anxiety   • ascorbic acid (VITAMIN C) 500 mg tablet Take 500 mg by mouth daily    • Calcium-Vitamin D (CALTRATE 600 PLUS-VIT D PO) Take 600 mg by mouth daily    • clindamycin (CLEOCIN T) 1 % external solution apply topically to affected area AS NEEDED FOR FLARES OF THE SCALP   • estradiol (VAGIFEM, YUVAFEM) 10 MCG TABS vaginal tablet Insert 1 tablet (10 mcg total) into the vagina 2 (two) times a week for 24 doses   • methocarbamol (Robaxin-750) 750 mg tablet Take 1 tablet (750 mg total) by mouth every 8 (eight) hours as needed for muscle spasms   • naproxen (NAPROSYN) 500 mg tablet Take 1 tablet (500 mg total) by mouth 2 (two) times a day with meals   • orphenadrine (NORFLEX) 100 mg tablet Take 1 tablet (100 mg total) by mouth daily as needed for muscle spasms   • Suvorexant (Belsomra) 15 MG TABS Take 1 tablet (15 mg total) by mouth daily at bedtime   • TURMERIC PO Take 1 capsule by mouth daily Omega and tumeric   • valACYclovir (VALTREX) 1,000 mg tablet Take 1 tablet (1,000 mg total) by mouth 3 (three) times a day for 7 days   • zinc gluconate 50 mg tablet Take 50 mg by mouth daily.    • [DISCONTINUED] gabapentin (NEURONTIN) 300 mg capsule Take 1 capsule (300 mg total) by mouth 3 (three) times a day   • [DISCONTINUED] lidocaine (Lidoderm) 5 % Apply 1 patch topically over 12 hours daily Remove & Discard patch within 12 hours or as directed by MD   • [DISCONTINUED] traMADol (ULTRAM) 50 mg tablet Take 1 tablet (50 mg total) by mouth every 6 (six) hours as needed for severe pain       Objective     /70 (BP Location: Right arm, Patient Position: Sitting, Cuff Size: Adult)   Pulse 60   Temp (!) 97.3 °F (36.3 °C) (Temporal)   Resp 16   Ht 5' 4" (1.626 m)   Wt 67.8 kg (149 lb 6.4 oz)   BMI 25.64 kg/m²     Physical Exam  Vitals and nursing note reviewed. Constitutional:       Appearance: Normal appearance. Musculoskeletal:      Lumbar back: Normal.   Neurological:      Mental Status: She is alert.        Charmayne Fruits, MD

## 2023-10-30 NOTE — ASSESSMENT & PLAN NOTE
Increased pain at times. Continue exercises, consider PT. Could be lumbar vs thoracic. Since it is not incapacitating, at this point I would consider continuing with as needed Celebrex versus naproxen. Exercises, stretching.

## 2023-11-02 ENCOUNTER — TELEPHONE (OUTPATIENT)
Dept: GASTROENTEROLOGY | Facility: CLINIC | Age: 58
End: 2023-11-02

## 2023-11-16 ENCOUNTER — VBI (OUTPATIENT)
Dept: ADMINISTRATIVE | Facility: OTHER | Age: 58
End: 2023-11-16

## 2023-11-29 ENCOUNTER — CLINICAL SUPPORT (OUTPATIENT)
Dept: FAMILY MEDICINE CLINIC | Facility: CLINIC | Age: 58
End: 2023-11-29
Payer: COMMERCIAL

## 2023-11-29 DIAGNOSIS — Z23 ENCOUNTER FOR IMMUNIZATION: Primary | ICD-10-CM

## 2023-11-29 PROCEDURE — 90686 IIV4 VACC NO PRSV 0.5 ML IM: CPT

## 2023-11-29 PROCEDURE — 90471 IMMUNIZATION ADMIN: CPT

## 2024-01-05 DIAGNOSIS — N95.2 ATROPHIC VAGINITIS: ICD-10-CM

## 2024-01-05 DIAGNOSIS — B02.9 HERPES ZOSTER WITHOUT COMPLICATION: ICD-10-CM

## 2024-01-05 RX ORDER — ESTRADIOL 10 UG/1
1 INSERT VAGINAL 2 TIMES WEEKLY
Qty: 24 TABLET | Refills: 0 | Status: SHIPPED | OUTPATIENT
Start: 2024-01-08 | End: 2024-03-29

## 2024-01-05 RX ORDER — VALACYCLOVIR HYDROCHLORIDE 1 G/1
1000 TABLET, FILM COATED ORAL 3 TIMES DAILY
Qty: 21 TABLET | Refills: 0 | Status: SHIPPED | OUTPATIENT
Start: 2024-01-05 | End: 2024-01-12

## 2024-01-10 ENCOUNTER — PATIENT MESSAGE (OUTPATIENT)
Dept: FAMILY MEDICINE CLINIC | Facility: CLINIC | Age: 59
End: 2024-01-10

## 2024-01-12 DIAGNOSIS — M51.36 LUMBAR DEGENERATIVE DISC DISEASE: Primary | ICD-10-CM

## 2024-01-14 ENCOUNTER — HOSPITAL ENCOUNTER (OUTPATIENT)
Dept: MAMMOGRAPHY | Facility: HOSPITAL | Age: 59
Discharge: HOME | End: 2024-01-14
Attending: OBSTETRICS & GYNECOLOGY
Payer: COMMERCIAL

## 2024-01-14 ENCOUNTER — HOSPITAL ENCOUNTER (OUTPATIENT)
Dept: RADIOLOGY | Facility: HOSPITAL | Age: 59
Discharge: HOME | End: 2024-01-14
Payer: COMMERCIAL

## 2024-01-14 VITALS — HEIGHT: 64 IN | BODY MASS INDEX: 25.44 KG/M2 | WEIGHT: 149 LBS

## 2024-01-14 DIAGNOSIS — M51.36 LUMBAR DEGENERATIVE DISC DISEASE: ICD-10-CM

## 2024-01-14 DIAGNOSIS — Z12.31 ENCOUNTER FOR SCREENING MAMMOGRAM FOR MALIGNANT NEOPLASM OF BREAST: ICD-10-CM

## 2024-01-14 PROCEDURE — 77067 SCR MAMMO BI INCL CAD: CPT

## 2024-01-14 PROCEDURE — 72110 X-RAY EXAM L-2 SPINE 4/>VWS: CPT

## 2024-01-14 PROCEDURE — 77063 BREAST TOMOSYNTHESIS BI: CPT

## 2024-01-16 NOTE — RESULT ENCOUNTER NOTE
Tests were not normal, and should follow at  your scheduled Office visit. Please call about this, if Wiztango message is not available or not read by patient.

## 2024-02-21 PROBLEM — Z12.11 COLON CANCER SCREENING: Status: RESOLVED | Noted: 2018-09-07 | Resolved: 2024-02-21

## 2024-03-01 DIAGNOSIS — K21.00 GASTROESOPHAGEAL REFLUX DISEASE WITH ESOPHAGITIS WITHOUT HEMORRHAGE: Primary | ICD-10-CM

## 2024-03-01 RX ORDER — OMEPRAZOLE 20 MG/1
20 CAPSULE, DELAYED RELEASE ORAL
Qty: 30 CAPSULE | Refills: 5 | Status: SHIPPED | OUTPATIENT
Start: 2024-03-01 | End: 2024-08-28

## 2024-04-30 DIAGNOSIS — F41.9 ANXIETY: Chronic | ICD-10-CM

## 2024-05-01 RX ORDER — ALPRAZOLAM 0.25 MG/1
0.25 TABLET ORAL
Qty: 30 TABLET | Refills: 0 | Status: SHIPPED | OUTPATIENT
Start: 2024-05-01

## 2024-05-31 ENCOUNTER — HOSPITAL ENCOUNTER (OUTPATIENT)
Dept: RADIOLOGY | Age: 59
Discharge: HOME | End: 2024-05-31
Payer: COMMERCIAL

## 2024-05-31 DIAGNOSIS — Z13.820 SCREENING FOR OSTEOPOROSIS: ICD-10-CM

## 2024-05-31 DIAGNOSIS — Z78.0 MENOPAUSE: ICD-10-CM

## 2024-05-31 PROCEDURE — 77080 DXA BONE DENSITY AXIAL: CPT

## 2024-06-03 ENCOUNTER — TELEPHONE (OUTPATIENT)
Age: 59
End: 2024-06-03

## 2024-06-03 NOTE — TELEPHONE ENCOUNTER
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 Please put document in Mychart for her, asap.

## 2024-06-05 NOTE — TELEPHONE ENCOUNTER
I did speak to the .  She said she did not get her certificate and if she did get it she can't find it.  I did give her the phone number for [place] office to give them a call to see if they can issue a new cert.

## 2024-06-05 NOTE — TELEPHONE ENCOUNTER
Patient return call. 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 She is now going back to flying and is requesting an update on hr FAA clearance forms.    Please review and advice  Thank you

## 2024-07-10 ENCOUNTER — OFFICE VISIT (OUTPATIENT)
Dept: FAMILY MEDICINE CLINIC | Facility: CLINIC | Age: 59
End: 2024-07-10
Payer: COMMERCIAL

## 2024-07-10 VITALS
HEIGHT: 64 IN | BODY MASS INDEX: 26.46 KG/M2 | DIASTOLIC BLOOD PRESSURE: 70 MMHG | SYSTOLIC BLOOD PRESSURE: 130 MMHG | RESPIRATION RATE: 18 BRPM | HEART RATE: 77 BPM | WEIGHT: 155 LBS | OXYGEN SATURATION: 97 %

## 2024-07-10 DIAGNOSIS — E78.2 MIXED HYPERLIPIDEMIA: Primary | ICD-10-CM

## 2024-07-10 PROCEDURE — 99396 PREV VISIT EST AGE 40-64: CPT | Performed by: FAMILY MEDICINE

## 2024-07-10 NOTE — PROGRESS NOTES
Adult Annual Physical  Name: Maria Alejandra Silva      : 1965      MRN: [id number]  Encounter Provider: Rex Murillo MD  Encounter Date: 7/10/2024   Encounter department: [facility]    Assessment & Plan   1. Mixed hyperlipidemia  -     Comprehensive metabolic panel; Future; Expected date: 07/10/2024  Immunizations and preventive care screenings were discussed with patient today. Appropriate education was printed on patient's after visit summary.    Counseling:  Alcohol/drug use: discussed moderation in alcohol intake, the recommendations for healthy alcohol use, and avoidance of illicit drug use.  Dental Health: discussed importance of regular tooth brushing, flossing, and dental visits.  Injury prevention: discussed safety/seat belts, safety helmets, smoke detectors, carbon dioxide detectors, and smoking near bedding or upholstery.  Sexual health: discussed sexually transmitted diseases, partner selection, use of condoms, avoidance of unintended pregnancy, and contraceptive alternatives.  Exercise: the importance of regular exercise/physical activity was discussed. Recommend exercise 3-5 times per week for at least 30 minutes.        Chief Complaint   Patient presents with   • Annual Exam       History of Present Illness     Adult Annual Physical:  Patient presents for annual physical.     Diet and Physical Activity:  - Diet/Nutrition: well balanced diet.  - Exercise: walking.    Depression Screening:  - PHQ-2 Score: 0    General Health:  - Sleep: sleeps well.  - Hearing: normal hearing bilateral ears.  - Vision: goes for regular eye exams.  - Dental: regular dental visits.    /GYN Health:  - Follows with GYN: yes.   - Menopause: postmenopausal.   - History of STDs: no    Advanced Care Planning:  - Has an advanced directive?: no    - Has a durable medical POA?: no    - ACP document given to patient?: no      Review of Systems   Constitutional: Negative.    HENT: Negative.    "  Eyes: Negative.    Respiratory: Negative.     Cardiovascular: Negative.    Gastrointestinal: Negative.    Endocrine: Negative.    Genitourinary: Negative.    Musculoskeletal: Negative.    Skin: Negative.    Allergic/Immunologic: Negative.    Neurological: Negative.    Hematological: Negative.    Psychiatric/Behavioral: Negative.           Objective     /70 (BP Location: Right arm, Patient Position: Sitting, Cuff Size: Large)   Pulse 77   Resp 18   Ht 5' 4\" (1.626 m)   Wt 70.3 kg (155 lb)   SpO2 97%   BMI 26.61 kg/m²     Physical Exam  Vitals and nursing note reviewed.   Constitutional:       General: She is not in acute distress.     Appearance: She is well-developed. She is not diaphoretic.   HENT:      Head: Normocephalic and atraumatic.      Right Ear: Hearing, tympanic membrane, ear canal and external ear normal.      Left Ear: Hearing, tympanic membrane, ear canal and external ear normal.      Nose: Nose normal.      Right Sinus: No maxillary sinus tenderness or frontal sinus tenderness.      Left Sinus: No maxillary sinus tenderness or frontal sinus tenderness.      Mouth/Throat:      Pharynx: Uvula midline. No oropharyngeal exudate.   Eyes:      General: Lids are everted, no foreign bodies appreciated.      Conjunctiva/sclera: Conjunctivae normal.      Pupils: Pupils are equal, round, and reactive to light.      Comments: Ishihara color plates were normal.   Neck:      Thyroid: No thyromegaly.      Vascular: No carotid bruit.      Trachea: No tracheal deviation.   Cardiovascular:      Rate and Rhythm: Normal rate and regular rhythm.      Pulses:           Carotid pulses are 2+ on the right side and 2+ on the left side.     Heart sounds: Normal heart sounds. No murmur heard.     No friction rub. No gallop.   Pulmonary:      Effort: Pulmonary effort is normal. No respiratory distress.      Breath sounds: Normal breath sounds. No wheezing or rales.   Abdominal:      General: Bowel sounds are " normal. There is no distension.      Palpations: Abdomen is soft.      Tenderness: There is no abdominal tenderness.   Musculoskeletal:      Cervical back: Normal range of motion and neck supple.   Lymphadenopathy:      Cervical: No cervical adenopathy.   Skin:     General: Skin is warm and dry.   Neurological:      Mental Status: She is alert and oriented to person, place, and time.      Coordination: Coordination normal.   Psychiatric:         Behavior: Behavior normal.         Thought Content: Thought content normal.         Judgment: Judgment normal.

## 2024-07-23 ENCOUNTER — DOCUMENTATION (OUTPATIENT)
Dept: FAMILY MEDICINE CLINIC | Facility: CLINIC | Age: 59
End: 2024-07-23

## 2024-07-24 ENCOUNTER — TELEPHONE (OUTPATIENT)
Age: 59
End: 2024-07-24

## 2024-07-24 NOTE — TELEPHONE ENCOUNTER
Patient returning call from office, no notes regarding messages left for patient. Patient would like a return call.

## 2024-09-11 ENCOUNTER — APPOINTMENT (OUTPATIENT)
Dept: LAB | Facility: CLINIC | Age: 59
End: 2024-09-11
Payer: COMMERCIAL

## 2024-09-11 DIAGNOSIS — E78.2 MIXED HYPERLIPIDEMIA: ICD-10-CM

## 2024-09-11 DIAGNOSIS — Z00.8 HEALTH EXAMINATION IN POPULATION SURVEY: ICD-10-CM

## 2024-09-11 LAB
ALBUMIN SERPL BCG-MCNC: 4.3 G/DL (ref 3.5–5)
ALP SERPL-CCNC: 72 U/L (ref 34–104)
ALT SERPL W P-5'-P-CCNC: 15 U/L (ref 7–52)
ANION GAP SERPL CALCULATED.3IONS-SCNC: 6 MMOL/L (ref 4–13)
AST SERPL W P-5'-P-CCNC: 19 U/L (ref 13–39)
BILIRUB SERPL-MCNC: 0.49 MG/DL (ref 0.2–1)
BUN SERPL-MCNC: 15 MG/DL (ref 5–25)
CALCIUM SERPL-MCNC: 9.2 MG/DL (ref 8.4–10.2)
CHLORIDE SERPL-SCNC: 107 MMOL/L (ref 96–108)
CHOLEST SERPL-MCNC: 262 MG/DL
CO2 SERPL-SCNC: 26 MMOL/L (ref 21–32)
CREAT SERPL-MCNC: 0.85 MG/DL (ref 0.6–1.3)
EST. AVERAGE GLUCOSE BLD GHB EST-MCNC: 108 MG/DL
GFR SERPL CREATININE-BSD FRML MDRD: 76 ML/MIN/1.73SQ M
GLUCOSE P FAST SERPL-MCNC: 101 MG/DL (ref 65–99)
HBA1C MFR BLD: 5.4 %
HDLC SERPL-MCNC: 63 MG/DL
LDLC SERPL CALC-MCNC: 172 MG/DL (ref 0–100)
NONHDLC SERPL-MCNC: 199 MG/DL
POTASSIUM SERPL-SCNC: 4.2 MMOL/L (ref 3.5–5.3)
PROT SERPL-MCNC: 7.2 G/DL (ref 6.4–8.4)
SODIUM SERPL-SCNC: 139 MMOL/L (ref 135–147)
TRIGL SERPL-MCNC: 133 MG/DL

## 2024-09-11 PROCEDURE — 36415 COLL VENOUS BLD VENIPUNCTURE: CPT

## 2024-09-11 PROCEDURE — 80053 COMPREHEN METABOLIC PANEL: CPT

## 2024-09-11 PROCEDURE — 83036 HEMOGLOBIN GLYCOSYLATED A1C: CPT

## 2024-09-11 PROCEDURE — 80061 LIPID PANEL: CPT

## 2024-09-16 ENCOUNTER — TELEPHONE (OUTPATIENT)
Dept: FAMILY MEDICINE CLINIC | Facility: CLINIC | Age: 59
End: 2024-09-16

## 2024-09-16 NOTE — TELEPHONE ENCOUNTER
Called Pt to discuss labs. No answer, LM advising CB. Upon return please go over PCP message above/below.

## 2024-09-16 NOTE — TELEPHONE ENCOUNTER
----- Message from Rex Murillo MD sent at 9/16/2024 12:47 PM EDT -----  Tests were normal. Please follow up at next office visit as scheduled.

## 2024-10-18 ENCOUNTER — HOSPITAL ENCOUNTER (OUTPATIENT)
Dept: CT IMAGING | Facility: HOSPITAL | Age: 59
Discharge: HOME | End: 2024-10-18
Payer: COMMERCIAL

## 2024-10-18 DIAGNOSIS — M21.70 UNEQUAL LIMB LENGTH (ACQUIRED), UNSPECIFIED SITE: ICD-10-CM

## 2024-10-18 PROCEDURE — 77073 BONE LENGTH STUDIES: CPT

## 2024-11-10 ENCOUNTER — OFFICE VISIT (OUTPATIENT)
Dept: URGENT CARE | Facility: CLINIC | Age: 59
End: 2024-11-10
Payer: COMMERCIAL

## 2024-11-10 ENCOUNTER — APPOINTMENT (OUTPATIENT)
Dept: RADIOLOGY | Facility: CLINIC | Age: 59
End: 2024-11-10
Payer: COMMERCIAL

## 2024-11-10 VITALS
BODY MASS INDEX: 26.46 KG/M2 | SYSTOLIC BLOOD PRESSURE: 145 MMHG | TEMPERATURE: 97.9 F | WEIGHT: 155 LBS | OXYGEN SATURATION: 97 % | DIASTOLIC BLOOD PRESSURE: 73 MMHG | RESPIRATION RATE: 15 BRPM | HEART RATE: 50 BPM | HEIGHT: 64 IN

## 2024-11-10 DIAGNOSIS — R05.1 ACUTE COUGH: ICD-10-CM

## 2024-11-10 DIAGNOSIS — R91.8 LEFT LOWER LOBE PULMONARY INFILTRATE: Primary | ICD-10-CM

## 2024-11-10 PROCEDURE — 71046 X-RAY EXAM CHEST 2 VIEWS: CPT

## 2024-11-10 PROCEDURE — 99214 OFFICE O/P EST MOD 30 MIN: CPT | Performed by: PHYSICIAN ASSISTANT

## 2024-11-10 RX ORDER — AZITHROMYCIN 250 MG/1
TABLET, FILM COATED ORAL
Qty: 6 TABLET | Refills: 0 | Status: SHIPPED | OUTPATIENT
Start: 2024-11-10 | End: 2024-11-14

## 2024-11-10 RX ORDER — ALBUTEROL SULFATE 90 UG/1
2 INHALANT RESPIRATORY (INHALATION) EVERY 6 HOURS PRN
Qty: 8.5 G | Refills: 0 | Status: SHIPPED | OUTPATIENT
Start: 2024-11-10

## 2024-11-10 NOTE — LETTER
November 10, 2024     Patient: Maria Alejandra Silva   YOB: 1965   Date of Visit: 11/10/2024       To Whom It May Concern:    It is my medical opinion that Maria Alejandra Silva may return to work on 11/12/2024 .    If you have any questions or concerns, please don't hesitate to call.         Sincerely,        Traci Lozoya PA-C    CC: No Recipients

## 2024-11-10 NOTE — PROGRESS NOTES
[facility]        NAME: Maria Alejandra Silva is a 59 y.o. female  : 1965    MRN: [id number]  DATE: November 10, 2024  TIME: 10:12 AM    Assessment and Plan   Left lower lobe pulmonary infiltrate [R91.8]  1. Left lower lobe pulmonary infiltrate  XR chest pa and lateral    albuterol (ProAir HFA) 90 mcg/act inhaler    amoxicillin-clavulanate (AUGMENTIN) 875-125 mg per tablet    azithromycin (ZITHROMAX) 250 mg tablet    guaifenesin-codeine (GUAIFENESIN AC) 100-10 MG/5ML liquid      Presents with acute cough.  She notes an associated night sweats cough has been present for over a week recommend chest x-ray for further evaluation.  Chest x-ray demonstrates left lower lobe patchy infiltrate with hilar adenopathy concerning for developing pneumonia.  Patient will be started on Augmentin and azithromycin to treat.  She is also provided with an albuterol inhaler to assist with wheezing and shortness of breath.  She is also provided with guaifenesin codeine to assist with sleep.  Patient is instructed to take the medication only at night and should discard it once symptoms have resolved.      Patient Instructions     Patient Instructions   Take Augmentin and azithromycin as prescribed.  Albuterol inhaler as needed for any chest tightness, shortness of breath, or coughing fit.  Guaifenesin with codeine at night to help assist with sleep.  Recommend continue Mucinex DM during the day.  If symptoms or not improved in 2 to 3 days follow-up with PCP.  If symptoms worsen or new symptoms develop report to the emergency room immediately.    Follow up with PCP in 3-5 days.  Proceed to  ER if symptoms worsen.    If tests have been performed at [facility], our office will contact you with results if changes need to be made to the care plan discussed with you at the visit. You can review your full results on [facility].     Chief Complaint     Chief Complaint   Patient presents with    Influenza     Pt reports that she has  been sick for a week and things progressively got worse. She reports that she now has this cough that will not allow hher to sleep- she took meds that were given to her daughter and that did not help (bromfed) she also reports wheezing and gurgling.          History of Present Illness       59-year-old female presents with complaint of URI symptoms for 1 week duration.  She reports that sinus congestion has mostly resolved.  She started with a headache last week.  Patient reports that she was spiking fevers throughout the week with a Tmax of 102 on Tuesday.  Patient states that her cough is nonproductive however has been keeping her up at night especially over the last 2 nights.  She additionally notes wheezing and gurgling in her chest.  She notes night sweats for the last 3 nights.        Review of Systems   Review of Systems   Constitutional:  Negative for fever.   HENT:  Negative for congestion, postnasal drip and rhinorrhea.    Respiratory:  Positive for cough, chest tightness, shortness of breath and wheezing.    Cardiovascular:  Negative for chest pain.         Current Medications       Current Outpatient Medications:     albuterol (ProAir HFA) 90 mcg/act inhaler, Inhale 2 puffs every 6 (six) hours as needed for wheezing, Disp: 8.5 g, Rfl: 0    amoxicillin-clavulanate (AUGMENTIN) 875-125 mg per tablet, Take 1 tablet by mouth every 12 (twelve) hours for 5 days, Disp: 10 tablet, Rfl: 0    azithromycin (ZITHROMAX) 250 mg tablet, Take 2 tablets today then 1 tablet daily x 4 days, Disp: 6 tablet, Rfl: 0    guaifenesin-codeine (GUAIFENESIN AC) 100-10 MG/5ML liquid, Take 5 mL by mouth 3 (three) times a day as needed for cough, Disp: 118 mL, Rfl: 0    ALPRAZolam (XANAX) 0.25 mg tablet, Take 1 tablet (0.25 mg total) by mouth daily at bedtime as needed for anxiety, Disp: 30 tablet, Rfl: 0    ascorbic acid (VITAMIN C) 500 mg tablet, Take 500 mg by mouth daily , Disp: , Rfl:     Calcium-Vitamin D (CALTRATE 600 PLUS-VIT D  PO), Take 600 mg by mouth daily , Disp: , Rfl:     clindamycin (CLEOCIN T) 1 % external solution, apply topically to affected area AS NEEDED FOR FLARES OF THE SCALP, Disp: , Rfl: 0    estradiol (VAGIFEM, YUVAFEM) 10 MCG TABS vaginal tablet, Insert 1 tablet (10 mcg total) into the vagina 2 (two) times a week for 24 doses, Disp: 24 tablet, Rfl: 0    methocarbamol (Robaxin-750) 750 mg tablet, Take 1 tablet (750 mg total) by mouth every 8 (eight) hours as needed for muscle spasms, Disp: 90 tablet, Rfl: 0    omeprazole (PriLOSEC) 20 mg delayed release capsule, Take 1 capsule (20 mg total) by mouth daily before breakfast, Disp: 30 capsule, Rfl: 5    orphenadrine (NORFLEX) 100 mg tablet, Take 1 tablet (100 mg total) by mouth daily as needed for muscle spasms, Disp: 30 tablet, Rfl: 5    TURMERIC PO, Take 1 capsule by mouth daily Omega and tumeric, Disp: , Rfl:     zinc gluconate 50 mg tablet, Take 50 mg by mouth daily., Disp: , Rfl:     Current Allergies     Allergies as of 11/10/2024 - Reviewed 11/10/2024   Allergen Reaction Noted    Iodinated contrast media Swelling and Eye Swelling 08/29/2012    Morphine Vomiting 04/21/2012    Morphine and codeine  02/16/2016    Vicodin [hydrocodone-acetaminophen]  02/16/2016    Effexor [venlafaxine] Arthralgia 06/24/2020    Gentamicin Rash 04/21/2012            The following portions of the patient's history were reviewed and updated as appropriate: allergies, current medications, past family history, past medical history, past social history, past surgical history and problem list.     Past Medical History:   Diagnosis Date    Acid reflux     Allergic reaction to dye     Resolved - [id number]    Aneurysm of thoracic aorta (HCC)     last assess 2017    Anxiety     Arthritis     Atrophic vaginitis     Benign familial tremor     Last assessed - 23Dec2013    Cervical back pain with evidence of disc disease     Chronic otitis media of right ear     Last assessed - 29Jan2015    Contact  dermatitis     Last assessed - 26May2015    Dermatitis     Disturbance of smell     Last assessed - 29Apr2013    DJD (degenerative joint disease)     Endometriosis     Last assessed - 11May2016    Esophageal reflux     Last assessed - 23Dec2013    Extremity pain     RUE    Folliculitis     Last assessed - 24Apr2013    Headache     Herpes simplex     High cholesterol     Hyperlipidemia     Last assessed - 11May2016    Lateral epicondylitis of left elbow     Last assessed - 03Mar2014    Lumbar back pain     Myofascial pain syndrome     Neoplasm of skin     Last assessed - [id number]    Osteoarthritis     PONV (postoperative nausea and vomiting)     severe    Postmenopausal atrophic vaginitis     Last assessed - 15Jan2013    Postmenopausal bleeding 5/16/2017    Prophylactic antibiotic     Last assessed - 11May2016    Rotator cuff disorder     Sleep apnea     mild no cpap    Taste impairment     taste disturbances - Last assessed - 29Apr2013    Tremor, hereditary, benign     Wound, open, finger     Last assessed - 02Dec2015       Past Surgical History:   Procedure Laterality Date    COLONOSCOPY      DIAGNOSTIC LAPAROSCOPY      LAPAROSCOPIC ENDOMETRIOSIS FULGURATION      Laparoscop excis endometriotic tissue uterosacral ligaments    PA ARTHRP ACETBLR/PROX FEM PROSTC AGRFT/ALGRFT Left 5/16/2016    Procedure: ANTERIOR TOTAL HIP ARTHROPLASTY ;  Surgeon: Adelso Ochoa MD;  Location:  MAIN OR;  Service: Orthopedics    PA COLONOSCOPY FLX DX W/COLLJ SPEC WHEN PFRMD N/A 12/6/2018    Procedure: COLONOSCOPY;  Surgeon: Magali Motta DO;  Location: [facility];  Service: Gastroenterology    PA ESOPHAGOGASTRODUODENOSCOPY TRANSORAL DIAGNOSTIC N/A 12/6/2018    Procedure: ESOPHAGOGASTRODUODENOSCOPY (EGD);  Surgeon: Magali Motta DO;  Location: [facility];  Service: Gastroenterology    REVISION TOTAL HIP ARTHROPLASTY  06/15/2011    TOTAL HIP ARTHROPLASTY Right     TOTAL HIP ARTHROPLASTY  10/20/2010    UPPER  "GASTROINTESTINAL ENDOSCOPY         Family History   Problem Relation Age of Onset    Hypertension Mother     Stroke Mother     Atrial fibrillation Mother     Diverticulitis Mother         of colon    Esophageal cancer Father 81    Arthritis Father     Stroke Father         Stroke syndrome    No Known Problems Sister     No Known Problems Sister     Arthritis Brother     Other Brother         Esophageal reflux    No Known Problems Brother     Other Brother         HPV    No Known Problems Maternal Grandmother     No Known Problems Maternal Grandfather     Cancer Paternal Grandmother     Colon cancer Paternal Grandmother 78    No Known Problems Paternal Grandfather     No Known Problems Maternal Aunt     Breast cancer Maternal Aunt 50    Breast cancer Maternal Uncle 65    Prostate cancer Paternal Uncle 80    Breast cancer Other         age unknown    Breast cancer Other         unknown age         Medications have been verified.        Objective   /73   Pulse (!) 50   Temp 97.9 °F (36.6 °C)   Resp 15   Ht 5' 4\" (1.626 m)   Wt 70.3 kg (155 lb)   SpO2 97%   BMI 26.61 kg/m²   No LMP recorded. Patient is postmenopausal.       Physical Exam     Physical Exam  Constitutional:       General: She is awake. She is not in acute distress.     Appearance: Normal appearance. She is well-developed and well-groomed. She is not ill-appearing, toxic-appearing or diaphoretic.   HENT:      Head: Normocephalic and atraumatic.      Right Ear: Hearing and external ear normal.      Left Ear: Hearing and external ear normal.      Nose: No nasal deformity.      Mouth/Throat:      Lips: Pink. No lesions.   Eyes:      General: Lids are normal. Vision grossly intact. Gaze aligned appropriately.   Cardiovascular:      Rate and Rhythm: Normal rate and regular rhythm.      Heart sounds: Normal heart sounds, S1 normal and S2 normal.   Pulmonary:      Effort: Pulmonary effort is normal.      Breath sounds: Normal breath sounds.      " "Comments: Patient is speaking in full sentences with no increased respiratory effort. No audible wheezing or stridor.   Musculoskeletal:      Cervical back: Normal range of motion.   Skin:     General: Skin is warm and dry.   Neurological:      Mental Status: She is alert and oriented to person, place, and time.      Coordination: Coordination is intact.      Gait: Gait is intact.   Psychiatric:         Attention and Perception: Attention and perception normal.         Mood and Affect: Mood and affect normal.         Speech: Speech normal.         Behavior: Behavior normal. Behavior is cooperative.               Note: Portions of this record may have been created with voice recognition software. Occasional wrong word or \"sound a like\" substitutions may have occurred due to the inherent limitations of voice recognition software. Please read the chart carefully and recognize, using context, where substitutions have occurred.*      "

## 2024-11-10 NOTE — PATIENT INSTRUCTIONS
Take Augmentin and azithromycin as prescribed.  Albuterol inhaler as needed for any chest tightness, shortness of breath, or coughing fit.  Guaifenesin with codeine at night to help assist with sleep.  Recommend continue Mucinex DM during the day.  If symptoms or not improved in 2 to 3 days follow-up with PCP.  If symptoms worsen or new symptoms develop report to the emergency room immediately.

## 2024-12-21 ENCOUNTER — VBI (OUTPATIENT)
Dept: ADMINISTRATIVE | Facility: OTHER | Age: 59
End: 2024-12-21

## 2024-12-21 NOTE — TELEPHONE ENCOUNTER
12/21/24 3:36 PM     Chart reviewed for Pap Smear (HPV) aka Cervical Cancer Screening was/were submitted to the patient's insurance.     Jarred Birch MA   [facility]

## 2025-01-02 ENCOUNTER — TRANSCRIBE ORDERS (OUTPATIENT)
Dept: ADMINISTRATIVE | Facility: HOSPITAL | Age: 60
End: 2025-01-02

## 2025-01-02 DIAGNOSIS — M19.012 ARTHRITIS OF LEFT ACROMIOCLAVICULAR JOINT: ICD-10-CM

## 2025-01-02 DIAGNOSIS — Z12.31 ENCOUNTER FOR SCREENING MAMMOGRAM FOR MALIGNANT NEOPLASM OF BREAST: Primary | ICD-10-CM

## 2025-01-08 RX ORDER — TRAMADOL HYDROCHLORIDE 50 MG/1
50 TABLET ORAL EVERY 8 HOURS PRN
Qty: 20 TABLET | Refills: 0 | Status: SHIPPED | OUTPATIENT
Start: 2025-01-08

## 2025-01-30 ENCOUNTER — ANNUAL EXAM (OUTPATIENT)
Dept: GYNECOLOGY | Facility: CLINIC | Age: 60
End: 2025-01-30
Payer: COMMERCIAL

## 2025-01-30 VITALS
WEIGHT: 146 LBS | HEART RATE: 50 BPM | HEIGHT: 64 IN | SYSTOLIC BLOOD PRESSURE: 112 MMHG | BODY MASS INDEX: 24.92 KG/M2 | DIASTOLIC BLOOD PRESSURE: 70 MMHG

## 2025-01-30 DIAGNOSIS — Z01.419 ENCOUNTER FOR GYNECOLOGICAL EXAMINATION WITHOUT ABNORMAL FINDING: Primary | ICD-10-CM

## 2025-01-30 DIAGNOSIS — N95.2 ATROPHIC VAGINITIS: ICD-10-CM

## 2025-01-30 DIAGNOSIS — Z12.11 SCREENING FOR COLON CANCER: ICD-10-CM

## 2025-01-30 PROCEDURE — S0612 ANNUAL GYNECOLOGICAL EXAMINA: HCPCS | Performed by: OBSTETRICS & GYNECOLOGY

## 2025-01-30 PROCEDURE — G0145 SCR C/V CYTO,THINLAYER,RESCR: HCPCS | Performed by: OBSTETRICS & GYNECOLOGY

## 2025-01-30 RX ORDER — ESTRADIOL 10 UG/1
1 INSERT VAGINAL 2 TIMES WEEKLY
Qty: 24 TABLET | Refills: 3 | Status: SHIPPED | OUTPATIENT
Start: 2025-01-30 | End: 2025-04-22

## 2025-01-30 NOTE — PROGRESS NOTES
"Name: Maria Alejandra Silva      : 1965      MRN: [id number]  Encounter Provider: Jamal Winkler DO  Encounter Date: 2025   Encounter department: [facility]  :  Assessment & Plan  Encounter for gynecological examination without abnormal finding    Orders:    Liquid-based pap, screening    Atrophic vaginitis    Orders:    estradiol (VAGIFEM, YUVAFEM) 10 MCG TABS vaginal tablet; Insert 1 tablet (10 mcg total) into the vagina 2 (two) times a week for 24 doses    Screening for colon cancer  Referral placed for colonoscopy           History of Present Illness   HPI  Maria Alejandra Silva is a 59 y.o. female who presents emanation.  Offers no complaints.  Denies any vaginal irritation, burning, discharge or bleeding.  She has been using Vagifem tablets twice weekly.  Denies any dysuria, hematuria urgency or urinary incontinence.  No GI complaints.    Colonoscopy 2015.  Normal.  Repeat in 5 years secondary to family history.    DEXA scan 2024.  Osteopenia      Review of Systems   Constitutional: Negative.    HENT:  Negative for sore throat and trouble swallowing.    Gastrointestinal: Negative.    Genitourinary: Negative.           Objective   /70 (BP Location: Left arm, Patient Position: Sitting, Cuff Size: Large)   Pulse (!) 50   Ht 5' 4\" (1.626 m)   Wt 66.2 kg (146 lb)   BMI 25.06 kg/m²      Physical Exam  Vitals reviewed.   Constitutional:       Appearance: Normal appearance.   Cardiovascular:      Rate and Rhythm: Normal rate and regular rhythm.      Pulses: Normal pulses.      Heart sounds: Normal heart sounds.   Pulmonary:      Effort: Pulmonary effort is normal.      Breath sounds: Normal breath sounds.   Chest:   Breasts:     Right: No swelling, bleeding, inverted nipple, mass, nipple discharge, skin change or tenderness.      Left: No swelling, bleeding, inverted nipple, mass, nipple discharge, skin change or tenderness.   Abdominal:      General: " There is no distension.      Palpations: Abdomen is soft. There is no mass.      Tenderness: There is no abdominal tenderness. There is no guarding or rebound.      Hernia: No hernia is present. There is no hernia in the left inguinal area or right inguinal area.   Genitourinary:     General: Normal vulva.      Labia:         Right: No rash, tenderness or lesion.         Left: No rash, tenderness or lesion.       Vagina: Normal.      Cervix: Normal.      Uterus: Normal.       Adnexa:         Right: No mass, tenderness or fullness.          Left: No mass, tenderness or fullness.     Musculoskeletal:      Cervical back: Normal range of motion and neck supple. No tenderness.   Lymphadenopathy:      Cervical: No cervical adenopathy.      Upper Body:      Right upper body: No supraclavicular, axillary or pectoral adenopathy.      Left upper body: No supraclavicular, axillary or pectoral adenopathy.      Lower Body: No right inguinal adenopathy. No left inguinal adenopathy.   Neurological:      Mental Status: She is alert.

## 2025-02-04 LAB
LAB AP GYN PRIMARY INTERPRETATION: NORMAL
Lab: NORMAL

## 2025-02-14 ENCOUNTER — HOSPITAL ENCOUNTER (OUTPATIENT)
Dept: RADIOLOGY | Age: 60
Discharge: HOME | End: 2025-02-14
Payer: COMMERCIAL

## 2025-02-14 VITALS — BODY MASS INDEX: 24.92 KG/M2 | HEIGHT: 64 IN | WEIGHT: 146 LBS

## 2025-02-14 DIAGNOSIS — Z12.31 ENCOUNTER FOR SCREENING MAMMOGRAM FOR MALIGNANT NEOPLASM OF BREAST: ICD-10-CM

## 2025-02-14 PROCEDURE — 77063 BREAST TOMOSYNTHESIS BI: CPT

## 2025-02-14 PROCEDURE — 77067 SCR MAMMO BI INCL CAD: CPT

## 2025-04-16 DIAGNOSIS — B02.9 HERPES ZOSTER WITHOUT COMPLICATION: Primary | ICD-10-CM

## 2025-04-16 RX ORDER — VALACYCLOVIR HYDROCHLORIDE 500 MG/1
500 TABLET, FILM COATED ORAL 3 TIMES DAILY
Qty: 21 TABLET | Refills: 0 | Status: SHIPPED | OUTPATIENT
Start: 2025-04-16 | End: 2025-04-23

## 2025-05-14 ENCOUNTER — OFFICE VISIT (OUTPATIENT)
Age: 60
End: 2025-05-14

## 2025-05-14 VITALS
SYSTOLIC BLOOD PRESSURE: 130 MMHG | DIASTOLIC BLOOD PRESSURE: 80 MMHG | WEIGHT: 151 LBS | HEART RATE: 48 BPM | BODY MASS INDEX: 25.78 KG/M2 | HEIGHT: 64 IN

## 2025-05-14 DIAGNOSIS — Z02.89 ENCOUNTER FOR FEDERAL AVIATION ADMINISTRATION (FAA) EXAMINATION: Primary | ICD-10-CM

## 2025-05-14 PROCEDURE — 99499FA: Performed by: FAMILY MEDICINE

## 2025-05-14 NOTE — PROGRESS NOTES
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

## 2025-05-31 DIAGNOSIS — E78.2 MIXED HYPERLIPIDEMIA: Primary | ICD-10-CM

## 2025-05-31 DIAGNOSIS — M15.0 PRIMARY OSTEOARTHRITIS INVOLVING MULTIPLE JOINTS: ICD-10-CM

## 2025-05-31 DIAGNOSIS — F41.9 ANXIETY: Chronic | ICD-10-CM

## 2025-06-02 RX ORDER — ORPHENADRINE CITRATE 100 MG/1
100 TABLET ORAL DAILY PRN
Qty: 30 TABLET | Refills: 0 | Status: SHIPPED | OUTPATIENT
Start: 2025-06-02

## 2025-06-02 RX ORDER — ALPRAZOLAM 0.25 MG
0.25 TABLET ORAL
Qty: 30 TABLET | Refills: 0 | Status: SHIPPED | OUTPATIENT
Start: 2025-06-02

## 2025-06-24 ENCOUNTER — VBI (OUTPATIENT)
Dept: ADMINISTRATIVE | Facility: OTHER | Age: 60
End: 2025-06-24

## 2025-06-24 DIAGNOSIS — R53.82 CHRONIC FATIGUE: ICD-10-CM

## 2025-06-24 DIAGNOSIS — F41.9 ANXIETY: Primary | ICD-10-CM

## 2025-06-24 DIAGNOSIS — Z78.0 MENOPAUSE: ICD-10-CM

## 2025-06-24 DIAGNOSIS — G25.0 BENIGN FAMILIAL TREMOR: ICD-10-CM

## 2025-06-24 NOTE — TELEPHONE ENCOUNTER
06/24/25 12:45 PM     Chart reviewed for CRC: Colonoscopy was/were submitted to the patient's insurance.     Lula Cooley MA   [facility]

## 2025-08-06 DIAGNOSIS — N95.2 ATROPHIC VAGINITIS: ICD-10-CM

## 2025-08-07 RX ORDER — ESTRADIOL 10 UG/1
1 TABLET, FILM COATED VAGINAL 2 TIMES WEEKLY
Qty: 24 TABLET | Refills: 1 | Status: SHIPPED | OUTPATIENT
Start: 2025-08-07 | End: 2025-10-28

## 2025-08-09 ENCOUNTER — APPOINTMENT (OUTPATIENT)
Dept: LAB | Facility: CLINIC | Age: 60
End: 2025-08-09
Payer: COMMERCIAL

## 2025-08-17 DIAGNOSIS — M54.2 NECK PAIN: ICD-10-CM

## 2025-08-18 DIAGNOSIS — Z00.6 ENCOUNTER FOR EXAMINATION FOR NORMAL COMPARISON OR CONTROL IN CLINICAL RESEARCH PROGRAM: ICD-10-CM

## 2025-08-20 RX ORDER — METHOCARBAMOL 750 MG/1
750 TABLET, FILM COATED ORAL EVERY 8 HOURS PRN
Qty: 90 TABLET | Refills: 0 | Status: SHIPPED | OUTPATIENT
Start: 2025-08-20

## 2025-08-23 ENCOUNTER — OFFICE VISIT (OUTPATIENT)
Dept: URGENT CARE | Facility: CLINIC | Age: 60
End: 2025-08-23
Payer: COMMERCIAL

## 2025-08-23 VITALS
DIASTOLIC BLOOD PRESSURE: 51 MMHG | HEART RATE: 61 BPM | RESPIRATION RATE: 20 BRPM | SYSTOLIC BLOOD PRESSURE: 94 MMHG | OXYGEN SATURATION: 97 %
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
## 2025-08-23 DIAGNOSIS — M54.2 CERVICAL PAIN: ICD-10-CM

## 2025-08-23 DIAGNOSIS — R29.898 ARM WEAKNESS: ICD-10-CM

## 2025-08-23 DIAGNOSIS — R55 VASOVAGAL EPISODE: ICD-10-CM

## 2025-08-23 DIAGNOSIS — S49.91XA INJURY OF RIGHT UPPER EXTREMITY, INITIAL ENCOUNTER: ICD-10-CM

## 2025-08-23 LAB
ATRIAL RATE: 51 BPM
GLUCOSE SERPL-MCNC: 120 MG/DL (ref 65–140)
P AXIS: 66 DEGREES
PR INTERVAL: 148 MS
QRS AXIS: 66 DEGREES
QRSD INTERVAL: 90 MS
QT INTERVAL: 490 MS
QTC INTERVAL: 452 MS
T WAVE AXIS: 59 DEGREES
VENTRICULAR RATE: 51 BPM

## 2025-08-23 PROCEDURE — 93005 ELECTROCARDIOGRAM TRACING: CPT | Performed by: NURSE PRACTITIONER

## 2025-08-23 PROCEDURE — 99214 OFFICE O/P EST MOD 30 MIN: CPT | Performed by: NURSE PRACTITIONER

## 2025-08-23 PROCEDURE — 82948 REAGENT STRIP/BLOOD GLUCOSE: CPT

## 2025-08-25 PROBLEM — S62.308A CLOSED FRACTURE OF 5TH METACARPAL: Status: ACTIVE | Noted: 2025-08-25
